# Patient Record
Sex: MALE | Race: WHITE | NOT HISPANIC OR LATINO | Employment: UNEMPLOYED | URBAN - NONMETROPOLITAN AREA
[De-identification: names, ages, dates, MRNs, and addresses within clinical notes are randomized per-mention and may not be internally consistent; named-entity substitution may affect disease eponyms.]

---

## 2017-02-28 ENCOUNTER — TRANSCRIBE ORDERS (OUTPATIENT)
Dept: ADMINISTRATIVE | Facility: HOSPITAL | Age: 32
End: 2017-02-28

## 2017-02-28 DIAGNOSIS — R10.9 ACUTE ABDOMINAL PAIN: Primary | ICD-10-CM

## 2017-03-01 ENCOUNTER — HOSPITAL ENCOUNTER (OUTPATIENT)
Dept: GENERAL RADIOLOGY | Facility: HOSPITAL | Age: 32
Discharge: HOME | End: 2017-03-01

## 2017-03-01 ENCOUNTER — TRANSCRIBE ORDERS (OUTPATIENT)
Dept: ADMINISTRATIVE | Facility: HOSPITAL | Age: 32
End: 2017-03-01

## 2017-03-01 ENCOUNTER — HOSPITAL ENCOUNTER (OUTPATIENT)
Dept: CT IMAGING | Facility: HOSPITAL | Age: 32
Discharge: HOME | End: 2017-03-01
Admitting: NURSE PRACTITIONER

## 2017-03-01 DIAGNOSIS — M25.511 BILATERAL SHOULDER PAIN, UNSPECIFIED CHRONICITY: Primary | ICD-10-CM

## 2017-03-01 DIAGNOSIS — M25.512 BILATERAL SHOULDER PAIN, UNSPECIFIED CHRONICITY: ICD-10-CM

## 2017-03-01 DIAGNOSIS — R10.9 ACUTE ABDOMINAL PAIN: ICD-10-CM

## 2017-03-01 DIAGNOSIS — M25.511 BILATERAL SHOULDER PAIN, UNSPECIFIED CHRONICITY: ICD-10-CM

## 2017-03-01 DIAGNOSIS — M25.512 BILATERAL SHOULDER PAIN, UNSPECIFIED CHRONICITY: Primary | ICD-10-CM

## 2017-03-01 PROCEDURE — 73030 X-RAY EXAM OF SHOULDER: CPT

## 2017-03-01 PROCEDURE — 74176 CT ABD & PELVIS W/O CONTRAST: CPT | Performed by: RADIOLOGY

## 2017-03-01 PROCEDURE — 74176 CT ABD & PELVIS W/O CONTRAST: CPT

## 2017-03-01 PROCEDURE — 73030 X-RAY EXAM OF SHOULDER: CPT | Performed by: RADIOLOGY

## 2017-03-02 ENCOUNTER — OFFICE VISIT (OUTPATIENT)
Dept: UROLOGY | Facility: CLINIC | Age: 32
End: 2017-03-02

## 2017-03-02 VITALS
WEIGHT: 225 LBS | SYSTOLIC BLOOD PRESSURE: 138 MMHG | DIASTOLIC BLOOD PRESSURE: 85 MMHG | BODY MASS INDEX: 31.5 KG/M2 | HEIGHT: 71 IN | HEART RATE: 67 BPM

## 2017-03-02 DIAGNOSIS — R10.9 LEFT FLANK PAIN: ICD-10-CM

## 2017-03-02 DIAGNOSIS — N20.0 KIDNEY STONES: Primary | ICD-10-CM

## 2017-03-02 DIAGNOSIS — R31.29 MICROHEMATURIA: ICD-10-CM

## 2017-03-02 LAB
BILIRUB BLD-MCNC: NEGATIVE MG/DL
CLARITY, POC: CLEAR
COLOR UR: YELLOW
GLUCOSE UR STRIP-MCNC: NEGATIVE MG/DL
KETONES UR QL: NEGATIVE
LEUKOCYTE EST, POC: NEGATIVE
NITRITE UR-MCNC: NEGATIVE MG/ML
PH UR: 6 [PH] (ref 5–8)
PROT UR STRIP-MCNC: NEGATIVE MG/DL
RBC # UR STRIP: ABNORMAL /UL
SP GR UR: 1.03 (ref 1–1.03)
UROBILINOGEN UR QL: NORMAL

## 2017-03-02 PROCEDURE — 81003 URINALYSIS AUTO W/O SCOPE: CPT | Performed by: NURSE PRACTITIONER

## 2017-03-02 PROCEDURE — 99203 OFFICE O/P NEW LOW 30 MIN: CPT | Performed by: NURSE PRACTITIONER

## 2017-03-02 RX ORDER — OXYCODONE HYDROCHLORIDE AND ACETAMINOPHEN 5; 325 MG/1; MG/1
1-2 TABLET ORAL EVERY 6 HOURS PRN
Qty: 40 TABLET | Refills: 0 | Status: ON HOLD
Start: 2017-03-02 | End: 2017-03-08

## 2017-03-02 RX ORDER — TAMSULOSIN HYDROCHLORIDE 0.4 MG/1
1 CAPSULE ORAL DAILY
Qty: 30 CAPSULE | Refills: 0 | Status: SHIPPED | OUTPATIENT
Start: 2017-03-02 | End: 2017-11-22

## 2017-03-02 RX ORDER — ONDANSETRON 4 MG/1
4 TABLET, FILM COATED ORAL EVERY 8 HOURS PRN
Qty: 30 TABLET | Refills: 0 | Status: SHIPPED | OUTPATIENT
Start: 2017-03-02 | End: 2018-03-02

## 2017-03-02 RX ORDER — IBUPROFEN 800 MG/1
800 TABLET ORAL EVERY 6 HOURS PRN
Status: ON HOLD | COMMUNITY
End: 2017-03-08

## 2017-03-02 NOTE — PROGRESS NOTES
Chief Complaint:          Chief Complaint   Patient presents with   • Nephrolithiasis       HPI:   32 y.o. male presenting for complaint of left flank pain secondary to a 5.5 mm proximal left ureteral stone with hydronephrosis diagnosed per CT scan at [facility] ED on 3/2/2017.  He denies any nausea, vomiting, fever or chills.  UA today shows trace amount of blood.    HPI        Past Medical History:        Past Medical History   Diagnosis Date   • Hypertension          Current Meds:     Current Outpatient Prescriptions   Medication Sig Dispense Refill   • ibuprofen (ADVIL,MOTRIN) 800 MG tablet Take 800 mg by mouth Every 6 (Six) Hours As Needed for mild pain (1-3).     • ondansetron (ZOFRAN) 4 MG tablet Take 1 tablet by mouth Every 8 (Eight) Hours As Needed for nausea or vomiting. 30 tablet 0   • tamsulosin (FLOMAX) 0.4 MG capsule 24 hr capsule Take 1 capsule by mouth Daily. 30 capsule 0     No current facility-administered medications for this visit.         Allergies:      No Known Allergies     Past Surgical History:     Past Surgical History   Procedure Laterality Date   • Tonsillectomy           Social History:     Social History     Social History   • Marital status: Single     Spouse name: N/A   • Number of children: N/A   • Years of education: N/A     Occupational History   • Not on file.     Social History Main Topics   • Smoking status: Former Smoker   • Smokeless tobacco: Former User   • Alcohol use No   • Drug use: No   • Sexual activity: Not on file     Other Topics Concern   • Not on file     Social History Narrative   • No narrative on file       Family History:     Family History   Problem Relation Age of Onset   • Hypertension Father        Review of Systems:     Review of Systems   Constitutional: Positive for fatigue. Negative for chills and fever.   Respiratory: Negative for cough, shortness of breath and wheezing.    Cardiovascular: Negative for leg swelling.   Gastrointestinal: Positive for  abdominal pain, nausea and vomiting.   Musculoskeletal: Positive for back pain. Negative for joint swelling.   Neurological: Negative for dizziness and headaches.   Psychiatric/Behavioral: Negative for confusion.       Physical Exam:     Physical Exam   Constitutional: He is oriented to person, place, and time. He appears well-developed and well-nourished. No distress.   HENT:   Head: Normocephalic and atraumatic.   Eyes: Pupils are equal, round, and reactive to light.   Neck: Normal range of motion. Neck supple. No thyromegaly present.   Cardiovascular: Normal rate, regular rhythm, normal heart sounds and intact distal pulses.    No murmur heard.  Pulmonary/Chest: Effort normal and breath sounds normal. No respiratory distress. He has no wheezes. He has no rales. He exhibits no tenderness.   Abdominal: Soft. Bowel sounds are normal. He exhibits no distension and no mass. There is no tenderness. No hernia.   Genitourinary: Prostate normal.   Musculoskeletal: Normal range of motion. He exhibits no edema or tenderness.   Lymphadenopathy:     He has no cervical adenopathy.   Neurological: He is alert and oriented to person, place, and time. No cranial nerve deficit. He exhibits normal muscle tone. Coordination normal.   Skin: Skin is warm and dry. He is not diaphoretic. No erythema. No pallor.   Psychiatric: He has a normal mood and affect. His behavior is normal. Judgment and thought content normal.   Nursing note and vitals reviewed.      Procedure:     No notes on file      Assessment:     Encounter Diagnoses   Name Primary?   • Kidney stones Yes   • Microhematuria    • Left flank pain      Orders Placed This Encounter   Procedures   • XR Abdomen KUB     Standing Status:   Future     Standing Expiration Date:   3/2/2018     Order Specific Question:   Reason for Exam:     Answer:   LEFT URETERAL STONE   • CBC (No Diff)     Standing Status:   Future     Standing Expiration Date:   3/2/2018   • Basic Metabolic Panel      Standing Status:   Future     Standing Expiration Date:   3/2/2018   • Obtain Informed Consent     Order Specific Question:   Informed consent given for:     Answer:   CYSTOSCOPY LEFT URETEROSCOPY HOLMIUM LASER LITHOTRIPSY POSSIBLE STENT PLACEMENT   • POC Urinalysis Dipstick, Automated       Plan:   Reviewed the CT scan completed on 3/1/2017 with the patient revealing bilateral renal stones in a non-obstructing position along with the 5.5 mm left proximal renal stone with hydronephrosis.  Discussed treatment options.  Informed the patient he has a 40% chance of passing the 5.5 mm stone using conservative methods or he may be scheduled for surgery.  He chooses surgery.  Recommend he have a cystoscopy left ureteroscopy holmium laser lithotripsy with possible stent placement for which he is agreeable.  Dr. Serrano is agreeable to the recommendation.    Counseling was given to patient for the following topics diagnostic results, patient and family education, impressions and risks and benefits of treatment options. and the interim medical history and current results were reviewed.  A treatment plan with follow-up was made. Total time of the encounter was 33 minutes and 33 minutes were spent discussing Kidney stones [N20.0] face-to-face.       This document has been electronically signed by DOLORES Allen March 2, 2017 2:15 PM

## 2017-03-03 ENCOUNTER — RESULTS ENCOUNTER (OUTPATIENT)
Dept: UROLOGY | Facility: CLINIC | Age: 32
End: 2017-03-03

## 2017-03-03 DIAGNOSIS — N20.0 KIDNEY STONES: ICD-10-CM

## 2017-03-06 ENCOUNTER — ANESTHESIA EVENT (OUTPATIENT)
Dept: PERIOP | Facility: HOSPITAL | Age: 32
End: 2017-03-06

## 2017-03-06 ENCOUNTER — APPOINTMENT (OUTPATIENT)
Dept: PREADMISSION TESTING | Facility: HOSPITAL | Age: 32
End: 2017-03-06

## 2017-03-06 ENCOUNTER — ANESTHESIA (OUTPATIENT)
Dept: PERIOP | Facility: HOSPITAL | Age: 32
End: 2017-03-06

## 2017-03-06 ENCOUNTER — HOSPITAL ENCOUNTER (OUTPATIENT)
Facility: HOSPITAL | Age: 32
Setting detail: HOSPITAL OUTPATIENT SURGERY
Discharge: HOME | End: 2017-03-06
Attending: UROLOGY | Admitting: UROLOGY

## 2017-03-06 ENCOUNTER — HOSPITAL ENCOUNTER (OUTPATIENT)
Dept: GENERAL RADIOLOGY | Facility: HOSPITAL | Age: 32
Setting detail: HOSPITAL OUTPATIENT SURGERY
Discharge: HOME | End: 2017-03-06

## 2017-03-06 ENCOUNTER — APPOINTMENT (OUTPATIENT)
Dept: GENERAL RADIOLOGY | Facility: HOSPITAL | Age: 32
End: 2017-03-06

## 2017-03-06 VITALS
OXYGEN SATURATION: 98 % | SYSTOLIC BLOOD PRESSURE: 138 MMHG | BODY MASS INDEX: 30.48 KG/M2 | TEMPERATURE: 97.9 F | DIASTOLIC BLOOD PRESSURE: 89 MMHG | HEIGHT: 72 IN | WEIGHT: 225 LBS | RESPIRATION RATE: 18 BRPM | HEART RATE: 76 BPM

## 2017-03-06 DIAGNOSIS — N20.0 KIDNEY STONES: ICD-10-CM

## 2017-03-06 PROCEDURE — 25010000002 ONDANSETRON PER 1 MG: Performed by: NURSE ANESTHETIST, CERTIFIED REGISTERED

## 2017-03-06 PROCEDURE — 74000 FL SURGERY FLUORO: CPT | Performed by: RADIOLOGY

## 2017-03-06 PROCEDURE — 74000 XR ABDOMEN KUB: CPT | Performed by: RADIOLOGY

## 2017-03-06 PROCEDURE — 25010000002 MIDAZOLAM PER 1 MG: Performed by: NURSE ANESTHETIST, CERTIFIED REGISTERED

## 2017-03-06 PROCEDURE — 52353 CYSTOURETERO W/LITHOTRIPSY: CPT | Performed by: UROLOGY

## 2017-03-06 PROCEDURE — 25010000002 PROPOFOL 10 MG/ML EMULSION: Performed by: NURSE ANESTHETIST, CERTIFIED REGISTERED

## 2017-03-06 PROCEDURE — 25010000002 MEPERIDINE PER 100 MG: Performed by: NURSE ANESTHETIST, CERTIFIED REGISTERED

## 2017-03-06 PROCEDURE — 25010000003 CEFAZOLIN PER 500 MG: Performed by: NURSE PRACTITIONER

## 2017-03-06 PROCEDURE — 76000 FLUOROSCOPY <1 HR PHYS/QHP: CPT

## 2017-03-06 PROCEDURE — 25010000002 FENTANYL CITRATE (PF) 100 MCG/2ML SOLUTION: Performed by: NURSE ANESTHETIST, CERTIFIED REGISTERED

## 2017-03-06 PROCEDURE — 74000 HC ABDOMEN KUB: CPT

## 2017-03-06 PROCEDURE — C1769 GUIDE WIRE: HCPCS | Performed by: UROLOGY

## 2017-03-06 PROCEDURE — 82360 CALCULUS ASSAY QUANT: CPT | Performed by: UROLOGY

## 2017-03-06 RX ORDER — MEPERIDINE HYDROCHLORIDE 25 MG/ML
12.5 INJECTION INTRAMUSCULAR; INTRAVENOUS; SUBCUTANEOUS
Status: DISCONTINUED | OUTPATIENT
Start: 2017-03-06 | End: 2017-03-06 | Stop reason: HOSPADM

## 2017-03-06 RX ORDER — MAGNESIUM HYDROXIDE 1200 MG/15ML
LIQUID ORAL AS NEEDED
Status: DISCONTINUED | OUTPATIENT
Start: 2017-03-06 | End: 2017-03-06 | Stop reason: HOSPADM

## 2017-03-06 RX ORDER — MIDAZOLAM HYDROCHLORIDE 1 MG/ML
INJECTION INTRAMUSCULAR; INTRAVENOUS AS NEEDED
Status: DISCONTINUED | OUTPATIENT
Start: 2017-03-06 | End: 2017-03-06 | Stop reason: SURG

## 2017-03-06 RX ORDER — OXYCODONE HYDROCHLORIDE AND ACETAMINOPHEN 5; 325 MG/1; MG/1
TABLET ORAL
Qty: 40 TABLET | Refills: 0 | Status: SHIPPED | OUTPATIENT
Start: 2017-03-06 | End: 2017-11-22 | Stop reason: DRUGHIGH

## 2017-03-06 RX ORDER — FENTANYL CITRATE 50 UG/ML
50 INJECTION, SOLUTION INTRAMUSCULAR; INTRAVENOUS
Status: DISCONTINUED | OUTPATIENT
Start: 2017-03-06 | End: 2017-03-06 | Stop reason: HOSPADM

## 2017-03-06 RX ORDER — ONDANSETRON 2 MG/ML
4 INJECTION INTRAMUSCULAR; INTRAVENOUS ONCE AS NEEDED
Status: DISCONTINUED | OUTPATIENT
Start: 2017-03-06 | End: 2017-03-06 | Stop reason: HOSPADM

## 2017-03-06 RX ORDER — CIPROFLOXACIN 250 MG/1
250 TABLET, FILM COATED ORAL 2 TIMES DAILY
Qty: 20 TABLET | Refills: 0 | Status: SHIPPED | OUTPATIENT
Start: 2017-03-06 | End: 2017-03-10 | Stop reason: HOSPADM

## 2017-03-06 RX ORDER — FENTANYL CITRATE 50 UG/ML
INJECTION, SOLUTION INTRAMUSCULAR; INTRAVENOUS AS NEEDED
Status: DISCONTINUED | OUTPATIENT
Start: 2017-03-06 | End: 2017-03-06 | Stop reason: SURG

## 2017-03-06 RX ORDER — FAMOTIDINE 10 MG/ML
INJECTION, SOLUTION INTRAVENOUS AS NEEDED
Status: DISCONTINUED | OUTPATIENT
Start: 2017-03-06 | End: 2017-03-06 | Stop reason: SURG

## 2017-03-06 RX ORDER — SODIUM CHLORIDE, SODIUM LACTATE, POTASSIUM CHLORIDE, CALCIUM CHLORIDE 600; 310; 30; 20 MG/100ML; MG/100ML; MG/100ML; MG/100ML
125 INJECTION, SOLUTION INTRAVENOUS CONTINUOUS
Status: DISCONTINUED | OUTPATIENT
Start: 2017-03-06 | End: 2017-03-06 | Stop reason: HOSPADM

## 2017-03-06 RX ORDER — IPRATROPIUM BROMIDE AND ALBUTEROL SULFATE 2.5; .5 MG/3ML; MG/3ML
3 SOLUTION RESPIRATORY (INHALATION) ONCE AS NEEDED
Status: DISCONTINUED | OUTPATIENT
Start: 2017-03-06 | End: 2017-03-06 | Stop reason: HOSPADM

## 2017-03-06 RX ORDER — OXYCODONE HYDROCHLORIDE AND ACETAMINOPHEN 5; 325 MG/1; MG/1
1 TABLET ORAL ONCE AS NEEDED
Status: DISCONTINUED | OUTPATIENT
Start: 2017-03-06 | End: 2017-03-06 | Stop reason: HOSPADM

## 2017-03-06 RX ORDER — ONDANSETRON 2 MG/ML
INJECTION INTRAMUSCULAR; INTRAVENOUS AS NEEDED
Status: DISCONTINUED | OUTPATIENT
Start: 2017-03-06 | End: 2017-03-06 | Stop reason: SURG

## 2017-03-06 RX ORDER — PROPOFOL 10 MG/ML
VIAL (ML) INTRAVENOUS AS NEEDED
Status: DISCONTINUED | OUTPATIENT
Start: 2017-03-06 | End: 2017-03-06 | Stop reason: SURG

## 2017-03-06 RX ORDER — SODIUM CHLORIDE 0.9 % (FLUSH) 0.9 %
1-10 SYRINGE (ML) INJECTION AS NEEDED
Status: DISCONTINUED | OUTPATIENT
Start: 2017-03-06 | End: 2017-03-06 | Stop reason: HOSPADM

## 2017-03-06 RX ADMIN — FENTANYL CITRATE 100 MCG: 50 INJECTION INTRAMUSCULAR; INTRAVENOUS at 09:32

## 2017-03-06 RX ADMIN — FENTANYL CITRATE 50 MCG: 50 INJECTION, SOLUTION INTRAMUSCULAR; INTRAVENOUS at 10:30

## 2017-03-06 RX ADMIN — MEPERIDINE HYDROCHLORIDE 12.5 MG: 25 INJECTION, SOLUTION INTRAMUSCULAR; INTRAVENOUS; SUBCUTANEOUS at 10:36

## 2017-03-06 RX ADMIN — CEFAZOLIN SODIUM 2 G: 2 SOLUTION INTRAVENOUS at 09:32

## 2017-03-06 RX ADMIN — FENTANYL CITRATE 50 MCG: 50 INJECTION, SOLUTION INTRAMUSCULAR; INTRAVENOUS at 10:40

## 2017-03-06 RX ADMIN — OXYCODONE HYDROCHLORIDE AND ACETAMINOPHEN 1 TABLET: 5; 325 TABLET ORAL at 11:21

## 2017-03-06 RX ADMIN — PROPOFOL 150 MG: 10 INJECTION, EMULSION INTRAVENOUS at 09:36

## 2017-03-06 RX ADMIN — ONDANSETRON 4 MG: 2 INJECTION, SOLUTION INTRAMUSCULAR; INTRAVENOUS at 09:32

## 2017-03-06 RX ADMIN — FAMOTIDINE 20 MG: 10 INJECTION, SOLUTION INTRAVENOUS at 09:32

## 2017-03-06 RX ADMIN — FENTANYL CITRATE 50 MCG: 50 INJECTION, SOLUTION INTRAMUSCULAR; INTRAVENOUS at 10:45

## 2017-03-06 RX ADMIN — MIDAZOLAM HYDROCHLORIDE 2 MG: 1 INJECTION, SOLUTION INTRAMUSCULAR; INTRAVENOUS at 09:32

## 2017-03-06 RX ADMIN — FENTANYL CITRATE 50 MCG: 50 INJECTION, SOLUTION INTRAMUSCULAR; INTRAVENOUS at 10:56

## 2017-03-06 RX ADMIN — SODIUM CHLORIDE, POTASSIUM CHLORIDE, SODIUM LACTATE AND CALCIUM CHLORIDE: 600; 310; 30; 20 INJECTION, SOLUTION INTRAVENOUS at 09:32

## 2017-03-06 NOTE — ANESTHESIA PROCEDURE NOTES
Airway  Urgency: elective      General Information and Staff    Patient location during procedure: OR  Anesthesiologist: KAVON VERGARA  CRNA: RICARDA PINO    Indications and Patient Condition    Preoxygenated: yes  MILS not maintained throughout  Mask difficulty assessment: 0 - not attempted    Final Airway Details  Final airway type: supraglottic airway      Successful airway: classic  Size 4    Number of attempts at approach: 1    Additional Comments  Atraumatic LMA placement, dentition unchanged.

## 2017-03-06 NOTE — BRIEF OP NOTE
CYSTOSCOPY URETEROSCOPY LASER LITHOTRIPSY  Procedure Note    Riteshdavian Azevedo  3/6/2017    Pre-op Diagnosis:   Kidney stones [N20.0]    Post-op Diagnosis:     Stone broken    Procedure/CPT® Codes:      Procedure(s):  CYSTOSCOPY LEFT URETEROSCOPY HOLMIUM LASER LITHOTRIPSY.  Op note job id [id number] return one month to discuss stone analysis.    Surgeon(s):  Arnulfo Serrano MD    Anesthesia: General    Staff:   Circulator: Meghna Link RN  Scrub Person: Leah Montgomery  Other: Leah Montgomery    Estimated Blood Loss: * No values recorded between 3/6/2017  9:34 AM and 3/6/2017 10:13 AM *  Urine Voided: * No values recorded between 3/6/2017  9:34 AM and 3/6/2017 10:13 AM *    Specimens:                  ID Type Source Tests Collected by Time Destination   1 : left kidney stone [facility] Kidney, Left STONE ANALYSIS Arnulfo Serrano MD 3/6/2017 1006          Drains:           Findings: proximal ureteral stone broken    Complications: none      Arnulfo Serrano MD     Date: 3/6/2017  Time: 10:19 AM

## 2017-03-06 NOTE — PLAN OF CARE
Problem: Perioperative Period (Adult)  Goal: Signs and Symptoms of Listed Potential Problems Will be Absent or Manageable (Perioperative Period)  Outcome: Ongoing (interventions implemented as appropriate)    03/06/17 0944   Perioperative Period   Problems Assessed (Perioperative Period) pain   Problems Present (Perioperative Period) pain

## 2017-03-06 NOTE — H&P
Chief Complaint:              Chief Complaint   Patient presents with   • Nephrolithiasis         HPI:   32 y.o. male presenting for complaint of left flank pain secondary to a 5.5 mm proximal left ureteral stone with hydronephrosis diagnosed per CT scan at [facility] ED on 3/2/2017. He denies any nausea, vomiting, fever or chills. UA today shows trace amount of blood.     HPI           Past Medical History:           Medical History         Past Medical History   Diagnosis Date   • Hypertension                 Current Meds:       Current Medications           Current Outpatient Prescriptions   Medication Sig Dispense Refill   • ibuprofen (ADVIL,MOTRIN) 800 MG tablet Take 800 mg by mouth Every 6 (Six) Hours As Needed for mild pain (1-3).       • ondansetron (ZOFRAN) 4 MG tablet Take 1 tablet by mouth Every 8 (Eight) Hours As Needed for nausea or vomiting. 30 tablet 0   • tamsulosin (FLOMAX) 0.4 MG capsule 24 hr capsule Take 1 capsule by mouth Daily. 30 capsule 0      No current facility-administered medications for this visit.              Allergies:       No Known Allergies      Past Surgical History:       Surgical History          Past Surgical History   Procedure Laterality Date   • Tonsillectomy                   Social History:       Social History    Social History            Social History   • Marital status: Single       Spouse name: N/A   • Number of children: N/A   • Years of education: N/A          Occupational History   • Not on file.           Social History Main Topics   • Smoking status: Former Smoker   • Smokeless tobacco: Former User   • Alcohol use No   • Drug use: No   • Sexual activity: Not on file           Other Topics Concern   • Not on file          Social History Narrative   • No narrative on file            Family History:            Family History   Problem Relation Age of Onset   • Hypertension Father           Review of Systems:      Review of Systems   Constitutional: Positive for  fatigue. Negative for chills and fever.   Respiratory: Negative for cough, shortness of breath and wheezing.   Cardiovascular: Negative for leg swelling.   Gastrointestinal: Positive for abdominal pain, nausea and vomiting.   Musculoskeletal: Positive for back pain. Negative for joint swelling.   Neurological: Negative for dizziness and headaches.   Psychiatric/Behavioral: Negative for confusion.         Physical Exam:      Physical Exam   Constitutional: He is oriented to person, place, and time. He appears well-developed and well-nourished. No distress.   HENT:   Head: Normocephalic and atraumatic.   Eyes: Pupils are equal, round, and reactive to light.   Neck: Normal range of motion. Neck supple. No thyromegaly present.   Cardiovascular: Normal rate, regular rhythm, normal heart sounds and intact distal pulses.   No murmur heard.  Pulmonary/Chest: Effort normal and breath sounds normal. No respiratory distress. He has no wheezes. He has no rales. He exhibits no tenderness.   Abdominal: Soft. Bowel sounds are normal. He exhibits no distension and no mass. There is no tenderness. No hernia.   Genitourinary: Prostate normal.   Musculoskeletal: Normal range of motion. He exhibits no edema or tenderness.   Lymphadenopathy:   He has no cervical adenopathy.   Neurological: He is alert and oriented to person, place, and time. No cranial nerve deficit. He exhibits normal muscle tone. Coordination normal.   Skin: Skin is warm and dry. He is not diaphoretic. No erythema. No pallor.   Psychiatric: He has a normal mood and affect. His behavior is normal. Judgment and thought content normal.   Nursing note and vitals reviewed.        Procedure:      No notes on file        Assessment:           Encounter Diagnoses   Name Primary?   • Kidney stones Yes   • Microhematuria     • Left flank pain              Orders Placed This Encounter   Procedures   • XR Abdomen KUB       Standing Status:  Future       Standing Expiration Date:   3/2/2018       Order Specific Question:  Reason for Exam:       Answer:  LEFT URETERAL STONE   • CBC (No Diff)       Standing Status:  Future       Standing Expiration Date:  3/2/2018   • Basic Metabolic Panel       Standing Status:  Future       Standing Expiration Date:  3/2/2018   • Obtain Informed Consent       Order Specific Question:  Informed consent given for:       Answer:  CYSTOSCOPY LEFT URETEROSCOPY HOLMIUM LASER LITHOTRIPSY POSSIBLE STENT PLACEMENT   • POC Urinalysis Dipstick, Automated         Plan:   Reviewed the CT scan completed on 3/1/2017 with the patient revealing bilateral renal stones in a non-obstructing position along with the 5.5 mm left proximal renal stone with hydronephrosis. Discussed treatment options. Informed the patient he has a 40% chance of passing the 5.5 mm stone using conservative methods or he may be scheduled for surgery. He chooses surgery. Recommend he have a cystoscopy left ureteroscopy holmium laser lithotripsy with possible stent placement for which he is agreeable. Counseling was given to patient for the following topics diagnostic results, patient and family education, impressions and risks and benefits of treatment options. and the interim medical history and current results were reviewed. A treatment plan with follow-up was made. Total time of the encounter was 33 minutes and 33 minutes were spent discussing Kidney stones [N20.0] face-to-face.

## 2017-03-06 NOTE — SIGNIFICANT NOTE
Attempted to void without success.  baldder scan completed,175cc noted.  Encouraged to drink fluids, iv fluids continue.

## 2017-03-06 NOTE — OP NOTE
DATE OF SURGERY: 03/06/2017     PREOPERATIVE DIAGNOSIS: Proximal ureteral calculus.     POSTOPERATIVE DIAGNOSES: Proximal ureteral calculus, broken in multiple pieces and the larger pieces extracted for chemical analysis.     PROCEDURES PERFORMED:  1.  Left ureteroscopy.   2.  Holmium laser lithotripsy.     SURGERY:  Arnulfo Serrano MD    INDICATIONS: Please see patient's history and physical.     DESCRIPTION OF PROCEDURE:  The patient  was taken to operating room, underwent a general anesthetic, sterilely prepped and draped in the usual fashion.  The patients urethral meatus was a little bit tight and would not allow a 21-Palauan Rodney cystoscope to be passed and so Nila Sounds were used to dilate the meatus and then cystoscopy was performed. There were no other strictures or narrowings. The patient's prostatic urethra was normal for age. There were no bladder stones or tumors seen. The findings ureteroscopically, the patient had a yellow stone that was about 5 mm in size in the proximal ureter. This was treated with 0.8 joules and 8 Hz with a 365 holmium laser fiber.  The largest fragment was extracted with a Nitinol stone basket and the patient was left without a double-J stent. After the above findings were noted, the technical part of the procedure, a Glidewire was passed up the left ureter fluoroscopically, into the kidney and used as a safety wire and then a long semi rigid [place] ureteroscope was passed over an Amplatz interventional guidewire, until the stone was identified and then the stiff wire was removed and 365 holmium laser set at 0.8 joules and 8 Hz was used to break the stone in multiple fragments. Then the basket was used to extract the largest fragment, and the patient had his bladder emptied at the end of the case and was taken to the recovery room in satisfactory condition.             D: BONILLA 03/06/2017 10:18:05 ET  T: fm / 03/06/2017 11:01:24 ET  Revision Count: 1  Job ID:  [id number]  [id number]  cc:        Caroline Signature:___________________________     Arnulfo Serrano M.D.

## 2017-03-06 NOTE — ANESTHESIA PREPROCEDURE EVALUATION
Anesthesia Evaluation     Patient summary reviewed and Nursing notes reviewed   no history of anesthetic complications:     Airway   Mallampati: I  TM distance: <3 FB  Neck ROM: full  no difficulty expected  Dental - normal exam     Pulmonary - negative pulmonary ROS and normal exam   Cardiovascular - normal exam  Exercise tolerance: good (4-7 METS)    NYHA Classification: II    (+) hypertension,       Neuro/Psych- negative ROS  GI/Hepatic/Renal/Endo    (+)  chronic renal disease,     Musculoskeletal (-) negative ROS    Abdominal  - normal exam    Bowel sounds: normal.   Substance History - negative use     OB/GYN negative ob/gyn ROS         Other - negative ROS                                   Anesthesia Plan    ASA 2     general     intravenous induction   Anesthetic plan and risks discussed with patient.  Use of blood products discussed with patient .   Plan discussed with CRNA.

## 2017-03-06 NOTE — PLAN OF CARE
Problem: Patient Care Overview (Adult)  Goal: Plan of Care Review  Outcome: Ongoing (interventions implemented as appropriate)    03/06/17 0923   Coping/Psychosocial Response Interventions   Plan Of Care Reviewed With guardian   Patient Care Overview   Progress progress toward functional goals as expected

## 2017-03-06 NOTE — ANESTHESIA POSTPROCEDURE EVALUATION
Patient: Ritesh Azevedo    Procedure Summary     Date Anesthesia Start Anesthesia Stop Room / Location    03/06/17 0932 [facility]3 [facility] OR [facility] / [facility]       Procedure Diagnosis Surgeon Provider    CYSTOSCOPY LEFT URETEROSCOPY HOLMIUM LASER LITHOTRIPSY POSSIBLE STENT PLACEMENT (Left ) Kidney stones  (Kidney stones [N20.0]) MD Juan Banda MD          Anesthesia Type: general  Last vitals  /84 (03/06/17 1108)    Temp 97.8 °F (36.6 °C) (03/06/17 1108)    Pulse 72 (03/06/17 1108)   Resp 22 (03/06/17 1108)    SpO2 98 % (03/06/17 1108)      Post Anesthesia Care and Evaluation    Patient location during evaluation: PHASE II  Patient participation: complete - patient participated  Level of consciousness: awake and alert  Pain score: 1  Pain management: adequate  Airway patency: patent  Anesthetic complications: No anesthetic complications  PONV Status: controlled  Cardiovascular status: acceptable  Respiratory status: acceptable  Hydration status: acceptable

## 2017-03-06 NOTE — H&P (VIEW-ONLY)
Chief Complaint:              Chief Complaint   Patient presents with   • Nephrolithiasis         HPI:   32 y.o. male presenting for complaint of left flank pain secondary to a 5.5 mm proximal left ureteral stone with hydronephrosis diagnosed per CT scan at [facility] ED on 3/2/2017. He denies any nausea, vomiting, fever or chills. UA today shows trace amount of blood.     HPI           Past Medical History:           Medical History         Past Medical History   Diagnosis Date   • Hypertension                 Current Meds:       Current Medications           Current Outpatient Prescriptions   Medication Sig Dispense Refill   • ibuprofen (ADVIL,MOTRIN) 800 MG tablet Take 800 mg by mouth Every 6 (Six) Hours As Needed for mild pain (1-3).       • ondansetron (ZOFRAN) 4 MG tablet Take 1 tablet by mouth Every 8 (Eight) Hours As Needed for nausea or vomiting. 30 tablet 0   • tamsulosin (FLOMAX) 0.4 MG capsule 24 hr capsule Take 1 capsule by mouth Daily. 30 capsule 0      No current facility-administered medications for this visit.              Allergies:       No Known Allergies      Past Surgical History:       Surgical History          Past Surgical History   Procedure Laterality Date   • Tonsillectomy                   Social History:       Social History    Social History            Social History   • Marital status: Single       Spouse name: N/A   • Number of children: N/A   • Years of education: N/A          Occupational History   • Not on file.           Social History Main Topics   • Smoking status: Former Smoker   • Smokeless tobacco: Former User   • Alcohol use No   • Drug use: No   • Sexual activity: Not on file           Other Topics Concern   • Not on file          Social History Narrative   • No narrative on file            Family History:            Family History   Problem Relation Age of Onset   • Hypertension Father           Review of Systems:      Review of Systems   Constitutional: Positive for  fatigue. Negative for chills and fever.   Respiratory: Negative for cough, shortness of breath and wheezing.   Cardiovascular: Negative for leg swelling.   Gastrointestinal: Positive for abdominal pain, nausea and vomiting.   Musculoskeletal: Positive for back pain. Negative for joint swelling.   Neurological: Negative for dizziness and headaches.   Psychiatric/Behavioral: Negative for confusion.         Physical Exam:      Physical Exam   Constitutional: He is oriented to person, place, and time. He appears well-developed and well-nourished. No distress.   HENT:   Head: Normocephalic and atraumatic.   Eyes: Pupils are equal, round, and reactive to light.   Neck: Normal range of motion. Neck supple. No thyromegaly present.   Cardiovascular: Normal rate, regular rhythm, normal heart sounds and intact distal pulses.   No murmur heard.  Pulmonary/Chest: Effort normal and breath sounds normal. No respiratory distress. He has no wheezes. He has no rales. He exhibits no tenderness.   Abdominal: Soft. Bowel sounds are normal. He exhibits no distension and no mass. There is no tenderness. No hernia.   Genitourinary: Prostate normal.   Musculoskeletal: Normal range of motion. He exhibits no edema or tenderness.   Lymphadenopathy:   He has no cervical adenopathy.   Neurological: He is alert and oriented to person, place, and time. No cranial nerve deficit. He exhibits normal muscle tone. Coordination normal.   Skin: Skin is warm and dry. He is not diaphoretic. No erythema. No pallor.   Psychiatric: He has a normal mood and affect. His behavior is normal. Judgment and thought content normal.   Nursing note and vitals reviewed.        Procedure:      No notes on file        Assessment:           Encounter Diagnoses   Name Primary?   • Kidney stones Yes   • Microhematuria     • Left flank pain              Orders Placed This Encounter   Procedures   • XR Abdomen KUB       Standing Status:  Future       Standing Expiration Date:   3/2/2018       Order Specific Question:  Reason for Exam:       Answer:  LEFT URETERAL STONE   • CBC (No Diff)       Standing Status:  Future       Standing Expiration Date:  3/2/2018   • Basic Metabolic Panel       Standing Status:  Future       Standing Expiration Date:  3/2/2018   • Obtain Informed Consent       Order Specific Question:  Informed consent given for:       Answer:  CYSTOSCOPY LEFT URETEROSCOPY HOLMIUM LASER LITHOTRIPSY POSSIBLE STENT PLACEMENT   • POC Urinalysis Dipstick, Automated         Plan:   Reviewed the CT scan completed on 3/1/2017 with the patient revealing bilateral renal stones in a non-obstructing position along with the 5.5 mm left proximal renal stone with hydronephrosis. Discussed treatment options. Informed the patient he has a 40% chance of passing the 5.5 mm stone using conservative methods or he may be scheduled for surgery. He chooses surgery. Recommend he have a cystoscopy left ureteroscopy holmium laser lithotripsy with possible stent placement for which he is agreeable. Counseling was given to patient for the following topics diagnostic results, patient and family education, impressions and risks and benefits of treatment options. and the interim medical history and current results were reviewed. A treatment plan with follow-up was made. Total time of the encounter was 33 minutes and 33 minutes were spent discussing Kidney stones [N20.0] face-to-face.

## 2017-03-06 NOTE — PLAN OF CARE
Problem: Patient Care Overview (Adult)  Goal: Discharge Needs Assessment  Outcome: Ongoing (interventions implemented as appropriate)    03/06/17 0954   Discharge Needs Assessment   Concerns To Be Addressed no discharge needs identified   Readmission Within The Last 30 Days no previous admission in last 30 days   Equipment Needed After Discharge none   Discharge Disposition home or self-care   Current Health   Anticipated Changes Related to Illness none   Self-Care   Equipment Currently Used at Home none   Living Environment   Transportation Available car

## 2017-03-08 ENCOUNTER — OFFICE VISIT (OUTPATIENT)
Dept: UROLOGY | Facility: CLINIC | Age: 32
End: 2017-03-08

## 2017-03-08 ENCOUNTER — APPOINTMENT (OUTPATIENT)
Dept: CT IMAGING | Facility: HOSPITAL | Age: 32
End: 2017-03-08

## 2017-03-08 ENCOUNTER — HOSPITAL ENCOUNTER (INPATIENT)
Facility: HOSPITAL | Age: 32
LOS: 2 days | Discharge: HOME | End: 2017-03-10
Attending: UROLOGY | Admitting: UROLOGY

## 2017-03-08 DIAGNOSIS — N39.0 SEPSIS DUE TO URINARY TRACT INFECTION (HCC): Primary | ICD-10-CM

## 2017-03-08 DIAGNOSIS — A41.9 SEPSIS, DUE TO UNSPECIFIED ORGANISM: Primary | ICD-10-CM

## 2017-03-08 DIAGNOSIS — A41.9 SEPSIS DUE TO URINARY TRACT INFECTION (HCC): Primary | ICD-10-CM

## 2017-03-08 DIAGNOSIS — N39.0 URINARY TRACT INFECTION, SITE UNSPECIFIED: ICD-10-CM

## 2017-03-08 DIAGNOSIS — R33.9 INCOMPLETE BLADDER EMPTYING: ICD-10-CM

## 2017-03-08 DIAGNOSIS — N20.1 URETERAL STONE: ICD-10-CM

## 2017-03-08 LAB
ALBUMIN SERPL-MCNC: 4.4 G/DL (ref 3.5–5)
ALBUMIN/GLOB SERPL: 1.6 G/DL (ref 1.5–2.5)
ALP SERPL-CCNC: 101 U/L (ref 40–129)
ALT SERPL W P-5'-P-CCNC: 61 U/L (ref 10–44)
ANION GAP SERPL CALCULATED.3IONS-SCNC: 7.8 MMOL/L (ref 3.6–11.2)
AST SERPL-CCNC: 29 U/L (ref 10–34)
BASOPHILS # BLD AUTO: 0.03 10*3/MM3 (ref 0–0.3)
BASOPHILS NFR BLD AUTO: 0.2 % (ref 0–2)
BILIRUB SERPL-MCNC: 0.7 MG/DL (ref 0.2–1.8)
BUN BLD-MCNC: 19 MG/DL (ref 7–21)
BUN/CREAT SERPL: 12.3 (ref 7–25)
CALCIUM SPEC-SCNC: 9.9 MG/DL (ref 7.7–10)
CHLORIDE SERPL-SCNC: 100 MMOL/L (ref 99–112)
CO2 SERPL-SCNC: 28.2 MMOL/L (ref 24.3–31.9)
CREAT BLD-MCNC: 1.54 MG/DL (ref 0.43–1.29)
DEPRECATED RDW RBC AUTO: 43.5 FL (ref 37–54)
EOSINOPHIL # BLD AUTO: 0.08 10*3/MM3 (ref 0–0.7)
EOSINOPHIL NFR BLD AUTO: 0.6 % (ref 0–5)
ERYTHROCYTE [DISTWIDTH] IN BLOOD BY AUTOMATED COUNT: 13.2 % (ref 11.5–14.5)
GFR SERPL CREATININE-BSD FRML MDRD: 53 ML/MIN/1.73
GLOBULIN UR ELPH-MCNC: 2.8 GM/DL
GLUCOSE BLD-MCNC: 94 MG/DL (ref 70–110)
HCT VFR BLD AUTO: 40.4 % (ref 42–52)
HGB BLD-MCNC: 13.2 G/DL (ref 14–18)
IMM GRANULOCYTES # BLD: 0.07 10*3/MM3 (ref 0–0.03)
IMM GRANULOCYTES NFR BLD: 0.5 % (ref 0–0.5)
LYMPHOCYTES # BLD AUTO: 1.94 10*3/MM3 (ref 1–3)
LYMPHOCYTES NFR BLD AUTO: 13.7 % (ref 21–51)
MCH RBC QN AUTO: 30.3 PG (ref 27–33)
MCHC RBC AUTO-ENTMCNC: 32.7 G/DL (ref 33–37)
MCV RBC AUTO: 92.9 FL (ref 80–94)
MONOCYTES # BLD AUTO: 1.03 10*3/MM3 (ref 0.1–0.9)
MONOCYTES NFR BLD AUTO: 7.3 % (ref 0–10)
NEUTROPHILS # BLD AUTO: 10.98 10*3/MM3 (ref 1.4–6.5)
NEUTROPHILS NFR BLD AUTO: 77.7 % (ref 30–70)
OSMOLALITY SERPL CALC.SUM OF ELEC: 274 MOSM/KG (ref 273–305)
PLATELET # BLD AUTO: 233 10*3/MM3 (ref 130–400)
PMV BLD AUTO: 10.9 FL (ref 6–10)
POTASSIUM BLD-SCNC: 3.9 MMOL/L (ref 3.5–5.3)
PROT SERPL-MCNC: 7.2 G/DL (ref 6–8)
RBC # BLD AUTO: 4.35 10*6/MM3 (ref 4.7–6.1)
SODIUM BLD-SCNC: 136 MMOL/L (ref 135–153)
WBC NRBC COR # BLD: 14.13 10*3/MM3 (ref 4.5–12.5)

## 2017-03-08 PROCEDURE — 74176 CT ABD & PELVIS W/O CONTRAST: CPT | Performed by: RADIOLOGY

## 2017-03-08 PROCEDURE — 74176 CT ABD & PELVIS W/O CONTRAST: CPT

## 2017-03-08 PROCEDURE — 25010000002 MORPHINE PER 10 MG: Performed by: UROLOGY

## 2017-03-08 PROCEDURE — 51798 US URINE CAPACITY MEASURE: CPT | Performed by: UROLOGY

## 2017-03-08 PROCEDURE — 80053 COMPREHEN METABOLIC PANEL: CPT | Performed by: UROLOGY

## 2017-03-08 PROCEDURE — 85025 COMPLETE CBC W/AUTO DIFF WBC: CPT | Performed by: UROLOGY

## 2017-03-08 PROCEDURE — 25010000002 HEPARIN (PORCINE) PER 1000 UNITS: Performed by: UROLOGY

## 2017-03-08 PROCEDURE — 99221 1ST HOSP IP/OBS SF/LOW 40: CPT | Performed by: UROLOGY

## 2017-03-08 PROCEDURE — 87086 URINE CULTURE/COLONY COUNT: CPT | Performed by: UROLOGY

## 2017-03-08 PROCEDURE — 25010000002 PROMETHAZINE PER 50 MG: Performed by: UROLOGY

## 2017-03-08 RX ORDER — ONDANSETRON 4 MG/1
4 TABLET, FILM COATED ORAL EVERY 8 HOURS PRN
Status: CANCELLED | OUTPATIENT
Start: 2017-03-08 | End: 2018-03-02

## 2017-03-08 RX ORDER — DEXTROSE AND SODIUM CHLORIDE 5; .45 G/100ML; G/100ML
125 INJECTION, SOLUTION INTRAVENOUS CONTINUOUS
Status: DISCONTINUED | OUTPATIENT
Start: 2017-03-08 | End: 2017-03-10 | Stop reason: HOSPADM

## 2017-03-08 RX ORDER — OXYCODONE HYDROCHLORIDE AND ACETAMINOPHEN 5; 325 MG/1; MG/1
1 TABLET ORAL EVERY 6 HOURS PRN
Status: DISCONTINUED | OUTPATIENT
Start: 2017-03-08 | End: 2017-03-10

## 2017-03-08 RX ORDER — TAMSULOSIN HYDROCHLORIDE 0.4 MG/1
0.4 CAPSULE ORAL NIGHTLY
Status: CANCELLED | OUTPATIENT
Start: 2017-03-08

## 2017-03-08 RX ORDER — HEPARIN SODIUM 5000 [USP'U]/ML
5000 INJECTION, SOLUTION INTRAVENOUS; SUBCUTANEOUS EVERY 12 HOURS SCHEDULED
Status: DISCONTINUED | OUTPATIENT
Start: 2017-03-08 | End: 2017-03-10 | Stop reason: HOSPADM

## 2017-03-08 RX ORDER — CALCIUM CARBONATE 200(500)MG
2 TABLET,CHEWABLE ORAL 2 TIMES DAILY PRN
Status: DISCONTINUED | OUTPATIENT
Start: 2017-03-08 | End: 2017-03-10 | Stop reason: HOSPADM

## 2017-03-08 RX ORDER — OXYCODONE HYDROCHLORIDE AND ACETAMINOPHEN 5; 325 MG/1; MG/1
1-2 TABLET ORAL EVERY 6 HOURS PRN
Status: CANCELLED | OUTPATIENT
Start: 2017-03-08

## 2017-03-08 RX ORDER — LEVOFLOXACIN 250 MG/1
250 TABLET ORAL EVERY 24 HOURS
Status: DISCONTINUED | OUTPATIENT
Start: 2017-03-08 | End: 2017-03-09 | Stop reason: SDUPTHER

## 2017-03-08 RX ORDER — ACETAMINOPHEN 325 MG/1
650 TABLET ORAL EVERY 4 HOURS PRN
Status: DISCONTINUED | OUTPATIENT
Start: 2017-03-08 | End: 2017-03-10 | Stop reason: HOSPADM

## 2017-03-08 RX ORDER — LEVOFLOXACIN 250 MG/1
250 TABLET ORAL DAILY
Status: CANCELLED | OUTPATIENT
Start: 2017-03-08 | End: 2017-03-16

## 2017-03-08 RX ORDER — SENNA AND DOCUSATE SODIUM 50; 8.6 MG/1; MG/1
2 TABLET, FILM COATED ORAL NIGHTLY
Status: DISCONTINUED | OUTPATIENT
Start: 2017-03-08 | End: 2017-03-10 | Stop reason: HOSPADM

## 2017-03-08 RX ORDER — TAMSULOSIN HYDROCHLORIDE 0.4 MG/1
0.4 CAPSULE ORAL NIGHTLY
Status: DISCONTINUED | OUTPATIENT
Start: 2017-03-08 | End: 2017-03-10 | Stop reason: HOSPADM

## 2017-03-08 RX ORDER — SODIUM CHLORIDE 0.9 % (FLUSH) 0.9 %
1-10 SYRINGE (ML) INJECTION AS NEEDED
Status: DISCONTINUED | OUTPATIENT
Start: 2017-03-08 | End: 2017-03-10 | Stop reason: HOSPADM

## 2017-03-08 RX ORDER — ONDANSETRON 4 MG/1
4 TABLET, FILM COATED ORAL EVERY 8 HOURS PRN
Status: DISCONTINUED | OUTPATIENT
Start: 2017-03-08 | End: 2017-03-10 | Stop reason: HOSPADM

## 2017-03-08 RX ORDER — NALOXONE HCL 0.4 MG/ML
0.1 VIAL (ML) INJECTION
Status: DISCONTINUED | OUTPATIENT
Start: 2017-03-08 | End: 2017-03-10 | Stop reason: HOSPADM

## 2017-03-08 RX ORDER — MORPHINE SULFATE/0.9% NACL/PF 1 MG/ML
SYRINGE (ML) INJECTION CONTINUOUS
Status: DISCONTINUED | OUTPATIENT
Start: 2017-03-08 | End: 2017-03-10 | Stop reason: HOSPADM

## 2017-03-08 RX ADMIN — DEXTROSE AND SODIUM CHLORIDE 125 ML/HR: 5; 450 INJECTION, SOLUTION INTRAVENOUS at 16:57

## 2017-03-08 RX ADMIN — PROMETHAZINE HYDROCHLORIDE 12.5 MG: 25 INJECTION, SOLUTION INTRAMUSCULAR; INTRAVENOUS at 17:02

## 2017-03-08 RX ADMIN — HEPARIN SODIUM 5000 UNITS: 5000 INJECTION, SOLUTION INTRAVENOUS; SUBCUTANEOUS at 20:17

## 2017-03-08 RX ADMIN — OXYCODONE HYDROCHLORIDE AND ACETAMINOPHEN 1 TABLET: 5; 325 TABLET ORAL at 19:17

## 2017-03-08 RX ADMIN — DOCUSATE SODIUM AND SENNA 2 TABLET: 50; 8.6 TABLET, FILM COATED ORAL at 20:17

## 2017-03-08 RX ADMIN — ACETAMINOPHEN 650 MG: 325 TABLET ORAL at 20:52

## 2017-03-08 RX ADMIN — AMPICILLIN SODIUM AND SULBACTAM SODIUM 3 G: 2; 1 INJECTION, POWDER, FOR SOLUTION INTRAMUSCULAR; INTRAVENOUS at 18:35

## 2017-03-08 RX ADMIN — LEVOFLOXACIN 250 MG: 250 TABLET, FILM COATED ORAL at 18:35

## 2017-03-08 RX ADMIN — TAMSULOSIN HYDROCHLORIDE 0.4 MG: 0.4 CAPSULE ORAL at 20:17

## 2017-03-08 RX ADMIN — Medication: at 16:52

## 2017-03-08 NOTE — PROGRESS NOTES
Chief Complaint:          Chief Complaint   Patient presents with   • Follow-up     Having left flank pain sinch surgery Monday and retention       HPI:   32 y.o. male.  Pt had ureteroscopy and holmium laser lithotripsy 2 days ago without stent.  He has done well on and off until last night when he had fever chills and sweats.  He stated that his fever went up to 104 last night.  His temp this afternoon was 99.  He was unable to void last night and presented to the office with a bladder full which was scanned for 483 cc by ultrasound and catheter placement.  HPI        Past Medical History:        Past Medical History   Diagnosis Date   • Arthritis      spine   • Hypertension       no medicine   • Kidney stone          Current Meds:     Current Outpatient Prescriptions   Medication Sig Dispense Refill   • ciprofloxacin (CIPRO) 250 MG tablet Take 1 tablet by mouth 2 (Two) Times a Day. 20 tablet 0   • ibuprofen (ADVIL,MOTRIN) 800 MG tablet Take 800 mg by mouth Every 6 (Six) Hours As Needed for mild pain (1-3).     • ondansetron (ZOFRAN) 4 MG tablet Take 1 tablet by mouth Every 8 (Eight) Hours As Needed for nausea or vomiting. 30 tablet 0   • oxyCODONE-acetaminophen (PERCOCET) 5-325 MG per tablet Take 1-2 tablets by mouth Every 6 (Six) Hours As Needed for moderate pain (4-6) or severe pain (7-10). 40 tablet 0   • oxyCODONE-acetaminophen (PERCOCET) 5-325 MG per tablet 1 to 2 Tablets Every 6 Hours as needed for PAIN 40 tablet 0   • tamsulosin (FLOMAX) 0.4 MG capsule 24 hr capsule Take 1 capsule by mouth Daily. 30 capsule 0     No current facility-administered medications for this visit.         Allergies:      No Known Allergies     Past Surgical History:     Past Surgical History   Procedure Laterality Date   • Tonsillectomy     • Tonsillectomy     • Cystoscopy ureteroscopy laser lithotripsy Left 3/6/2017     Procedure: CYSTOSCOPY LEFT URETEROSCOPY HOLMIUM LASER LITHOTRIPSY POSSIBLE STENT PLACEMENT;  Surgeon: Arnulfo  Valeriy Serrano MD;  Location: [facility] OR;  Service:          Social History:     Social History     Social History   • Marital status: Single     Spouse name: N/A   • Number of children: N/A   • Years of education: N/A     Occupational History   • Not on file.     Social History Main Topics   • Smoking status: Former Smoker     Packs/day: 0.50     Years: 3.00     Types: Cigarettes     Quit date: 4/6/2016   • Smokeless tobacco: Former User      Comment: quit  04/2016   • Alcohol use No   • Drug use: No   • Sexual activity: Defer     Other Topics Concern   • Not on file     Social History Narrative       Family History:     Family History   Problem Relation Age of Onset   • Hypertension Father        Review of Systems:     Review of Systems    Physical Exam:     Physical Exam   Constitutional: He is oriented to person, place, and time.   HENT:   Head: Normocephalic and atraumatic.   Right Ear: External ear normal.   Left Ear: External ear normal.   Nose: Nose normal.   Mouth/Throat: Oropharynx is clear and moist.   Eyes: Conjunctivae and EOM are normal. Pupils are equal, round, and reactive to light.   Neck: Normal range of motion. Neck supple. No thyromegaly present.   Cardiovascular: Normal rate, regular rhythm, normal heart sounds and intact distal pulses.    No murmur heard.  Pulmonary/Chest: Effort normal and breath sounds normal. No respiratory distress. He has no wheezes. He has no rales. He exhibits no tenderness.   Abdominal: Soft. Bowel sounds are normal. He exhibits no distension and no mass. There is no tenderness. No hernia.   Genitourinary: Penis normal.   Musculoskeletal: Normal range of motion. He exhibits no edema or tenderness.   Lymphadenopathy:     He has no cervical adenopathy.   Neurological: He is alert and oriented to person, place, and time. No cranial nerve deficit. He exhibits normal muscle tone. Coordination normal.   Skin: Skin is warm. No rash noted.   Psychiatric: He has a normal mood and  affect. His behavior is normal. Judgment and thought content normal.   Nursing note and vitals reviewed.      Procedure:     No notes on file      Assessment:     Encounter Diagnoses   Name Primary?   • Incomplete bladder emptying Yes   • Urinary tract infection, site unspecified    • Sepsis, due to unspecified organism    • Ureteral stone      Orders Placed This Encounter   Procedures   • Bladder Scan       Plan:   Will admit patient for antibiotics and stent placement on left and imaging.    Counseling was given to patient for the following topics impressions. and the interim medical history and current results were reviewed.  A treatment plan with follow-up was made. Total time of the encounter was 45 minutes and 45 minutes were spent discussing Incomplete bladder emptying [R33.9] face-to-face.        This document has been electronically signed by Arnulfo Serrano MD March 8, 2017 2:31 PM

## 2017-03-08 NOTE — H&P (VIEW-ONLY)
Chief Complaint:          Chief Complaint   Patient presents with   • Follow-up     Having left flank pain sinch surgery Monday and retention       HPI:   32 y.o. male.  Pt had ureteroscopy and holmium laser lithotripsy 2 days ago without stent.  He has done well on and off until last night when he had fever chills and sweats.  He stated that his fever went up to 104 last night.  His temp this afternoon was 99.  He was unable to void last night and presented to the office with a bladder full which was scanned for 483 cc by ultrasound and catheter placement.  HPI        Past Medical History:        Past Medical History   Diagnosis Date   • Arthritis      spine   • Hypertension       no medicine   • Kidney stone          Current Meds:     Current Outpatient Prescriptions   Medication Sig Dispense Refill   • ciprofloxacin (CIPRO) 250 MG tablet Take 1 tablet by mouth 2 (Two) Times a Day. 20 tablet 0   • ibuprofen (ADVIL,MOTRIN) 800 MG tablet Take 800 mg by mouth Every 6 (Six) Hours As Needed for mild pain (1-3).     • ondansetron (ZOFRAN) 4 MG tablet Take 1 tablet by mouth Every 8 (Eight) Hours As Needed for nausea or vomiting. 30 tablet 0   • oxyCODONE-acetaminophen (PERCOCET) 5-325 MG per tablet Take 1-2 tablets by mouth Every 6 (Six) Hours As Needed for moderate pain (4-6) or severe pain (7-10). 40 tablet 0   • oxyCODONE-acetaminophen (PERCOCET) 5-325 MG per tablet 1 to 2 Tablets Every 6 Hours as needed for PAIN 40 tablet 0   • tamsulosin (FLOMAX) 0.4 MG capsule 24 hr capsule Take 1 capsule by mouth Daily. 30 capsule 0     No current facility-administered medications for this visit.         Allergies:      No Known Allergies     Past Surgical History:     Past Surgical History   Procedure Laterality Date   • Tonsillectomy     • Tonsillectomy     • Cystoscopy ureteroscopy laser lithotripsy Left 3/6/2017     Procedure: CYSTOSCOPY LEFT URETEROSCOPY HOLMIUM LASER LITHOTRIPSY POSSIBLE STENT PLACEMENT;  Surgeon: Arnulfo  Valeriy Serrano MD;  Location: [facility] OR;  Service:          Social History:     Social History     Social History   • Marital status: Single     Spouse name: N/A   • Number of children: N/A   • Years of education: N/A     Occupational History   • Not on file.     Social History Main Topics   • Smoking status: Former Smoker     Packs/day: 0.50     Years: 3.00     Types: Cigarettes     Quit date: 4/6/2016   • Smokeless tobacco: Former User      Comment: quit  04/2016   • Alcohol use No   • Drug use: No   • Sexual activity: Defer     Other Topics Concern   • Not on file     Social History Narrative       Family History:     Family History   Problem Relation Age of Onset   • Hypertension Father        Review of Systems:     Review of Systems    Physical Exam:     Physical Exam   Constitutional: He is oriented to person, place, and time.   HENT:   Head: Normocephalic and atraumatic.   Right Ear: External ear normal.   Left Ear: External ear normal.   Nose: Nose normal.   Mouth/Throat: Oropharynx is clear and moist.   Eyes: Conjunctivae and EOM are normal. Pupils are equal, round, and reactive to light.   Neck: Normal range of motion. Neck supple. No thyromegaly present.   Cardiovascular: Normal rate, regular rhythm, normal heart sounds and intact distal pulses.    No murmur heard.  Pulmonary/Chest: Effort normal and breath sounds normal. No respiratory distress. He has no wheezes. He has no rales. He exhibits no tenderness.   Abdominal: Soft. Bowel sounds are normal. He exhibits no distension and no mass. There is no tenderness. No hernia.   Genitourinary: Penis normal.   Musculoskeletal: Normal range of motion. He exhibits no edema or tenderness.   Lymphadenopathy:     He has no cervical adenopathy.   Neurological: He is alert and oriented to person, place, and time. No cranial nerve deficit. He exhibits normal muscle tone. Coordination normal.   Skin: Skin is warm. No rash noted.   Psychiatric: He has a normal mood and  affect. His behavior is normal. Judgment and thought content normal.   Nursing note and vitals reviewed.      Procedure:     No notes on file      Assessment:     Encounter Diagnoses   Name Primary?   • Incomplete bladder emptying Yes   • Urinary tract infection, site unspecified    • Sepsis, due to unspecified organism    • Ureteral stone      Orders Placed This Encounter   Procedures   • Bladder Scan       Plan:   Will admit patient for antibiotics and stent placement on left and imaging.    Counseling was given to patient for the following topics impressions. and the interim medical history and current results were reviewed.  A treatment plan with follow-up was made. Total time of the encounter was 45 minutes and 45 minutes were spent discussing Incomplete bladder emptying [R33.9] face-to-face.        This document has been electronically signed by Arnulfo Serrano MD March 8, 2017 2:31 PM

## 2017-03-09 ENCOUNTER — ANESTHESIA EVENT (OUTPATIENT)
Dept: PERIOP | Facility: HOSPITAL | Age: 32
End: 2017-03-09

## 2017-03-09 ENCOUNTER — APPOINTMENT (OUTPATIENT)
Dept: GENERAL RADIOLOGY | Facility: HOSPITAL | Age: 32
End: 2017-03-09

## 2017-03-09 ENCOUNTER — ANESTHESIA (OUTPATIENT)
Dept: PERIOP | Facility: HOSPITAL | Age: 32
End: 2017-03-09

## 2017-03-09 PROBLEM — R33.9 INCOMPLETE BLADDER EMPTYING: Status: ACTIVE | Noted: 2017-03-09

## 2017-03-09 LAB
ANION GAP SERPL CALCULATED.3IONS-SCNC: 5.7 MMOL/L (ref 3.6–11.2)
BASOPHILS # BLD AUTO: 0.03 10*3/MM3 (ref 0–0.3)
BASOPHILS NFR BLD AUTO: 0.2 % (ref 0–2)
BUN BLD-MCNC: 14 MG/DL (ref 7–21)
BUN/CREAT SERPL: 10.2 (ref 7–25)
CALCIUM SPEC-SCNC: 9.3 MG/DL (ref 7.7–10)
CHLORIDE SERPL-SCNC: 101 MMOL/L (ref 99–112)
CO2 SERPL-SCNC: 27.3 MMOL/L (ref 24.3–31.9)
CREAT BLD-MCNC: 1.37 MG/DL (ref 0.43–1.29)
DEPRECATED RDW RBC AUTO: 44.1 FL (ref 37–54)
EOSINOPHIL # BLD AUTO: 0.06 10*3/MM3 (ref 0–0.7)
EOSINOPHIL NFR BLD AUTO: 0.3 % (ref 0–5)
ERYTHROCYTE [DISTWIDTH] IN BLOOD BY AUTOMATED COUNT: 13.3 % (ref 11.5–14.5)
GFR SERPL CREATININE-BSD FRML MDRD: 60 ML/MIN/1.73
GLUCOSE BLD-MCNC: 120 MG/DL (ref 70–110)
HCT VFR BLD AUTO: 39 % (ref 42–52)
HGB BLD-MCNC: 12.6 G/DL (ref 14–18)
IMM GRANULOCYTES # BLD: 0.07 10*3/MM3 (ref 0–0.03)
IMM GRANULOCYTES NFR BLD: 0.4 % (ref 0–0.5)
LYMPHOCYTES # BLD AUTO: 1.84 10*3/MM3 (ref 1–3)
LYMPHOCYTES NFR BLD AUTO: 10.4 % (ref 21–51)
MCH RBC QN AUTO: 30.5 PG (ref 27–33)
MCHC RBC AUTO-ENTMCNC: 32.3 G/DL (ref 33–37)
MCV RBC AUTO: 94.4 FL (ref 80–94)
MONOCYTES # BLD AUTO: 1.35 10*3/MM3 (ref 0.1–0.9)
MONOCYTES NFR BLD AUTO: 7.6 % (ref 0–10)
NEUTROPHILS # BLD AUTO: 14.4 10*3/MM3 (ref 1.4–6.5)
NEUTROPHILS NFR BLD AUTO: 81.1 % (ref 30–70)
OSMOLALITY SERPL CALC.SUM OF ELEC: 269.9 MOSM/KG (ref 273–305)
PLATELET # BLD AUTO: 236 10*3/MM3 (ref 130–400)
PMV BLD AUTO: 11.2 FL (ref 6–10)
POTASSIUM BLD-SCNC: 4.1 MMOL/L (ref 3.5–5.3)
RBC # BLD AUTO: 4.13 10*6/MM3 (ref 4.7–6.1)
SODIUM BLD-SCNC: 134 MMOL/L (ref 135–153)
WBC NRBC COR # BLD: 17.75 10*3/MM3 (ref 4.5–12.5)

## 2017-03-09 PROCEDURE — 0T778DZ DILATION OF LEFT URETER WITH INTRALUMINAL DEVICE, VIA NATURAL OR ARTIFICIAL OPENING ENDOSCOPIC: ICD-10-PCS | Performed by: UROLOGY

## 2017-03-09 PROCEDURE — 25010000002 MORPHINE PER 10 MG: Performed by: UROLOGY

## 2017-03-09 PROCEDURE — 25010000002 HEPARIN (PORCINE) PER 1000 UNITS: Performed by: UROLOGY

## 2017-03-09 PROCEDURE — 25010000002 DEXAMETHASONE PER 1 MG: Performed by: NURSE ANESTHETIST, CERTIFIED REGISTERED

## 2017-03-09 PROCEDURE — 25010000002 FENTANYL CITRATE (PF) 100 MCG/2ML SOLUTION: Performed by: ANESTHESIOLOGY

## 2017-03-09 PROCEDURE — 52332 CYSTOSCOPY AND TREATMENT: CPT | Performed by: UROLOGY

## 2017-03-09 PROCEDURE — 25010000002 MIDAZOLAM PER 1 MG: Performed by: NURSE ANESTHETIST, CERTIFIED REGISTERED

## 2017-03-09 PROCEDURE — C2617 STENT, NON-COR, TEM W/O DEL: HCPCS | Performed by: UROLOGY

## 2017-03-09 PROCEDURE — S0260 H&P FOR SURGERY: HCPCS | Performed by: UROLOGY

## 2017-03-09 PROCEDURE — 25010000002 PROPOFOL 10 MG/ML EMULSION: Performed by: NURSE ANESTHETIST, CERTIFIED REGISTERED

## 2017-03-09 PROCEDURE — 94799 UNLISTED PULMONARY SVC/PX: CPT

## 2017-03-09 PROCEDURE — 85025 COMPLETE CBC W/AUTO DIFF WBC: CPT | Performed by: UROLOGY

## 2017-03-09 PROCEDURE — C1769 GUIDE WIRE: HCPCS | Performed by: UROLOGY

## 2017-03-09 PROCEDURE — 25010000002 ONDANSETRON PER 1 MG: Performed by: NURSE ANESTHETIST, CERTIFIED REGISTERED

## 2017-03-09 PROCEDURE — 76000 FLUOROSCOPY <1 HR PHYS/QHP: CPT

## 2017-03-09 PROCEDURE — 74000 FL SURGERY FLUORO: CPT | Performed by: RADIOLOGY

## 2017-03-09 PROCEDURE — 80048 BASIC METABOLIC PNL TOTAL CA: CPT | Performed by: UROLOGY

## 2017-03-09 DEVICE — URETERAL STENT
Type: IMPLANTABLE DEVICE | Status: FUNCTIONAL
Brand: POLARIS™ ULTRA

## 2017-03-09 RX ORDER — FENTANYL CITRATE 50 UG/ML
25 INJECTION, SOLUTION INTRAMUSCULAR; INTRAVENOUS
Status: DISCONTINUED | OUTPATIENT
Start: 2017-03-09 | End: 2017-03-09 | Stop reason: HOSPADM

## 2017-03-09 RX ORDER — PROPOFOL 10 MG/ML
VIAL (ML) INTRAVENOUS AS NEEDED
Status: DISCONTINUED | OUTPATIENT
Start: 2017-03-09 | End: 2017-03-09 | Stop reason: SURG

## 2017-03-09 RX ORDER — FENTANYL CITRATE 50 UG/ML
50 INJECTION, SOLUTION INTRAMUSCULAR; INTRAVENOUS
Status: DISCONTINUED | OUTPATIENT
Start: 2017-03-09 | End: 2017-03-09 | Stop reason: HOSPADM

## 2017-03-09 RX ORDER — SODIUM CHLORIDE, SODIUM LACTATE, POTASSIUM CHLORIDE, CALCIUM CHLORIDE 600; 310; 30; 20 MG/100ML; MG/100ML; MG/100ML; MG/100ML
150 INJECTION, SOLUTION INTRAVENOUS CONTINUOUS
Status: DISCONTINUED | OUTPATIENT
Start: 2017-03-09 | End: 2017-03-10 | Stop reason: HOSPADM

## 2017-03-09 RX ORDER — MEPERIDINE HYDROCHLORIDE 25 MG/ML
12.5 INJECTION INTRAMUSCULAR; INTRAVENOUS; SUBCUTANEOUS
Status: DISCONTINUED | OUTPATIENT
Start: 2017-03-09 | End: 2017-03-09 | Stop reason: HOSPADM

## 2017-03-09 RX ORDER — MAGNESIUM HYDROXIDE 1200 MG/15ML
LIQUID ORAL AS NEEDED
Status: DISCONTINUED | OUTPATIENT
Start: 2017-03-09 | End: 2017-03-09 | Stop reason: HOSPADM

## 2017-03-09 RX ORDER — ONDANSETRON 2 MG/ML
INJECTION INTRAMUSCULAR; INTRAVENOUS AS NEEDED
Status: DISCONTINUED | OUTPATIENT
Start: 2017-03-09 | End: 2017-03-09 | Stop reason: SURG

## 2017-03-09 RX ORDER — DEXAMETHASONE SODIUM PHOSPHATE 4 MG/ML
INJECTION, SOLUTION INTRA-ARTICULAR; INTRALESIONAL; INTRAMUSCULAR; INTRAVENOUS; SOFT TISSUE AS NEEDED
Status: DISCONTINUED | OUTPATIENT
Start: 2017-03-09 | End: 2017-03-09 | Stop reason: SURG

## 2017-03-09 RX ORDER — MIDAZOLAM HYDROCHLORIDE 1 MG/ML
INJECTION INTRAMUSCULAR; INTRAVENOUS AS NEEDED
Status: DISCONTINUED | OUTPATIENT
Start: 2017-03-09 | End: 2017-03-09 | Stop reason: SURG

## 2017-03-09 RX ORDER — OXYCODONE HYDROCHLORIDE AND ACETAMINOPHEN 5; 325 MG/1; MG/1
1 TABLET ORAL ONCE AS NEEDED
Status: DISCONTINUED | OUTPATIENT
Start: 2017-03-09 | End: 2017-03-09 | Stop reason: HOSPADM

## 2017-03-09 RX ORDER — ONDANSETRON 2 MG/ML
4 INJECTION INTRAMUSCULAR; INTRAVENOUS ONCE AS NEEDED
Status: DISCONTINUED | OUTPATIENT
Start: 2017-03-09 | End: 2017-03-09 | Stop reason: HOSPADM

## 2017-03-09 RX ORDER — IPRATROPIUM BROMIDE AND ALBUTEROL SULFATE 2.5; .5 MG/3ML; MG/3ML
3 SOLUTION RESPIRATORY (INHALATION) ONCE AS NEEDED
Status: DISCONTINUED | OUTPATIENT
Start: 2017-03-09 | End: 2017-03-09 | Stop reason: HOSPADM

## 2017-03-09 RX ORDER — SODIUM CHLORIDE 0.9 % (FLUSH) 0.9 %
1-10 SYRINGE (ML) INJECTION AS NEEDED
Status: DISCONTINUED | OUTPATIENT
Start: 2017-03-09 | End: 2017-03-09 | Stop reason: HOSPADM

## 2017-03-09 RX ORDER — LIDOCAINE HYDROCHLORIDE 20 MG/ML
INJECTION, SOLUTION INFILTRATION; PERINEURAL AS NEEDED
Status: DISCONTINUED | OUTPATIENT
Start: 2017-03-09 | End: 2017-03-09 | Stop reason: SURG

## 2017-03-09 RX ORDER — LEVOFLOXACIN 500 MG/1
500 TABLET, FILM COATED ORAL EVERY 24 HOURS
Status: DISCONTINUED | OUTPATIENT
Start: 2017-03-09 | End: 2017-03-10

## 2017-03-09 RX ADMIN — LIDOCAINE HYDROCHLORIDE 100 MG: 20 INJECTION, SOLUTION INFILTRATION; PERINEURAL at 07:53

## 2017-03-09 RX ADMIN — CEFAZOLIN 1 G: 1 INJECTION, POWDER, FOR SOLUTION INTRAMUSCULAR; INTRAVENOUS; PARENTERAL at 07:55

## 2017-03-09 RX ADMIN — PROPOFOL 200 MG: 10 INJECTION, EMULSION INTRAVENOUS at 07:53

## 2017-03-09 RX ADMIN — DEXAMETHASONE SODIUM PHOSPHATE 4 MG: 4 INJECTION, SOLUTION INTRAMUSCULAR; INTRAVENOUS at 07:53

## 2017-03-09 RX ADMIN — FENTANYL CITRATE: 50 INJECTION, SOLUTION INTRAMUSCULAR; INTRAVENOUS at 07:17

## 2017-03-09 RX ADMIN — Medication: at 01:27

## 2017-03-09 RX ADMIN — TAMSULOSIN HYDROCHLORIDE 0.4 MG: 0.4 CAPSULE ORAL at 20:27

## 2017-03-09 RX ADMIN — ACETAMINOPHEN 650 MG: 325 TABLET ORAL at 05:44

## 2017-03-09 RX ADMIN — DEXTROSE AND SODIUM CHLORIDE 125 ML/HR: 5; 450 INJECTION, SOLUTION INTRAVENOUS at 01:52

## 2017-03-09 RX ADMIN — AMPICILLIN SODIUM AND SULBACTAM SODIUM 3 G: 2; 1 INJECTION, POWDER, FOR SOLUTION INTRAMUSCULAR; INTRAVENOUS at 05:11

## 2017-03-09 RX ADMIN — DEXTROSE AND SODIUM CHLORIDE 125 ML/HR: 5; 450 INJECTION, SOLUTION INTRAVENOUS at 14:04

## 2017-03-09 RX ADMIN — ONDANSETRON 4 MG: 2 INJECTION, SOLUTION INTRAMUSCULAR; INTRAVENOUS at 07:53

## 2017-03-09 RX ADMIN — DOCUSATE SODIUM AND SENNA 2 TABLET: 50; 8.6 TABLET, FILM COATED ORAL at 20:27

## 2017-03-09 RX ADMIN — OXYCODONE HYDROCHLORIDE AND ACETAMINOPHEN 1 TABLET: 5; 325 TABLET ORAL at 18:33

## 2017-03-09 RX ADMIN — SODIUM CHLORIDE, POTASSIUM CHLORIDE, SODIUM LACTATE AND CALCIUM CHLORIDE 125 ML/HR: 600; 310; 30; 20 INJECTION, SOLUTION INTRAVENOUS at 06:57

## 2017-03-09 RX ADMIN — AMPICILLIN SODIUM AND SULBACTAM SODIUM 3 G: 2; 1 INJECTION, POWDER, FOR SOLUTION INTRAMUSCULAR; INTRAVENOUS at 17:34

## 2017-03-09 RX ADMIN — HEPARIN SODIUM 5000 UNITS: 5000 INJECTION, SOLUTION INTRAVENOUS; SUBCUTANEOUS at 20:27

## 2017-03-09 RX ADMIN — LEVOFLOXACIN 500 MG: 500 TABLET, FILM COATED ORAL at 12:01

## 2017-03-09 RX ADMIN — OXYCODONE HYDROCHLORIDE AND ACETAMINOPHEN 1 TABLET: 5; 325 TABLET ORAL at 01:22

## 2017-03-09 RX ADMIN — MIDAZOLAM HYDROCHLORIDE 2 MG: 1 INJECTION, SOLUTION INTRAMUSCULAR; INTRAVENOUS at 07:49

## 2017-03-09 NOTE — ANESTHESIA POSTPROCEDURE EVALUATION
Patient: Ritesh Azevedo    Procedure Summary     Date Anesthesia Start Anesthesia Stop Room / Location    03/09/17 0749 0825  [facility] / [facility]       Procedure Diagnosis Surgeon Provider    CYSTOSCOPY URETERAL CATHETER/STENT INSERTION (Left ) Incomplete bladder emptying; Ureteral stone; Urinary tract infection, site unspecified; Sepsis, due to unspecified organism  (Incomplete bladder emptying [R33.9]; Ureteral stone [N20.1]; Urinary tract infection, site unspecified [N39.0]; Sepsis, due to unspecified organism [A41.9]) MD Justyn Banda DO          Anesthesia Type: general  Last vitals  /72 (03/09/17 1055)    Temp 98.6 °F (37 °C) (03/09/17 1055)    Pulse 87 (03/09/17 1055)   Resp 12 (03/09/17 1055)    SpO2 91 % (03/09/17 1055)      Post Anesthesia Care and Evaluation    Patient location during evaluation: bedside  Patient participation: complete - patient participated  Level of consciousness: awake and alert  Pain score: 1  Pain management: adequate  Airway patency: patent  Anesthetic complications: No anesthetic complications  PONV Status: none  Cardiovascular status: acceptable  Respiratory status: acceptable  Hydration status: acceptable

## 2017-03-09 NOTE — OP NOTE
DATE OF SURGERY:  03/08/2017    PREOPERATIVE DIAGNOSES:   1.  Urosepsis.  2.  Hydronephrosis.     POSTOPERTIVE DIAGNOSES:    1.  Urosepsis.  2.  Hydronephrosis.   3.  Status post stent placement.     PROCEDURES PERFORMED:    1.  Cystoscopy.   2.  Left stent placement.     SURGEON:  Arnulfo Serrano MD    INDICATIONS FOR PROCEDURE: A 32-year-old gentleman who is a couple of days out from ureteroscopy, holmium laser lithotripsy. The stone was broken, and 1 of the fragments was sent for stone analysis and the patient was left to pass the remaining fragments. The patient was left without a stent at the original procedure and he had a history of fever, chills and rigor at home.  He was admitted with diagnosis of urosepsis and started on IV antibiotics. The patient was advised to have cystoscopy and stent placement for drainage.  CAT scan showed that there was stone fragment at the site of the original stone. No stone fragment in the lower pole and retroperitoneal extravasation from the original procedure. The patient also had urinary retention and was catheterized in the office yesterday  when we admitted him. The patient was advised of risks and benefits of the procedure and was willing to proceed.     DESCRIPTION OF PROCEDURE: The patient underwent a general anesthetic, sterilely prepped and draped in dorsal supine position. A 21-[place] Plum Baby cystoscope was passed transurethrally into the bladder. The left ureteral orifice had a fair amount of edema around it from previous procedure, but we were able to get a Glidewire up the left ureter and this went all the way up to the renal pelvis and collecting system. A 5-[place] Polaris double J stent was passed over this Glidewire and placed in proper fluoroscopic and cystoscopic position. The patient had a Cavanaugh catheter placed because he had been in retention and he was taken to the recovery room in satisfactory condition.          D: BONILLA 03/09/2017 08:21:43 ET  T: yvrose /  03/09/2017 09:08:46 ET  Revision Count: 0  Job ID: [id number]  [id number]  cc:        Caroline Signature:___________________________     Arnulfo Serrano M.D.

## 2017-03-09 NOTE — ANESTHESIA PREPROCEDURE EVALUATION
Anesthesia Evaluation     Patient summary reviewed and Nursing notes reviewed   no history of anesthetic complications:  NPO Status: > 8 hours   Airway   Mallampati: III  TM distance: <3 FB  Neck ROM: full  no difficulty expected  Dental - normal exam     Pulmonary - negative pulmonary ROS and normal exam   Cardiovascular - negative cardio ROS and normal exam  Exercise tolerance: excellent (>7 METS)    NYHA Classification: I        Neuro/Psych- negative ROS  GI/Hepatic/Renal/Endo - negative ROS     Musculoskeletal (-) negative ROS    Abdominal  - normal exam    Bowel sounds: normal.   Substance History - negative use     OB/GYN negative ob/gyn ROS         Other - negative ROS                                 Anesthesia Plan    ASA 2     general     intravenous and inhalational induction   Anesthetic plan and risks discussed with patient.  Use of blood products discussed with patient  Consented to blood products.   Plan discussed with CRNA.

## 2017-03-09 NOTE — H&P
Chief Complaint:               Chief Complaint   Patient presents with   • Follow-up       Having left flank pain sinch surgery Monday and retention         HPI:   32 y.o. male. Pt had ureteroscopy and holmium laser lithotripsy 2 days ago without stent. He has done well on and off until last night when he had fever chills and sweats. He stated that his fever went up to 104 last night. His temp this afternoon was 99. He was unable to void last night and presented to the office with a bladder full which was scanned for 483 cc by ultrasound and catheter placement.  HPI           Past Medical History:           Medical History          Past Medical History   Diagnosis Date   • Arthritis         spine   • Hypertension         no medicine   • Kidney stone                 Current Meds:       Current Medications           Current Outpatient Prescriptions   Medication Sig Dispense Refill   • ciprofloxacin (CIPRO) 250 MG tablet Take 1 tablet by mouth 2 (Two) Times a Day. 20 tablet 0   • ibuprofen (ADVIL,MOTRIN) 800 MG tablet Take 800 mg by mouth Every 6 (Six) Hours As Needed for mild pain (1-3).       • ondansetron (ZOFRAN) 4 MG tablet Take 1 tablet by mouth Every 8 (Eight) Hours As Needed for nausea or vomiting. 30 tablet 0   • oxyCODONE-acetaminophen (PERCOCET) 5-325 MG per tablet Take 1-2 tablets by mouth Every 6 (Six) Hours As Needed for moderate pain (4-6) or severe pain (7-10). 40 tablet 0   • oxyCODONE-acetaminophen (PERCOCET) 5-325 MG per tablet 1 to 2 Tablets Every 6 Hours as needed for PAIN 40 tablet 0   • tamsulosin (FLOMAX) 0.4 MG capsule 24 hr capsule Take 1 capsule by mouth Daily. 30 capsule 0      No current facility-administered medications for this visit.              Allergies:       No Known Allergies      Past Surgical History:       Surgical History           Past Surgical History   Procedure Laterality Date   • Tonsillectomy       • Tonsillectomy       • Cystoscopy ureteroscopy laser lithotripsy Left  3/6/2017       Procedure: CYSTOSCOPY LEFT URETEROSCOPY HOLMIUM LASER LITHOTRIPSY POSSIBLE STENT PLACEMENT; Surgeon: Arnulfo Serrano MD; Location: [facility] OR; Service:                Social History:       Social History    Social History            Social History   • Marital status: Single       Spouse name: N/A   • Number of children: N/A   • Years of education: N/A          Occupational History   • Not on file.             Social History Main Topics   • Smoking status: Former Smoker       Packs/day: 0.50       Years: 3.00       Types: Cigarettes       Quit date: 4/6/2016   • Smokeless tobacco: Former User         Comment: quit 04/2016   • Alcohol use No   • Drug use: No   • Sexual activity: Defer      Other Topics Concern   • Not on file      Social History Narrative            Family History:            Family History   Problem Relation Age of Onset   • Hypertension Father           Review of Systems:      Review of Systems     Physical Exam:      Physical Exam   Constitutional: He is oriented to person, place, and time.   HENT:   Head: Normocephalic and atraumatic.   Right Ear: External ear normal.   Left Ear: External ear normal.   Nose: Nose normal.   Mouth/Throat: Oropharynx is clear and moist.   Eyes: Conjunctivae and EOM are normal. Pupils are equal, round, and reactive to light.   Neck: Normal range of motion. Neck supple. No thyromegaly present.   Cardiovascular: Normal rate, regular rhythm, normal heart sounds and intact distal pulses.   No murmur heard.  Pulmonary/Chest: Effort normal and breath sounds normal. No respiratory distress. He has no wheezes. He has no rales. He exhibits no tenderness.   Abdominal: Soft. Bowel sounds are normal. He exhibits no distension and no mass. There is no tenderness. No hernia.   Genitourinary: Penis normal.   Musculoskeletal: Normal range of motion. He exhibits no edema or tenderness.   Lymphadenopathy:   He has no cervical adenopathy.   Neurological: He is alert  and oriented to person, place, and time. No cranial nerve deficit. He exhibits normal muscle tone. Coordination normal.   Skin: Skin is warm. No rash noted.   Psychiatric: He has a normal mood and affect. His behavior is normal. Judgment and thought content normal.   Nursing note and vitals reviewed.        Procedure:      No notes on file        Assessment:           Encounter Diagnoses   Name Primary?   • Incomplete bladder emptying Yes   • Urinary tract infection, site unspecified     • Sepsis, due to unspecified organism     • Ureteral stone            Orders Placed This Encounter   Procedures   • Bladder Scan         Plan:   Will admit patient for antibiotics and stent placement on left and imaging.     Counseling was given to patient for the following topics impressions. and the interim medical history and current results were reviewed. A treatment plan with follow-up was made. Total time of the encounter was 45 minutes and 45 minutes were spent discussing Incomplete bladder emptying [R33.9] face-to-face.

## 2017-03-09 NOTE — PLAN OF CARE
Problem: Patient Care Overview (Adult)  Goal: Plan of Care Review  Outcome: Ongoing (interventions implemented as appropriate)    Problem: Pain, Acute (Adult)  Goal: Identify Related Risk Factors and Signs and Symptoms  Outcome: Ongoing (interventions implemented as appropriate)  Goal: Acceptable Pain Control/Comfort Level  Outcome: Ongoing (interventions implemented as appropriate)    Problem: Anesthesia, General (Adult)  Goal: Signs and Symptoms of Listed Potential Problems Will be Absent or Manageable (Anesthesia, General)  Outcome: Ongoing (interventions implemented as appropriate)

## 2017-03-09 NOTE — ANESTHESIA PROCEDURE NOTES
Airway  Urgency: elective    Airway not difficult    General Information and Staff    Patient location during procedure: OR  CRNA: FLAVIO WELLINGTON    Indications and Patient Condition    Preoxygenated: yes  MILS maintained throughout  Mask difficulty assessment: 0 - not attempted    Final Airway Details  Final airway type: supraglottic airway      Successful airway: unique  Size 5    Number of attempts at approach: 1    Additional Comments  LMA placed without difficulty.  Head and neck maintained midline.  Lips and teeth as per preop.

## 2017-03-09 NOTE — PLAN OF CARE
Problem: Patient Care Overview (Adult)  Goal: Plan of Care Review  Outcome: Ongoing (interventions implemented as appropriate)    03/09/17 0202   Coping/Psychosocial Response Interventions   Plan Of Care Reviewed With patient   Patient Care Overview   Progress no change         Problem: Pain, Acute (Adult)  Goal: Identify Related Risk Factors and Signs and Symptoms  Outcome: Ongoing (interventions implemented as appropriate)    03/09/17 0202   Pain, Acute   Related Risk Factors (Acute Pain) patient perception   Signs and Symptoms (Acute Pain) verbalization of pain descriptors       Goal: Acceptable Pain Control/Comfort Level  Outcome: Ongoing (interventions implemented as appropriate)    03/09/17 0202   Pain, Acute (Adult)   Acceptable Pain Control/Comfort Level making progress toward outcome

## 2017-03-09 NOTE — PLAN OF CARE
Problem: Anesthesia, General (Adult)  Goal: Signs and Symptoms of Listed Potential Problems Will be Absent or Manageable (Anesthesia, General)  Outcome: Ongoing (interventions implemented as appropriate)

## 2017-03-09 NOTE — PROGRESS NOTES
Discharge Planning Assessment  [facility]     Patient Name: Ritesh Azevedo  MRN: [id number]  Today's Date: 3/9/2017    Admit Date: 3/8/2017          Discharge Needs Assessment       03/09/17 1317    Living Environment    Lives With significant other   Lives with s/o Sammi Polk.    Living Arrangements house    Quality Of Family Relationships supportive    Able to Return to Prior Living Arrangements yes    Discharge Needs Assessment    Concerns To Be Addressed no discharge needs identified    Readmission Within The Last 30 Days no previous admission in last 30 days    Equipment Currently Used at Home none    Equipment Needed After Discharge none    Transportation Available car   Independent with transportation. Family will provide discharge transportation.    Discharge Disposition home or self-care   He will return home at discharge.            Discharge Plan       03/09/17 1319    Case Management/Social Work Plan    Plan Plans to return home at discharge with good family support.    Patient/Family In Agreement With Plan yes    Additional Comments Admitted from MD office with Incomplete bladder emptying. Today he had a Cysto and Left stent insertion. Receiving IV fluids, IV ATB's and PO ATB's. He has a Morphine PCA for pain control. No CM needs identified at this time. Will follow and assist as needed.        Discharge Placement     No information found                Demographic Summary       03/09/17 1314    Referral Information    Admission Type inpatient    Arrived From admitted as an inpatient;home or self-care;still a patient    Referral Source admission list    Reason For Consult discharge planning    Record Reviewed medical record    Primary Care Physician Information    Name DOLORES Hernandez-PCP.            Functional Status       03/09/17 1315    Functional Status Current    Ambulation 0-->independent    Transferring 0-->independent    Toileting 0-->independent    Bathing 0-->independent     Dressing 0-->independent    Eating 0-->independent    Communication 0-->understands/communicates without difficulty    Swallowing (if score 2 or more for any item, consult Rehab Services) 0-->swallows foods/liquids without difficulty    Functional Status Prior    Ambulation 0-->independent    Transferring 0-->independent    Toileting 0-->independent    Bathing 0-->independent    Dressing 0-->independent    Eating 0-->independent    Communication 0-->understands/communicates without difficulty    Swallowing 0-->swallows foods/liquids without difficulty    IADL    Medications independent    Meal Preparation independent    Housekeeping independent    Laundry independent    Shopping independent    Oral Care independent    Activity Tolerance    Current Activity Limitations --   Per MD orders.    Usual Activity Tolerance excellent    Current Activity Tolerance good    Cognitive/Perceptual/Developmental    Current Mental Status/Cognitive Functioning no deficits noted   Alert and oriented.    Recent Changes in Mental Status/Cognitive Functioning no changes    Employment/Financial    Financial Concerns none   He has [employer] coverage. Utilizes [employer] to obtain medications. Denies medication or financial issues.            Psychosocial     None            Abuse/Neglect     None            Legal       03/09/17 1322    Legal    Assistance with Managing/Advocating Healthcare Needs --   He does not have a POA or Advance directive.            Substance Abuse     None            Patient Forms     None          Gina Chapman, EDWIN

## 2017-03-09 NOTE — PAYOR COMM NOTE
"CONTACT:  MARGO SELF RN, BSN  UTILIZATION MANAGEMENT DEPT.  [address], [address]  PHONE:  [phone]  FAX: [phone]    REQUEST FOR INPATIENT AUTHORIZATION.     PENDING REF # [id number]    Ariel Shaffer (32 y.o. Male)     Date of Birth Social Security Number Address Home Phone MRN    1985  [address] [facility] [address]    [facility] [address] [phone] [id number]    Congregation Marital Status          None Single       Admission Date Admission Type Admitting Provider Attending Provider Department, Room/Bed    3/8/17 Elective Arnulfo Serrano MD [place], Raymond Allen, MD [facility] 2 [facility], 212/2S    Discharge Date Discharge Disposition Discharge Destination                      Attending Provider: Arnulfo Serrano MD     Allergies:  No Known Allergies    Isolation:  None   Infection:  None   Code Status:  FULL    Ht:  72\" (182.9 cm)   Wt:  221 lb (100 kg)    Admission Cmt:  None   Principal Problem:  None                Active Insurance as of 3/8/2017     Primary Coverage     Payor Plan Insurance Group Employer/Plan Group    [employer] BLUE CROSS [employer] BLUE CROSS BLUE [employer]IELD PPO [id number]     Payor Plan Address Payor Plan Phone Number Effective From Effective To    [address] [phone] 1/1/2014     [place] [id number]       Subscriber Name Subscriber Birth Date Member ID       ARIEL SHAFFER 1985 [id number]                 Emergency Contacts      (Rel.) Home Phone Work Phone Mobile Phone    Sammi Polk (Significant Other) [phone] -- --               History & Physical      Arnulfo Serrano MD at 3/8/2017  1:30 PM          Chief Complaint:          Chief Complaint   Patient presents with   • Follow-up     Having left flank pain sinch surgery Monday and retention       HPI:   32 y.o. male.  Pt had ureteroscopy and holmium laser lithotripsy 2 days ago without stent.  He has done well on and off until " last night when he had fever chills and sweats.  He stated that his fever went up to 104 last night.  His temp this afternoon was 99.  He was unable to void last night and presented to the office with a bladder full which was scanned for 483 cc by ultrasound and catheter placement.  HPI        Past Medical History:        Past Medical History   Diagnosis Date   • Arthritis      spine   • Hypertension       no medicine   • Kidney stone          Current Meds:     Current Outpatient Prescriptions   Medication Sig Dispense Refill   • ciprofloxacin (CIPRO) 250 MG tablet Take 1 tablet by mouth 2 (Two) Times a Day. 20 tablet 0   • ibuprofen (ADVIL,MOTRIN) 800 MG tablet Take 800 mg by mouth Every 6 (Six) Hours As Needed for mild pain (1-3).     • ondansetron (ZOFRAN) 4 MG tablet Take 1 tablet by mouth Every 8 (Eight) Hours As Needed for nausea or vomiting. 30 tablet 0   • oxyCODONE-acetaminophen (PERCOCET) 5-325 MG per tablet Take 1-2 tablets by mouth Every 6 (Six) Hours As Needed for moderate pain (4-6) or severe pain (7-10). 40 tablet 0   • oxyCODONE-acetaminophen (PERCOCET) 5-325 MG per tablet 1 to 2 Tablets Every 6 Hours as needed for PAIN 40 tablet 0   • tamsulosin (FLOMAX) 0.4 MG capsule 24 hr capsule Take 1 capsule by mouth Daily. 30 capsule 0     No current facility-administered medications for this visit.         Allergies:      No Known Allergies     Past Surgical History:     Past Surgical History   Procedure Laterality Date   • Tonsillectomy     • Tonsillectomy     • Cystoscopy ureteroscopy laser lithotripsy Left 3/6/2017     Procedure: CYSTOSCOPY LEFT URETEROSCOPY HOLMIUM LASER LITHOTRIPSY POSSIBLE STENT PLACEMENT;  Surgeon: Arnulfo Serrano MD;  Location: [facility];  Service:          Social History:     Social History     Social History   • Marital status: Single     Spouse name: N/A   • Number of children: N/A   • Years of education: N/A     Occupational History   • Not on file.     Social History Main  Topics   • Smoking status: Former Smoker     Packs/day: 0.50     Years: 3.00     Types: Cigarettes     Quit date: 4/6/2016   • Smokeless tobacco: Former User      Comment: quit  04/2016   • Alcohol use No   • Drug use: No   • Sexual activity: Defer     Other Topics Concern   • Not on file     Social History Narrative       Family History:     Family History   Problem Relation Age of Onset   • Hypertension Father        Review of Systems:     Review of Systems    Physical Exam:     Physical Exam   Constitutional: He is oriented to person, place, and time.   HENT:   Head: Normocephalic and atraumatic.   Right Ear: External ear normal.   Left Ear: External ear normal.   Nose: Nose normal.   Mouth/Throat: Oropharynx is clear and moist.   Eyes: Conjunctivae and EOM are normal. Pupils are equal, round, and reactive to light.   Neck: Normal range of motion. Neck supple. No thyromegaly present.   Cardiovascular: Normal rate, regular rhythm, normal heart sounds and intact distal pulses.    No murmur heard.  Pulmonary/Chest: Effort normal and breath sounds normal. No respiratory distress. He has no wheezes. He has no rales. He exhibits no tenderness.   Abdominal: Soft. Bowel sounds are normal. He exhibits no distension and no mass. There is no tenderness. No hernia.   Genitourinary: Penis normal.   Musculoskeletal: Normal range of motion. He exhibits no edema or tenderness.   Lymphadenopathy:     He has no cervical adenopathy.   Neurological: He is alert and oriented to person, place, and time. No cranial nerve deficit. He exhibits normal muscle tone. Coordination normal.   Skin: Skin is warm. No rash noted.   Psychiatric: He has a normal mood and affect. His behavior is normal. Judgment and thought content normal.   Nursing note and vitals reviewed.      Procedure:     No notes on file      Assessment:     Encounter Diagnoses   Name Primary?   • Incomplete bladder emptying Yes   • Urinary tract infection, site unspecified     • Sepsis, due to unspecified organism    • Ureteral stone      Orders Placed This Encounter   Procedures   • Bladder Scan       Plan:   Will admit patient for antibiotics and stent placement on left and imaging.    Counseling was given to patient for the following topics impressions. and the interim medical history and current results were reviewed.  A treatment plan with follow-up was made. Total time of the encounter was 45 minutes and 45 minutes were spent discussing Incomplete bladder emptying [R33.9] face-to-face.        This document has been electronically signed by Arnulfo Serrano MD March 8, 2017 2:31 PM     Electronically signed by Arnulfo Serrano MD at 3/8/2017  2:39 PM      Arnulfo Serrano MD at 3/8/2017  4:37 PM            H&P reviewed. The patient was examined and there are no changes to the H&P.         Electronically signed by Arnulfo Serrano MD at 3/8/2017  4:37 PM      Arnulfo Serrano MD at 3/9/2017  6:39 AM          Chief Complaint:               Chief Complaint   Patient presents with   • Follow-up       Having left flank pain sinch surgery Monday and retention         HPI:   32 y.o. male. Pt had ureteroscopy and holmium laser lithotripsy 2 days ago without stent. He has done well on and off until last night when he had fever chills and sweats. He stated that his fever went up to 104 last night. His temp this afternoon was 99. He was unable to void last night and presented to the office with a bladder full which was scanned for 483 cc by ultrasound and catheter placement.  HPI           Past Medical History:           Medical History          Past Medical History   Diagnosis Date   • Arthritis         spine   • Hypertension         no medicine   • Kidney stone                 Current Meds:       Current Medications           Current Outpatient Prescriptions   Medication Sig Dispense Refill   • ciprofloxacin (CIPRO) 250 MG tablet Take 1 tablet by mouth 2 (Two) Times  a Day. 20 tablet 0   • ibuprofen (ADVIL,MOTRIN) 800 MG tablet Take 800 mg by mouth Every 6 (Six) Hours As Needed for mild pain (1-3).       • ondansetron (ZOFRAN) 4 MG tablet Take 1 tablet by mouth Every 8 (Eight) Hours As Needed for nausea or vomiting. 30 tablet 0   • oxyCODONE-acetaminophen (PERCOCET) 5-325 MG per tablet Take 1-2 tablets by mouth Every 6 (Six) Hours As Needed for moderate pain (4-6) or severe pain (7-10). 40 tablet 0   • oxyCODONE-acetaminophen (PERCOCET) 5-325 MG per tablet 1 to 2 Tablets Every 6 Hours as needed for PAIN 40 tablet 0   • tamsulosin (FLOMAX) 0.4 MG capsule 24 hr capsule Take 1 capsule by mouth Daily. 30 capsule 0      No current facility-administered medications for this visit.              Allergies:       No Known Allergies      Past Surgical History:       Surgical History           Past Surgical History   Procedure Laterality Date   • Tonsillectomy       • Tonsillectomy       • Cystoscopy ureteroscopy laser lithotripsy Left 3/6/2017       Procedure: CYSTOSCOPY LEFT URETEROSCOPY HOLMIUM LASER LITHOTRIPSY POSSIBLE STENT PLACEMENT; Surgeon: Arnulfo Serrano MD; Location: [facility]; Service:                Social History:       Social History    Social History            Social History   • Marital status: Single       Spouse name: N/A   • Number of children: N/A   • Years of education: N/A          Occupational History   • Not on file.             Social History Main Topics   • Smoking status: Former Smoker       Packs/day: 0.50       Years: 3.00       Types: Cigarettes       Quit date: 4/6/2016   • Smokeless tobacco: Former User         Comment: quit 04/2016   • Alcohol use No   • Drug use: No   • Sexual activity: Defer           Other Topics Concern   • Not on file      Social History Narrative            Family History:            Family History   Problem Relation Age of Onset   • Hypertension Father           Review of Systems:      Review of Systems     Physical Exam:       Physical Exam   Constitutional: He is oriented to person, place, and time.   HENT:   Head: Normocephalic and atraumatic.   Right Ear: External ear normal.   Left Ear: External ear normal.   Nose: Nose normal.   Mouth/Throat: Oropharynx is clear and moist.   Eyes: Conjunctivae and EOM are normal. Pupils are equal, round, and reactive to light.   Neck: Normal range of motion. Neck supple. No thyromegaly present.   Cardiovascular: Normal rate, regular rhythm, normal heart sounds and intact distal pulses.   No murmur heard.  Pulmonary/Chest: Effort normal and breath sounds normal. No respiratory distress. He has no wheezes. He has no rales. He exhibits no tenderness.   Abdominal: Soft. Bowel sounds are normal. He exhibits no distension and no mass. There is no tenderness. No hernia.   Genitourinary: Penis normal.   Musculoskeletal: Normal range of motion. He exhibits no edema or tenderness.   Lymphadenopathy:   He has no cervical adenopathy.   Neurological: He is alert and oriented to person, place, and time. No cranial nerve deficit. He exhibits normal muscle tone. Coordination normal.   Skin: Skin is warm. No rash noted.   Psychiatric: He has a normal mood and affect. His behavior is normal. Judgment and thought content normal.   Nursing note and vitals reviewed.        Procedure:      No notes on file        Assessment:           Encounter Diagnoses   Name Primary?   • Incomplete bladder emptying Yes   • Urinary tract infection, site unspecified     • Sepsis, due to unspecified organism     • Ureteral stone            Orders Placed This Encounter   Procedures   • Bladder Scan         Plan:   Will admit patient for antibiotics and stent placement on left and imaging.     Counseling was given to patient for the following topics impressions. and the interim medical history and current results were reviewed. A treatment plan with follow-up was made. Total time of the encounter was 45 minutes and 45 minutes were  spent discussing Incomplete bladder emptying [R33.9] face-to-face.       Electronically signed by Arnulfo Serrano MD at 3/9/2017  6:39 AM      Arnulfo Serrano MD at 3/9/2017  6:40 AM            H&P reviewed. The patient was examined and there are no changes to the H&P.         Electronically signed by Arnulfo Serrano MD at 3/9/2017  6:40 AM        Vital Signs (last 24 hours)       03/08 0700  -  03/09 0659 03/09 0700  -  03/09 1005   Most Recent    Temp (°F) 98.1 -  (!)101.1    98.5 -  99.3     98.5 (36.9)    Heart Rate 92 -  109    88 -  97     92    Resp 12 -  18    14 -  18     16    /78 -  130/86    100/67 -  123/86     117/71    SpO2 (%) 90 -  97    92 -  98     92          Intake & Output (last day)       03/08 0701 - 03/09 0700 03/09 0701 - 03/10 0700    I.V. (mL/kg) 1011.9 (10.1) 900 (9)    IV Piggyback 100     Irrigation 240     Total Intake(mL/kg) 1351.9 (13.5) 900 (9)    Urine (mL/kg/hr) 925     Total Output 925      Net +426.9 +900              Hospital Medications (all)       Dose Frequency Start End    acetaminophen (TYLENOL) tablet 650 mg 650 mg Every 4 Hours PRN 3/8/2017     Sig - Route: Take 2 tablets by mouth Every 4 (Four) Hours As Needed for fever. - Oral    ampicillin-sulbactam 3 g/100 mL 0.9% NS (MBP) 3 g Every 12 Hours 3/8/2017     Sig - Route: Infuse 100 mL into a venous catheter Every 12 (Twelve) Hours. - Intravenous    calcium carbonate (TUMS) chewable tablet 500 mg (200 mg elemental) 2 tablet 2 Times Daily PRN 3/8/2017     Sig - Route: Chew 1,000 mg 2 (Two) Times a Day As Needed for heartburn. - Oral    ceFAZolin (ANCEF) 1 g/100 mL 0.9% NS IVPB (mbp) 1 g On Call to O.R. 3/9/2017 3/9/2017    Sig - Route: Infuse 100 mL into a venous catheter On Call to the Operating Room. - Intravenous    dextrose 5 % and sodium chloride 0.45 % infusion 125 mL/hr Continuous 3/8/2017     Sig - Route: Infuse 125 mL/hr into a venous catheter Continuous. - Intravenous    heparin  (porcine) 5000 UNIT/ML injection 5,000 Units 5,000 Units Every 12 Hours Scheduled 3/8/2017     Sig - Route: Inject 1 mL under the skin Every 12 (Twelve) Hours. - Subcutaneous    lactated ringers infusion 150 mL/hr Continuous 3/9/2017     Sig - Route: Infuse 150 mL/hr into a venous catheter Continuous. - Intravenous    levoFLOXacin (LEVAQUIN) tablet 500 mg 500 mg Every 24 Hours 3/9/2017     Sig - Route: Take 1 tablet by mouth Daily. - Oral    Morphine sulfate PCA 1 mg/mL  Continuous 3/8/2017 3/18/2017    Sig - Route: Infuse  into a venous catheter Continuous. - Intravenous    naloxone (NARCAN) injection 0.1 mg 0.1 mg Every 5 Minutes PRN 3/8/2017     Sig - Route: Infuse 0.25 mL into a venous catheter Every 5 (Five) Minutes As Needed for opioid reversal or respiratory depression (see administration instructions). - Intravenous    ondansetron (ZOFRAN) tablet 4 mg 4 mg Every 8 Hours PRN 3/8/2017 3/2/2018    Sig - Route: Take 1 tablet by mouth Every 8 (Eight) Hours As Needed for Nausea or Vomiting. - Oral    oxyCODONE-acetaminophen (PERCOCET) 5-325 MG per tablet 1 tablet 1 tablet Every 6 Hours PRN 3/8/2017     Sig - Route: Take 1 tablet by mouth Every 6 (Six) Hours As Needed for severe pain (7-10). - Oral    promethazine (PHENERGAN) 12.5 mg in sodium chloride 0.9 % 50 mL 12.5 mg Every 6 Hours PRN 3/8/2017     Sig - Route: Infuse 12.5 mg into a venous catheter Every 6 (Six) Hours As Needed for Nausea or Vomiting. - Intravenous    sennosides-docusate sodium (SENOKOT-S) 8.6-50 MG tablet 2 tablet 2 tablet Nightly 3/8/2017     Sig - Route: Take 2 tablets by mouth Every Night. - Oral    sodium chloride 0.9 % flush 1-10 mL 1-10 mL As Needed 3/8/2017     Sig - Route: Infuse 1-10 mL into a venous catheter As Needed for line care. - Intravenous    tamsulosin (FLOMAX) 24 hr capsule 0.4 mg 0.4 mg Nightly 3/8/2017     Sig - Route: Take 1 capsule by mouth Every Night. - Oral    FentaNYL Citrate (PF) (SUBLIMAZE) injection 25 mcg  (Discontinued) 25 mcg Every 5 Minutes PRN 3/9/2017 3/9/2017    Sig - Route: Infuse 0.5 mL into a venous catheter Every 5 (Five) Minutes As Needed for severe pain (7-10). - Intravenous    Reason for Discontinue: Patient Transfer    FentaNYL Citrate (PF) (SUBLIMAZE) injection 50 mcg (Discontinued) 50 mcg Every 5 Minutes PRN 3/9/2017 3/9/2017    Sig - Route: Infuse 1 mL into a venous catheter Every 5 (Five) Minutes As Needed for moderate pain (4-6) or severe pain (7-10). - Intravenous    Reason for Discontinue: Patient Transfer    ipratropium-albuterol (DUO-NEB) nebulizer solution 3 mL (Discontinued) 3 mL Once As Needed 3/9/2017 3/9/2017    Sig - Route: Take 3 mL by nebulization 1 (One) Time As Needed for Wheezing or shortness of air (bronchospasm). - Nebulization    Reason for Discontinue: Patient Transfer    levoFLOXacin (LEVAQUIN) tablet 250 mg (Discontinued) 250 mg Every 24 Hours 3/8/2017 3/9/2017    Sig - Route: Take 1 tablet by mouth Daily. - Oral    Reason for Discontinue: Duplicate order    meperidine (DEMEROL) injection 12.5 mg (Discontinued) 12.5 mg Every 5 Minutes PRN 3/9/2017 3/9/2017    Sig - Route: Infuse 0.5 mL into a venous catheter Every 5 (Five) Minutes As Needed for shivering (May repeat x 1). - Intravenous    Reason for Discontinue: Patient Transfer    ondansetron (ZOFRAN) injection 4 mg (Discontinued) 4 mg Once As Needed 3/9/2017 3/9/2017    Sig - Route: Infuse 2 mL into a venous catheter 1 (One) Time As Needed for Nausea or Vomiting (may repeat times one dose). - Intravenous    Reason for Discontinue: Patient Transfer    oxyCODONE-acetaminophen (PERCOCET) 5-325 MG per tablet 1 tablet (Discontinued) 1 tablet Once As Needed 3/9/2017 3/9/2017    Sig - Route: Take 1 tablet by mouth 1 (One) Time As Needed (pain). - Oral    Reason for Discontinue: Patient Transfer    sodium chloride 0.9 % flush 1-10 mL (Discontinued) 1-10 mL As Needed 3/9/2017 3/9/2017    Sig - Route: Infuse 1-10 mL into a venous  catheter As Needed for line care. - Intravenous    Reason for Discontinue: Patient Transfer    sterile water irrigation solution (Discontinued)  As Needed 3/9/2017 3/9/2017    Sig: As Needed.    Reason for Discontinue: Patient Discharge            Lab Results (last 24 hours)     Procedure Component Value Units Date/Time    CBC & Differential [[id number]] Collected:  03/08/17 1652    Specimen:  Blood Updated:  03/08/17 1701    Narrative:       The following orders were created for panel order CBC & Differential.  Procedure                               Abnormality         Status                     ---------                               -----------         ------                     CBC Auto Differential[[id number]]         Abnormal            Final result                 Please view results for these tests on the individual orders.    CBC Auto Differential [[id number]]  (Abnormal) Collected:  03/08/17 1652    Specimen:  Blood Updated:  03/08/17 1701     WBC 14.13 (H) 10*3/mm3      RBC 4.35 (L) 10*6/mm3      Hemoglobin 13.2 (L) g/dL      Hematocrit 40.4 (L) %      MCV 92.9 fL      MCH 30.3 pg      MCHC 32.7 (L) g/dL      RDW 13.2 %      RDW-SD 43.5 fl      MPV 10.9 (H) fL      Platelets 233 10*3/mm3      Neutrophil % 77.7 (H) %      Lymphocyte % 13.7 (L) %      Monocyte % 7.3 %      Eosinophil % 0.6 %      Basophil % 0.2 %      Immature Grans % 0.5 %      Neutrophils, Absolute 10.98 (H) 10*3/mm3      Lymphocytes, Absolute 1.94 10*3/mm3      Monocytes, Absolute 1.03 (H) 10*3/mm3      Eosinophils, Absolute 0.08 10*3/mm3      Basophils, Absolute 0.03 10*3/mm3      Immature Grans, Absolute 0.07 (H) 10*3/mm3     Comprehensive Metabolic Panel [[id number]]  (Abnormal) Collected:  03/08/17 1652    Specimen:  Blood Updated:  03/08/17 1749     Glucose 94 mg/dL      BUN 19 mg/dL      Creatinine 1.54 (H) mg/dL      Sodium 136 mmol/L      Potassium 3.9 mmol/L      Chloride 100 mmol/L      CO2 28.2 mmol/L      Calcium 9.9 mg/dL       Total Protein 7.2 g/dL      Albumin 4.40 g/dL      ALT (SGPT) 61 (H) U/L      AST (SGOT) 29 U/L      Alkaline Phosphatase 101 U/L       Note New Reference Ranges        Total Bilirubin 0.7 mg/dL      eGFR Non African Amer 53 (L) mL/min/1.73      Globulin 2.8 gm/dL      A/G Ratio 1.6 g/dL      BUN/Creatinine Ratio 12.3      Anion Gap 7.8 mmol/L     Osmolality, Calculated [[id number]]  (Normal) Collected:  03/08/17 1652    Specimen:  Blood Updated:  03/08/17 1800     Osmolality Calc 274.0 mOsm/kg     CBC Auto Differential [[id number]]  (Abnormal) Collected:  03/09/17 0338    Specimen:  Blood Updated:  03/09/17 0427     WBC 17.75 (H) 10*3/mm3      RBC 4.13 (L) 10*6/mm3      Hemoglobin 12.6 (L) g/dL      Hematocrit 39.0 (L) %      MCV 94.4 (H) fL      MCH 30.5 pg      MCHC 32.3 (L) g/dL      RDW 13.3 %      RDW-SD 44.1 fl      MPV 11.2 (H) fL      Platelets 236 10*3/mm3      Neutrophil % 81.1 (H) %      Lymphocyte % 10.4 (L) %      Monocyte % 7.6 %      Eosinophil % 0.3 %      Basophil % 0.2 %      Immature Grans % 0.4 %      Neutrophils, Absolute 14.40 (H) 10*3/mm3      Lymphocytes, Absolute 1.84 10*3/mm3      Monocytes, Absolute 1.35 (H) 10*3/mm3      Eosinophils, Absolute 0.06 10*3/mm3      Basophils, Absolute 0.03 10*3/mm3      Immature Grans, Absolute 0.07 (H) 10*3/mm3     CBC & Differential [[id number]] Collected:  03/09/17 0338    Specimen:  Blood Updated:  03/09/17 0427    Narrative:       The following orders were created for panel order CBC & Differential.  Procedure                               Abnormality         Status                     ---------                               -----------         ------                     CBC Auto Differential[[id number]]         Abnormal            Final result                 Please view results for these tests on the individual orders.    Basic Metabolic Panel [[id number]]  (Abnormal) Collected:  03/09/17 0338    Specimen:  Blood Updated:  03/09/17 0454     Glucose 120 (H)  mg/dL      BUN 14 mg/dL      Creatinine 1.37 (H) mg/dL      Sodium 134 (L) mmol/L      Potassium 4.1 mmol/L      Chloride 101 mmol/L      CO2 27.3 mmol/L      Calcium 9.3 mg/dL      eGFR Non African Amer 60 (L) mL/min/1.73      BUN/Creatinine Ratio 10.2      Anion Gap 5.7 mmol/L     Narrative:       GFR Normal >60  Chronic Kidney Disease <60  Kidney Failure <15    Osmolality, Calculated [[id number]]  (Abnormal) Collected:  03/09/17 0338    Specimen:  Blood Updated:  03/09/17 0454     Osmolality Calc 269.9 (L) mOsm/kg         Imaging Results (last 24 hours)     Procedure Component Value Units Date/Time    CT Abdomen Pelvis Stone Protocol [[id number]] Collected:  03/09/17 0627     Updated:  03/09/17 0633    Narrative:          CT ABDOMEN PELVIS STONE PROTOCOL-        TECHNIQUE: Multiple axial CT images were obtained from lung bases  through pubic symphysis without administration of IV contrast.  Reformatted images in the coronal and/or sagittal plane(s) were  generated from the axial data set to facilitate diagnostic accuracy  and/or surgical planning.  Oral Contrast:NONE.     Radiation dose reduction techniques were utilized per ALARA protocol.  Automated exposure control was initiated through either or CareDoRobodrom or  DoseRigTitansan software packages by  protocol.       1514.36103 mGy.cm     Clinical information  stone, sepsis      Comparison  NONE.     Findings  LOWER THORAX: BIBASILAR ATELECTASIS AND TINY BILATERAL PLEURAL  EFFUSIONS.     ABDOMEN:        LIVER: Homogeneous. No focal hepatic mass or ductal dilatation.        GALLBLADDER: No dilation or stone identified.        PANCREAS: Unremarkable. No mass or ductal dilatation.        SPLEEN: Homogeneous. No splenomegaly.        ADRENALS: No mass.        KIDNEYS: EXTENSIVE STRANDING AND FLUID IDENTIFIED AROUND THE LEFT  KIDNEY WITH PERSISTENT MILD LEFT HYDRONEPHROSIS THAT APPEARS TO BE  ASSOCIATED WITH A LEFT PROXIMAL URETERAL CALCULUS MEASURING 6  MM.  NONOBSTRUCTIVE RIGHT RENAL CALCULI ARE IDENTIFIED.        GI TRACT: Non-dilated. No definite wall thickening.        PERITONEUM: No free air. No free fluid or loculated fluid  collections.        MESENTERY: Unremarkable.        LYMPH NODES: No lymphadenopathy.        VASCULATURE: No evidence of aneurysm.        ABDOMINAL WALL: No focal hernia or mass.           OTHER: None.     PELVIS:        BLADDER: A BRIZUELA CATHETER IS IDENTIFIED WITHIN THE URINARY BLADDER  WITH POSSIBLE TIP EXTENDING TOWARDS THE LEFT UVJ.        REPRODUCTIVE: Unremarkable as visualized.        APPENDIX: Nondistended. No surrounding inflammation.     BONES: No acute bony abnormality.       Impression:       Impression:  Extensive stranding and fluid identified around the left kidney with  persistent mild left hydronephrosis that appears to be associated with a  left proximal ureteral calculus measuring 6 mm. A Brizuela catheter is  identified within the urinary bladder with possible tip extending  towards the left UVJ. Nonobstructive right renal calculi are identified.        This report was finalized on 3/9/2017 6:31 AM by Dr. Crescencio Ballesteros MD.       [place] [facility] [[id number]]      Updated:  03/09/17 0927          Orders (last 24 hrs)     Start     Ordered    03/09/17 1100  levoFLOXacin (LEVAQUIN) tablet 500 mg  Every 24 Hours      03/09/17 0912 03/09/17 0913  Diet Regular  Diet Effective Now      03/09/17 0912 03/09/17 0831  Vital signs every 5 minutes for 15 minutes, every 15 minutes thereafter.  Once,   Status:  Canceled      03/09/17 0830 03/09/17 0831  Continue OR fluids  Until Discontinued,   Status:  Canceled      03/09/17 0830 03/09/17 0831  Call Anesthesiologist for additional IV Fluid bolus for Hypotension/Tachycardia  Until Discontinued,   Status:  Canceled      03/09/17 0830 03/09/17 0831  Notify Anesthesia of Any Acute Changes in Patient Condition  Until Discontinued,   Status:  Canceled      03/09/17 0830     03/09/17 0831  Notify Anesthesia for Unrelieved Pain  Until Discontinued,   Status:  Canceled      03/09/17 0830 03/09/17 0831  Oxygen Therapy- Blow by - Humidified; Titrate for spO2: equal to or greater than, 96%, per policy  Continuous,   Status:  Canceled      03/09/17 0830 03/09/17 0831  Pulse Oximetry, Continuous  Continuous,   Status:  Canceled      03/09/17 0830 03/09/17 0831  Once DC criteria to floor met, follow surgeon's orders.  Until Discontinued,   Status:  Canceled      03/09/17 0830 03/09/17 0831  Discharge patient from PACU when discharge criteria is met.  Until Discontinued,   Status:  Canceled      03/09/17 0830 03/09/17 0830  oxyCODONE-acetaminophen (PERCOCET) 5-325 MG per tablet 1 tablet  Once As Needed,   Status:  Discontinued      03/09/17 0830 03/09/17 0830  FentaNYL Citrate (PF) (SUBLIMAZE) injection 50 mcg  Every 5 Minutes PRN,   Status:  Discontinued      03/09/17 0830 03/09/17 0830  ipratropium-albuterol (DUO-NEB) nebulizer solution 3 mL  Once As Needed,   Status:  Discontinued      03/09/17 0830 03/09/17 0830  ondansetron (ZOFRAN) injection 4 mg  Once As Needed,   Status:  Discontinued      03/09/17 0830 03/09/17 0830  meperidine (DEMEROL) injection 12.5 mg  Every 5 Minutes PRN,   Status:  Discontinued      03/09/17 0830 03/09/17 0805  sterile water irrigation solution  As Needed,   Status:  Discontinued      03/09/17 0805    03/09/17 0730  ceFAZolin (ANCEF) 1 g/100 mL 0.9% NS IVPB (mbp)  On Call to O.R.      03/09/17 0646    03/09/17 0730  lactated ringers infusion  Continuous      03/09/17 0651    03/09/17 0711  FentaNYL Citrate (PF) (SUBLIMAZE) injection 25 mcg  Every 5 Minutes PRN,   Status:  Discontinued      03/09/17 0711    03/09/17 0711  [facility]  1 Time Imaging      03/09/17 0711    03/09/17 0652  Vital signs per Anesthesia protocol.  Once,   Status:  Canceled      03/09/17 0651    03/09/17 0652  Oxygen Therapy- Nasal Cannula; Titrate for  spO2: equal to or greater than, 90%  Continuous,   Status:  Canceled      03/09/17 0651    03/09/17 0652  Insert Peripheral IV  Once,   Status:  Canceled      03/09/17 0651    03/09/17 0652  Saline Lock & Maintain IV Access  Continuous,   Status:  Canceled      03/09/17 0651    03/09/17 0651  Pulse Oximetry, Continuous  As Needed,   Status:  Canceled      03/09/17 0651    03/09/17 0651  sodium chloride 0.9 % flush 1-10 mL  As Needed,   Status:  Discontinued      03/09/17 0651    03/09/17 0641  Verify Informed Consent  Once      03/09/17 0640    03/09/17 0640  Inpatient Admission  Once      03/09/17 0639    03/09/17 0640  Follow Anesthesia Guidelines / Standing Orders  Once,   Status:  Canceled      03/09/17 0639    03/09/17 0600  CBC Auto Differential  PROCEDURE ONCE      03/09/17 0001    03/09/17 0454  Osmolality, Calculated  Once      03/09/17 0454    03/09/17 0001  NPO Diet  Diet Effective Midnight,   Status:  Canceled      03/08/17 1716    03/08/17 2100  sennosides-docusate sodium (SENOKOT-S) 8.6-50 MG tablet 2 tablet  Nightly      03/08/17 1639    03/08/17 2100  heparin (porcine) 5000 UNIT/ML injection 5,000 Units  Every 12 Hours Scheduled      03/08/17 1644    03/08/17 2100  tamsulosin (FLOMAX) 24 hr capsule 0.4 mg  Nightly      03/08/17 1649    03/08/17 2100  acetaminophen (TYLENOL) tablet 650 mg  Every 4 Hours PRN      03/08/17 2046    03/08/17 2000  Vital Signs  Every 4 Hours     Comments:  Per per hospital policy    03/08/17 1644    03/08/17 1800  ampicillin-sulbactam 3 g/100 mL 0.9% NS (MBP)  Every 12 Hours      03/08/17 1644    03/08/17 1800  levoFLOXacin (LEVAQUIN) tablet 250 mg  Every 24 Hours,   Status:  Discontinued      03/08/17 1649    03/08/17 1725  Osmolality, Calculated  Once      03/08/17 1724    03/08/17 1718  scuds pump  Once      03/08/17 1717    03/08/17 1715  Morphine sulfate PCA 1 mg/mL  Continuous      03/08/17 1639    03/08/17 1715  dextrose 5 % and sodium chloride 0.45 % infusion   Continuous      03/08/17 1644    03/08/17 1700  Strict Intake and Output  Every Hour      03/08/17 1644    03/08/17 1645  Basic Metabolic Panel  Daily      03/08/17 1644    03/08/17 1645  CBC & Differential  Daily      03/08/17 1644    03/08/17 1645  VTE Risk Assessment - Moderate Risk  Once      03/08/17 1644    03/08/17 1645  CBC Auto Differential  PROCEDURE ONCE      03/08/17 1644    03/08/17 1643  CT Abdomen Pelvis Stone Protocol  1 Time Imaging      03/08/17 1644    03/08/17 1643  calcium carbonate (TUMS) chewable tablet 500 mg (200 mg elemental)  2 Times Daily PRN      03/08/17 1644    03/08/17 1642  Diet Regular  Diet Effective Now,   Status:  Canceled      03/08/17 1644    03/08/17 1642  CBC (No Diff)  STAT,   Status:  Canceled      03/08/17 1644    03/08/17 1642  Comprehensive Metabolic Panel  STAT      03/08/17 1644    03/08/17 1641  Full Code  Continuous      03/08/17 1644    03/08/17 1641  Place Sequential Compression Device  Once      03/08/17 1644    03/08/17 1641  Maintain Sequential Compression Device  Continuous      03/08/17 1644    03/08/17 1640  Weigh patient  Once      03/08/17 1644    03/08/17 1640  Oxygen Therapy-  Continuous      03/08/17 1644    03/08/17 1640  Insert Peripheral IV  Once      03/08/17 1644    03/08/17 1640  Saline Lock & Maintain IV Access  Continuous,   Status:  Canceled      03/08/17 1644    03/08/17 1640  Inpatient Admission  Once      03/08/17 1644    03/08/17 1639  sodium chloride 0.9 % flush 1-10 mL  As Needed      03/08/17 1644    03/08/17 1638  promethazine (PHENERGAN) 12.5 mg in sodium chloride 0.9 % 50 mL  Every 6 Hours PRN      03/08/17 1639    03/08/17 1638  Assess Respiratory Rate, Pain Score & Sedation Level Using Pasero Opioid-Sedation Scale (POSS)  Per Hospital Policy     Comments:  Assess every 2 hours x 24 hours, then every 4 hours and PRN while receiving PCA.    03/08/17 1639    03/08/17 1638  Notify Provider for Inadequate Pain Control, Excessive Sedation  or Other Issues Regarding PCA Therapy  Until Discontinued      03/08/17 1639    03/08/17 1637  naloxone (NARCAN) injection 0.1 mg  Every 5 Minutes PRN      03/08/17 1639    03/08/17 1635  ondansetron (ZOFRAN) tablet 4 mg  Every 8 Hours PRN      03/08/17 1649    03/08/17 1635  oxyCODONE-acetaminophen (PERCOCET) 5-325 MG per tablet 1 tablet  Every 6 Hours PRN      03/08/17 1649    03/08/17 1631  iv pump and pca pump  Once      03/08/17 1631             Operative/Procedure Notes (last 24 hours) (Notes from 3/8/2017 10:05 AM through 3/9/2017 10:05 AM)      Arnulfo Serrano MD at 3/9/2017  8:19 AM  Version 1 of 1         CYSTOSCOPY URETERAL CATHETER/STENT INSERTION  Procedure Note    Ritesh Azevedo  3/8/2017 - 3/9/2017    Pre-op Diagnosis:   Incomplete bladder emptying [R33.9]  Ureteral stone [N20.1]  Urinary tract infection, site unspecified [N39.0]  Sepsis, due to unspecified organism [A41.9]    Post-op Diagnosis:     Same and stent placed    Procedure/CPT® Codes:      Procedure(s):  CYSTOSCOPY URETERAL CATHETER/STENT INSERTION  Op note job id [id number]    Surgeon(s):  Arnulfo Serrano MD    Anesthesia: General    Staff:   Circulator: Karolina Walden RN; Marce Ralph RN  Scrub Person: Heidy Vee; Leah Montgomery    Estimated Blood Loss: * No values recorded between 3/9/2017  7:48 AM and 3/9/2017  8:18 AM *  Urine Voided: * No values recorded between 3/9/2017  7:48 AM and 3/9/2017  8:18 AM *    Specimens:                * No specimens in log *      Drains:   Ureteral Catheter 03/09/17 0814 left ureter (Active)       Urethral Catheter 03/09/17 0815 latex  10 10 (Active)       [REMOVED] Urethral Catheter 03/08/17 1635 16 (Removed)   Removed 03/09/17 0800   Daily Indications Acute obstruction 3/8/2017  7:17 PM   Securement secured to upper leg with adhesive device 3/9/2017  5:03 AM   Foreskin circumcised 3/8/2017  7:17 PM   Catheter care done Yes 3/9/2017  5:03 AM   Tolerance no  signs/symptoms of discomfort 3/8/2017  7:17 PM   Intake (mL) 240 3/8/2017 11:45 PM   Urine Output (mL) 175 3/9/2017  3:07 AM       Findings: stent in proper position    Complications: sepsis      Arnulfo Serrano MD     Date: 3/9/2017  Time: 8:19 AM         Electronically signed by Arnulfo Serrano MD at 3/9/2017  8:23 AM           Nursing Assessments (last 24 hours)      Adult PCS Body System       03/08/17 1611 03/08/17 1630 03/08/17 1748 03/08/17 1758 03/08/17 1800    Pain/Comfort/Sleep    Presence Of Pain  complains of pain/discomfort denies pain/discomfort denies pain/discomfort     Preferred Pain Scale  number (Numeric Rating Pain Scale) number (Numeric Rating Pain Scale)      Pain Body Location  abdomen       Pain Frequency  constant       Pain Quality  cramping;pressure       Associated Signs/Symptoms  nausea       Pain Rating (0-10): Rest  5 3 2     (POSS) Pasero Opioid-Induced Sed Scale  1 - Awake and alert 1 - Awake and alert S - Sleep, easy to arouse     Additional Documentation   (POSS) Pasero Opioid-Induced Sed Scale (Row)      Coping/Psychosocial    Observed Emotional State   accepting;calm;cooperative      Plan Of Care Reviewed With   mother;patient      Coping Strategies    Supportive Measures   active listening utilized      Diversional Activities   television;smartphone      Family/Support System Care   support provided      Trust Relationship/Rapport   care explained;choices provided      Family/Support System    Family Member/Support Person(s)   mother      Involvement in Care   at bedside;attentive to patient      HEENT    HEENT WDL   WDL      Cognitive    Cognitive/Neuro/Behavioral WDL    WDL except;arousability;level of consciousness;mood/behavior;orientation;speech      Level of Consciousness   Alert      Arousal Level   opens eyes spontaneously      Orientation   oriented x 4      Speech   clear;spontaneous      Mood/Behavior   calm;cooperative      Neuro    Muscle Tone  Assessment   Bilateral Upper Extremities;Bilateral Lower Extremities      Bilateral Upper Extremities Muscle Tone Assessment   mildly decreased tone      Bilateral Lower Extremities Muscle Tone Assessment   mildly decreased tone      Behavioral    Additional Documentation   Behavior WDL (Group)      Behavior WDL    Behavior WDL   WDL      Respiratory    Respiratory WDL    WDL except;breath sounds;effort/expansion;mucous membranes;nailbeds;rhythm/pattern      Cough And Deep Breathing   done independently per patient      Rhythm/Pattern (Respiratory)   unlabored;pattern regular;depth regular;no shortness of breath reported      Expansion/Accessory Muscles/Retractions   no retractions;no use of accessory muscles      Nailbeds   no discoloration      Mucous Membranes   pink;intact;moist      Breath Sounds    Breath Sounds   All Fields      All Fields Breath Sounds   equal bilaterally;clear      Oxygen Therapy    O2 Device   room air      Cardiac    Cardiac WDL   WDL      Peripheral Neurovascular    Peripheral Neurovascular WDL    WDL except;capillary refill general      Capillary Refill   General      General Capillary Refill   other (see comments)   no VTE       Venous Thromboembolism Prevent/Manage   bilateral;sequential compression devices on      Neurovascular Assessment    All Extremities   General      All Extremities Temperature   warm      All Extremities Color   no discoloration      All Extremities Sensation   no tingling;no numbness      Gastrointestinal    GI WDL    WDL except;appearance/characteristics;bowel sounds      Abdominal Appearance   nondistended      Bowel Sounds   All Quadrants      All Quadrants Bowel Sounds   audible and active in all quadrants      Bowel Program    Bowel Intervention   ambulation promoted;adequate fluid intake promoted      Genitourinary    Genitourinary WDL    WDL except;voiding ability/characteristics      Voiding Characteristics   urethral catheter (bladder)      Skin     Skin WDL   WDL      Boston Risk Assessment (If Boston score </= 18, add the appropriate CPG to the care plan)    Sensory Perception   4-->no impairment      Moisture   4-->rarely moist      Activity   3-->walks occasionally      Mobility   3-->slightly limited      Nutrition   3-->adequate      Friction and Shear   3-->no apparent problem      Boston Score   20      Musculoskeletal    Musculoskeletal WDL   WDL      Functional Screen Current    Ambulation   0-->independent      Transferring   1-->assistive equipment      Toileting   0-->independent      Bathing   0-->independent      Dressing   0-->independent      Eating   0-->independent      Communication   0-->understands/communicates without difficulty      Swallowing (if score 2 or more for any item, consult Rehab Services)   0-->swallows foods/liquids without difficulty      Nutrition    Diet/Nutrition Prescription   regular      Diet/Feeding Assistance   none      Diet/Feeding Tolerance   fair      Peripheral IV Line - Single Lumen 03/08/17 1633 median cubital vein (antecubital fossa), left 20 gauge    Peripheral IV Line - Properties Group Placement Date: 03/08/17 Placement Time: 1633 Location: median cubital vein (antecubital fossa), left Device/Lot Number: over-the-needle catheter system Gauge/Length: 20 gauge Pain Prevention/Patient Tolerance: appears comfortable;tolerated well    Indication/Daily Review of Necessity   fluid therapy continuous;medication therapy continuous      Site Preparation/Maintenance   dressing: dry and intact      Securement sterile tape strips, secured with  catheter stabilization device, secured with      Patency/Maintenance flushed without difficulty  flushed without difficulty      Pump Type and Serial Number   infusion pump      Phlebitis 0-->no symptoms  0-->no symptoms      Infiltration 0-->no symptoms  0-->no symptoms      [REMOVED] Urethral Catheter 03/08/17 1635 16    Urethral Catheter - Properties Group Placement Date:  03/08/17 Placement Time: 1635 Indications: Acute retention Urethral Catheter Type: indwelling single lumen catheter Lumen Size (Fr): 16 Placement/Insertion: present on admission to this facility , inserted at Dr Salinas office  Drainage Method: leg bag to dependent drainage Removal Date: 03/09/17 Removal Time: 0800    Daily Indications   Acute obstruction      Securement   secured to upper leg with adhesive device      Tolerance   no signs/symptoms of discomfort      Sepsis Screening Tool    Previous screen positive?   no      Are 2 or > of the above criteria present?   no: STOP/negative screen      Safety    Safety WDL   WDL      Safety   ID band on;upper side rails raised x 2;call light in reach;wheels locked;bed in low position      [facility] High Risk Falls Assessment (If Fall score is >/=13, add the Fall Risk CPG to the care plan)     Fallen in past 6 months   0--> No      Mental Status   0--> no mental status change      Elimination   0--> No elimination issues      Mobility   2--> Requires assistance- transfer, walker, etc.;2--> New mobility issue      Medications   1--> Narcotics      Nurses' Clinical Judgement   10      Total Fall Risk Score   15      Safety Interventions    Safety Promotion/Fall Prevention   safety round/check completed  safety round/check completed    All Alarms   none present  none present    Medication Review/Management   medications reviewed      Environmental Safety Modification   assistive device/personal items within reach;clutter free environment maintained      Infection Prevention   barrier precautions utilized      Daily Care    Activity Type   activity adjusted per tolerance;activity encouraged;up ad michael      Activity Level of Assistance   assistance, 1 person      Positioning    Positioning   supine, head elevated      Head Of Bed (HOB) Position   HOB elevated        03/08/17 1917 03/08/17 2017 03/08/17 2040 03/08/17 2052 03/08/17 2200    Pain/Comfort/Sleep    Presence  Of Pain complains of pain/discomfort denies pain/discomfort  other (see comments)   medicated for fever     Preferred Pain Scale number (Numeric Rating Pain Scale) number (Numeric Rating Pain Scale)       Pain Body Location abdomen        Pain Quality cramping;pressure        Associated Signs/Symptoms    fever     Pain Rating (0-10): Rest  2       Pain Management Interventions medicated around-the-clock dosing utilized   morphine PCA pump       (POSS) Pasero Opioid-Induced Sed Scale     2 - Slightly drowsy, easily aroused    Fever Reduction/Comfort Measures    medication administered     Coping/Psychosocial    Observed Emotional State calm;cooperative        Plan Of Care Reviewed With patient        Coping Strategies    Supportive Measures active listening utilized        Diversional Activities television;smartphone        Family/Support System Care support provided        Trust Relationship/Rapport care explained;questions answered        Family/Support System    Family Member/Support Person(s) father        Involvement in Care at bedside;attentive to patient        HEENT    HEENT WDL WDL        Cognitive    Cognitive/Neuro/Behavioral WDL WDL        Cognitive Interventions    Sensory Stimulation Regulation care clustered        Neuro    Muscle Tone Assessment Bilateral Upper Extremities;Bilateral Lower Extremities        Bilateral Upper Extremities Muscle Tone Assessment --   WDL        Bilateral Lower Extremities Muscle Tone Assessment --   WDL        Behavior WDL    Behavior WDL WDL        Respiratory    Respiratory WDL WDL        Breath Sounds    Breath Sounds All Fields        All Fields Breath Sounds clear;equal bilaterally        Oxygen Therapy    O2 Device room air        Cardiac    Cardiac WDL WDL        Peripheral Neurovascular    Peripheral Neurovascular WDL WDL        Capillary Refill General        General Capillary Refill less than/equal to 3 secs        Venous Thromboembolism Prevent/Manage  bilateral;sequential compression devices on;anticoagulant therapy initiated   heparin for VTE        Additional Documentation Dorsalis Pedis Pulse (Group);Radial Pulse (Group)        Neurovascular Assessment    All Extremities General        All Extremities Temperature warm        All Extremities Color no discoloration        All Extremities Sensation no numbness;no tingling        Radial Pulse    Left Radial Pulse 2+ (normal)        Right Radial Pulse 2+ (normal)        Dorsalis Pedis Pulse    Left Dorsalis Pedis Pulse 2+ (normal)        Right Dorsalis Pedis Pulse 2+ (normal)        Gastrointestinal    GI WDL  WDL except;palpation;bowel sounds        Abdominal Appearance nondistended        Palpation All Quadrants        All Quadrants Palpation tender        Bowel Sounds All Quadrants        All Quadrants Bowel Sounds audible and active in all quadrants        Genitourinary    Genitourinary WDL  WDL except;urine;voiding ability/characteristics        Voiding Characteristics urethral catheter (bladder)        Urine Characteristics clear yellow        Skin    Skin WDL WDL        Boston Risk Assessment (If [place] score </= 18, add the appropriate CPG to the care plan)    Sensory Perception 4-->no impairment        Moisture 4-->rarely moist        Activity 4-->walks frequently        Mobility 4-->no limitation        Nutrition 3-->adequate        Friction and Shear 3-->no apparent problem        Boston Score 22        Musculoskeletal    Musculoskeletal WDL WDL        Functional Screen Current    Ambulation 0-->independent        Transferring 0-->independent        Toileting 0-->independent        Bathing 0-->independent        Dressing 0-->independent        Eating 0-->independent        Communication 0-->understands/communicates without difficulty        Swallowing (if score 2 or more for any item, consult Rehab Services) 0-->swallows foods/liquids without difficulty        Nutrition    Diet/Nutrition Prescription  regular   NPO after midnight        Peripheral IV Line - Single Lumen 03/08/17 1633 median cubital vein (antecubital fossa), left 20 gauge    Peripheral IV Line - Properties Group Placement Date: 03/08/17 Placement Time: 1633 Location: median cubital vein (antecubital fossa), left Device/Lot Number: over-the-needle catheter system Gauge/Length: 20 gauge Pain Prevention/Patient Tolerance: appears comfortable;tolerated well    Indication/Daily Review of Necessity fluid therapy continuous;medication therapy continuous   morphine PCA        Site Preparation/Maintenance dressing: dry and intact        Securement site guard in place        Patency/Maintenance flushed without difficulty        Phlebitis 0-->no symptoms        Infiltration 0-->no symptoms        [REMOVED] Urethral Catheter 03/08/17 1635 16    Urethral Catheter - Properties Group Placement Date: 03/08/17 Placement Time: 1635 Indications: Acute retention Urethral Catheter Type: indwelling single lumen catheter Lumen Size (Fr): 16 Placement/Insertion: present on admission to this facility , inserted at Dr Salinas office  Drainage Method: leg bag to dependent drainage Removal Date: 03/09/17 Removal Time: 0800    Daily Indications Acute obstruction        Securement secured to upper leg with adhesive device        Foreskin circumcised        Catheter care done Yes   [email or web address]        Tolerance no signs/symptoms of discomfort        Sepsis Screening Tool    Previous screen positive? no        1. Criteria Present WBC > 39397 or < 4000        Are 2 or > of the above criteria present? no: STOP/negative screen        Safety    Safety WDL [facility] High Risk Falls Assessment (If Fall score is >/=13, add the Fall Risk CPG to the care plan)     Fallen in past 6 months 0--> No        Mental Status 0--> no mental status change        Elimination 0--> No elimination issues        Mobility 0--> No mobility issues        Medications 1--> Narcotics         Nurses' Clinical Judgement 6        Total Fall Risk Score 7        Safety Interventions    Safety Promotion/Fall Prevention safety round/check completed    safety round/check completed    All Alarms none present        Medication Review/Management medications reviewed        Environmental Safety Modification assistive device/personal items within reach        Provider Notification    Reason for Communication   Other (Comment)   fever of 101.0      Provider Name   Dr. Garcia      Notification Route   Phone call      Response   See orders        03/09/17 0000 03/09/17 0122 03/09/17 0222 03/09/17 0400 03/09/17 0544    Pain/Comfort/Sleep    Presence Of Pain  complains of pain/discomfort denies pain/discomfort  other (see comments)   fever    Preferred Pain Scale  number (Numeric Rating Pain Scale) number (Numeric Rating Pain Scale)      Pain Body Location  abdomen       Pain Quality  cramping;pressure       Pain Rating (0-10): Rest   0      Pain Management Interventions  medicated       (POSS) Pasero Opioid-Induced Sed Scale 2 - Slightly drowsy, easily aroused   2 - Slightly drowsy, easily aroused     Fever Reduction/Comfort Measures     medication administered    Peripheral IV Line - Single Lumen 03/08/17 1633 median cubital vein (antecubital fossa), left 20 gauge    Peripheral IV Line - Properties Group Placement Date: 03/08/17 Placement Time: 1633 Location: median cubital vein (antecubital fossa), left Device/Lot Number: over-the-needle catheter system Gauge/Length: 20 gauge Pain Prevention/Patient Tolerance: appears comfortable;tolerated well    [REMOVED] Urethral Catheter 03/08/17 1635 16    Urethral Catheter - Properties Group Placement Date: 03/08/17 Placement Time: 1635 Indications: Acute retention Urethral Catheter Type: indwelling single lumen catheter Lumen Size (Fr): 16 Placement/Insertion: present on admission to this facility , inserted at Dr Salinas office  Drainage Method: leg bag to dependent drainage  Removal Date: 03/09/17 Removal Time: 0800    Safety Interventions    Safety Promotion/Fall Prevention safety round/check completed   safety round/check completed       03/09/17 0600                Pain/Comfort/Sleep    (POSS) Pasero Opioid-Induced Sed Scale 2 - Slightly drowsy, easily aroused        Incision 03/09/17 0818 Left perineum    Incision - Properties Group Placement Date: 03/09/17 Placement Time: 0818 Side: Left Location: perineum    Peripheral IV Line - Single Lumen 03/08/17 1633 median cubital vein (antecubital fossa), left 20 gauge    Peripheral IV Line - Properties Group Placement Date: 03/08/17 Placement Time: 1633 Location: median cubital vein (antecubital fossa), left Device/Lot Number: over-the-needle catheter system Gauge/Length: 20 gauge Pain Prevention/Patient Tolerance: appears comfortable;tolerated well    [REMOVED] Urethral Catheter 03/08/17 1635 16    Urethral Catheter - Properties Group Placement Date: 03/08/17 Placement Time: 1635 Indications: Acute retention Urethral Catheter Type: indwelling single lumen catheter Lumen Size (Fr): 16 Placement/Insertion: present on admission to this facility , inserted at Dr Salinas office  Drainage Method: leg bag to dependent drainage Removal Date: 03/09/17 Removal Time: 0800    Urethral Catheter 03/09/17 0815 latex  10 10    Urethral Catheter - Properties Group Placement Date: 03/09/17 Placement Time: 0815 Indications: Selected surgeries ( tract, abdomen) Urethral Catheter Type: indwelling single lumen catheter Material: latex Placement/Insertion: inserted at this facility Number Of Insertion Attempts: 0 Balloon Size (mL): 10 Balloon Inflation Volume (mL): 10 Cleansed with: Other (comment) , betadine  Drainage Method: drainage bag to dependent drainage    Ureteral Catheter 03/09/17 0814 left ureter    Ureteral Catheter - Properties Group Placement Date: 03/09/17 Placement Time: 0814 Placement/Insertion: inserted at this facility Location: left  ureter    Safety Interventions    Safety Promotion/Fall Prevention safety round/check completed

## 2017-03-09 NOTE — BRIEF OP NOTE
CYSTOSCOPY URETERAL CATHETER/STENT INSERTION  Procedure Note    Ritesh Azevedo  3/8/2017 - 3/9/2017    Pre-op Diagnosis:   Incomplete bladder emptying [R33.9]  Ureteral stone [N20.1]  Urinary tract infection, site unspecified [N39.0]  Sepsis, due to unspecified organism [A41.9]    Post-op Diagnosis:     Same and stent placed    Procedure/CPT® Codes:      Procedure(s):  CYSTOSCOPY URETERAL CATHETER/STENT INSERTION  Op note job id [id number]    Surgeon(s):  Arnulfo Serrano MD    Anesthesia: General    Staff:   Circulator: Karolina Walden RN; Marce Ralph RN  Scrub Person: Heidy Vee; Leah Montgomery    Estimated Blood Loss: * No values recorded between 3/9/2017  7:48 AM and 3/9/2017  8:18 AM *  Urine Voided: * No values recorded between 3/9/2017  7:48 AM and 3/9/2017  8:18 AM *    Specimens:                * No specimens in log *      Drains:   Ureteral Catheter 03/09/17 0814 left ureter (Active)       Urethral Catheter 03/09/17 0815 latex  10 10 (Active)       [REMOVED] Urethral Catheter 03/08/17 1635 16 (Removed)   Removed 03/09/17 0800   Daily Indications Acute obstruction 3/8/2017  7:17 PM   Securement secured to upper leg with adhesive device 3/9/2017  5:03 AM   Foreskin circumcised 3/8/2017  7:17 PM   Catheter care done Yes 3/9/2017  5:03 AM   Tolerance no signs/symptoms of discomfort 3/8/2017  7:17 PM   Intake (mL) 240 3/8/2017 11:45 PM   Urine Output (mL) 175 3/9/2017  3:07 AM       Findings: stent in proper position    Complications: sepsis      Arnulfo Serrano MD     Date: 3/9/2017  Time: 8:19 AM

## 2017-03-10 VITALS
TEMPERATURE: 98 F | SYSTOLIC BLOOD PRESSURE: 140 MMHG | DIASTOLIC BLOOD PRESSURE: 87 MMHG | WEIGHT: 221 LBS | HEIGHT: 72 IN | OXYGEN SATURATION: 95 % | RESPIRATION RATE: 16 BRPM | HEART RATE: 80 BPM | BODY MASS INDEX: 29.93 KG/M2

## 2017-03-10 LAB
ANION GAP SERPL CALCULATED.3IONS-SCNC: 5.4 MMOL/L (ref 3.6–11.2)
BACTERIA SPEC AEROBE CULT: NO GROWTH
BASOPHILS # BLD AUTO: 0.02 10*3/MM3 (ref 0–0.3)
BASOPHILS NFR BLD AUTO: 0.1 % (ref 0–2)
BUN BLD-MCNC: 12 MG/DL (ref 7–21)
BUN/CREAT SERPL: 14.3 (ref 7–25)
CA PHOS CRY STONE QL IR: 15 %
CALCIUM SPEC-SCNC: 9.7 MG/DL (ref 7.7–10)
CHLORIDE SERPL-SCNC: 104 MMOL/L (ref 99–112)
CO2 SERPL-SCNC: 27.6 MMOL/L (ref 24.3–31.9)
COD CRY STONE QL IR: 75 %
COLOR STONE: NORMAL
COM CRY STONE QL IR: 10 %
COMPN STONE: NORMAL
CREAT BLD-MCNC: 0.84 MG/DL (ref 0.43–1.29)
DEPRECATED RDW RBC AUTO: 42.9 FL (ref 37–54)
EOSINOPHIL # BLD AUTO: 0.01 10*3/MM3 (ref 0–0.7)
EOSINOPHIL NFR BLD AUTO: 0.1 % (ref 0–5)
ERYTHROCYTE [DISTWIDTH] IN BLOOD BY AUTOMATED COUNT: 12.9 % (ref 11.5–14.5)
GFR SERPL CREATININE-BSD FRML MDRD: 106 ML/MIN/1.73
GLUCOSE BLD-MCNC: 131 MG/DL (ref 70–110)
HCT VFR BLD AUTO: 36.8 % (ref 42–52)
HGB BLD-MCNC: 11.9 G/DL (ref 14–18)
IMM GRANULOCYTES # BLD: 0.06 10*3/MM3 (ref 0–0.03)
IMM GRANULOCYTES NFR BLD: 0.4 % (ref 0–0.5)
LYMPHOCYTES # BLD AUTO: 1.13 10*3/MM3 (ref 1–3)
LYMPHOCYTES NFR BLD AUTO: 7.9 % (ref 21–51)
Lab: NORMAL
Lab: NORMAL
MCH RBC QN AUTO: 30.3 PG (ref 27–33)
MCHC RBC AUTO-ENTMCNC: 32.3 G/DL (ref 33–37)
MCV RBC AUTO: 93.6 FL (ref 80–94)
MONOCYTES # BLD AUTO: 1.18 10*3/MM3 (ref 0.1–0.9)
MONOCYTES NFR BLD AUTO: 8.2 % (ref 0–10)
NEUTROPHILS # BLD AUTO: 11.94 10*3/MM3 (ref 1.4–6.5)
NEUTROPHILS NFR BLD AUTO: 83.3 % (ref 30–70)
NIDUS STONE QL: NORMAL
OSMOLALITY SERPL CALC.SUM OF ELEC: 275.4 MOSM/KG (ref 273–305)
PATH REPORT.COMMENTS IMP SPEC: NORMAL
PLATELET # BLD AUTO: 252 10*3/MM3 (ref 130–400)
PMV BLD AUTO: 11.2 FL (ref 6–10)
POTASSIUM BLD-SCNC: 4.2 MMOL/L (ref 3.5–5.3)
RBC # BLD AUTO: 3.93 10*6/MM3 (ref 4.7–6.1)
SIZE STONE: NORMAL MM
SODIUM BLD-SCNC: 137 MMOL/L (ref 135–153)
WBC NRBC COR # BLD: 14.34 10*3/MM3 (ref 4.5–12.5)
WT STONE: 8 MG

## 2017-03-10 PROCEDURE — 99238 HOSP IP/OBS DSCHRG MGMT 30/<: CPT | Performed by: UROLOGY

## 2017-03-10 PROCEDURE — 94799 UNLISTED PULMONARY SVC/PX: CPT

## 2017-03-10 PROCEDURE — 85025 COMPLETE CBC W/AUTO DIFF WBC: CPT | Performed by: UROLOGY

## 2017-03-10 PROCEDURE — 80048 BASIC METABOLIC PNL TOTAL CA: CPT | Performed by: UROLOGY

## 2017-03-10 RX ORDER — OXYCODONE HYDROCHLORIDE AND ACETAMINOPHEN 5; 325 MG/1; MG/1
2 TABLET ORAL EVERY 6 HOURS PRN
Status: ACTIVE | OUTPATIENT
Start: 2017-03-10

## 2017-03-10 RX ORDER — LEVOFLOXACIN 5 MG/ML
500 INJECTION, SOLUTION INTRAVENOUS EVERY 24 HOURS
Status: ACTIVE | OUTPATIENT
Start: 2017-03-10 | End: 2017-03-30

## 2017-03-10 RX ORDER — OXYCODONE HYDROCHLORIDE AND ACETAMINOPHEN 5; 325 MG/1; MG/1
1-2 TABLET ORAL EVERY 6 HOURS PRN
Qty: 40 TABLET | Refills: 0 | Status: SHIPPED | OUTPATIENT
Start: 2017-03-10 | End: 2017-11-22 | Stop reason: SDUPTHER

## 2017-03-10 RX ORDER — LEVOFLOXACIN 500 MG/1
500 TABLET, FILM COATED ORAL DAILY
Qty: 20 TABLET | Refills: 0 | Status: SHIPPED | OUTPATIENT
Start: 2017-03-10 | End: 2017-03-17 | Stop reason: SDUPTHER

## 2017-03-10 RX ADMIN — Medication 2 TABLET: at 00:26

## 2017-03-10 RX ADMIN — AMPICILLIN SODIUM AND SULBACTAM SODIUM 3 G: 2; 1 INJECTION, POWDER, FOR SOLUTION INTRAMUSCULAR; INTRAVENOUS at 05:23

## 2017-03-10 RX ADMIN — OXYCODONE HYDROCHLORIDE AND ACETAMINOPHEN 1 TABLET: 5; 325 TABLET ORAL at 10:30

## 2017-03-10 NOTE — DISCHARGE SUMMARY
Pt admitted with sepsis and had cystoscopy and left stent placement.  This improved his renal function and how he felt.  He is discharged on oral antibiotics and has not had a fever since the stent.  We will plan on leaving the stent in 2 to 3 weeks.  Will check a KUB at that time and will see him in the office either to do cystoscopy and stent removal or if indicated repeating ureteroscopy both rigid and flexible to go after stone fragments.  Pt is comfortable with both of these plans

## 2017-03-10 NOTE — PLAN OF CARE
Problem: Patient Care Overview (Adult)  Goal: Plan of Care Review  Outcome: Ongoing (interventions implemented as appropriate)    03/10/17 0401   Coping/Psychosocial Response Interventions   Plan Of Care Reviewed With patient   Patient Care Overview   Progress progress toward functional goals as expected         03/10/17 0401   Coping/Psychosocial Response Interventions   Plan Of Care Reviewed With patient   Patient Care Overview   Progress progress toward functi       03/10/17 0401   Coping/Psychosocial Response Interventions   Plan Of Care Reviewed With patient   Patient Care Overview   Progress progress toward functional goals as expected   onal goals as expected         Problem: Pain, Acute (Adult)  Goal: Identify Related Risk Factors and Signs and Symptoms  Outcome: Ongoing (interventions implemented as appropriate)    03/10/17 0401   Pain, Acute   Related Risk Factors (Acute Pain) patient perception;surgery   Signs and Symptoms (Acute Pain) verbalization of pain descriptors       Goal: Acceptable Pain Control/Comfort Level  Outcome: Ongoing (interventions implemented as appropriate)    03/10/17 0401   Pain, Acute (Adult)   Acceptable Pain Control/Comfort Level making progress toward outcome

## 2017-03-11 NOTE — PAYOR COMM NOTE
"[facility]   ODETTE ENRIQUEZ  PHONE  [phone]  FAX  [phone]    Ariel Shaffer (32 y.o. Male)     Date of Birth Social Security Number Address Home Phone MRN    1985  [address]  [place] [address] [phone] [id number]    Latter-day Marital Status          None Single       Admission Date Admission Type Admitting Provider Attending Provider Department, Room/Bed    3/8/17 Elective Arnulfo Serrano MD  [facility] 2 SOUTH, 212/2S    Discharge Date Discharge Disposition Discharge Destination        3/10/2017 Home or Self Care             Attending Provider: (none)    Allergies:  No Known Allergies    Isolation:  None   Infection:  None   Code Status:  Prior    Ht:  72\" (182.9 cm)   Wt:  221 lb (100 kg)    Admission Cmt:  None   Principal Problem:  None                Active Insurance as of 3/8/2017     Primary Coverage     Payor Plan Insurance Group Employer/Plan Group    [employer] BLUE CROSS [employer] [facility] BLUE [facility] [id number]     Payor Plan Address Payor Plan Phone Number Effective From Effective To    [address] [phone] 1/1/2014     [place] [address]       Subscriber Name Subscriber Birth Date Member ID       ARIEL SHAFFER 1985 [id number]                 Emergency Contacts      (Rel.) Home Phone Work Phone Mobile Phone    Sammi Polk (Significant Other) [phone] -- --              "

## 2017-03-17 DIAGNOSIS — N39.0 SEPSIS DUE TO URINARY TRACT INFECTION (HCC): ICD-10-CM

## 2017-03-17 DIAGNOSIS — A41.9 SEPSIS DUE TO URINARY TRACT INFECTION (HCC): ICD-10-CM

## 2017-03-17 RX ORDER — LEVOFLOXACIN 500 MG/1
500 TABLET, FILM COATED ORAL DAILY
Qty: 20 TABLET | Refills: 0 | Status: SHIPPED | OUTPATIENT
Start: 2017-03-17 | End: 2017-03-27

## 2017-03-24 ENCOUNTER — HOSPITAL ENCOUNTER (OUTPATIENT)
Dept: GENERAL RADIOLOGY | Facility: HOSPITAL | Age: 32
Discharge: HOME | End: 2017-03-24
Attending: UROLOGY | Admitting: UROLOGY

## 2017-03-24 ENCOUNTER — OFFICE VISIT (OUTPATIENT)
Dept: UROLOGY | Facility: CLINIC | Age: 32
End: 2017-03-24

## 2017-03-24 DIAGNOSIS — N39.0 SEPSIS DUE TO URINARY TRACT INFECTION (HCC): ICD-10-CM

## 2017-03-24 DIAGNOSIS — N20.1 URETERAL STONE: ICD-10-CM

## 2017-03-24 DIAGNOSIS — A41.9 SEPSIS DUE TO URINARY TRACT INFECTION (HCC): ICD-10-CM

## 2017-03-24 PROCEDURE — 74000 XR ABDOMEN KUB: CPT | Performed by: RADIOLOGY

## 2017-03-24 PROCEDURE — 52310 CYSTOSCOPY AND TREATMENT: CPT | Performed by: UROLOGY

## 2017-03-24 PROCEDURE — 74000 HC ABDOMEN KUB: CPT

## 2017-03-24 RX ORDER — HYDROCODONE BITARTRATE AND ACETAMINOPHEN 5; 325 MG/1; MG/1
TABLET ORAL
Qty: 20 TABLET | Refills: 0 | Status: SHIPPED | OUTPATIENT
Start: 2017-03-24 | End: 2017-11-22 | Stop reason: ALTCHOICE

## 2017-03-24 NOTE — PROGRESS NOTES
Chief Complaint:       Chief Complaint   Patient presents with   • Nephrolithiasis           HPI:       HPI  Pt has has his stent in 3 weeks and is still having discomfort.      PMI:      Past Medical History:   Diagnosis Date   • Arthritis     spine   • Hypertension      no medicine   • Kidney stone            Medications:        Current Outpatient Prescriptions:   •  levoFLOXacin (LEVAQUIN) 500 MG tablet, Take 1 tablet by mouth Daily for 10 days., Disp: 20 tablet, Rfl: 0  •  ondansetron (ZOFRAN) 4 MG tablet, Take 1 tablet by mouth Every 8 (Eight) Hours As Needed for nausea or vomiting., Disp: 30 tablet, Rfl: 0  •  oxyCODONE-acetaminophen (PERCOCET) 5-325 MG per tablet, 1 to 2 Tablets Every 6 Hours as needed for PAIN (Patient taking differently: 1-2 tablets Every 6 (Six) Hours As Needed for severe pain (7-10).), Disp: 40 tablet, Rfl: 0  •  oxyCODONE-acetaminophen (PERCOCET) 5-325 MG per tablet, Take 1-2 tablets by mouth Every 6 (Six) Hours As Needed for severe pain (7-10)., Disp: 40 tablet, Rfl: 0  •  tamsulosin (FLOMAX) 0.4 MG capsule 24 hr capsule, Take 1 capsule by mouth Daily. (Patient taking differently: Take 1 capsule by mouth Every Night.), Disp: 30 capsule, Rfl: 0  No current facility-administered medications for this visit.     Facility-Administered Medications Ordered in Other Visits:   •  levoFLOXacin (LEVAQUIN) 500 mg/100 mL D5W (premix) (LEVAQUIN) 500 mg, 500 mg, Intravenous, Q24H, Arnulfo Serrano MD  •  oxyCODONE-acetaminophen (PERCOCET) 5-325 MG per tablet 2 tablet, 2 tablet, Oral, Q6H PRN, Arnulfo Serrano MD        Allergies:      No Known Allergies        Past Surgical Histroy:      Past Surgical History:   Procedure Laterality Date   • CYSTOSCOPY URETEROSCOPY LASER LITHOTRIPSY Left 3/6/2017    Procedure: CYSTOSCOPY LEFT URETEROSCOPY HOLMIUM LASER LITHOTRIPSY POSSIBLE STENT PLACEMENT;  Surgeon: Arnulfo Serrano MD;  Location: [facility];  Service:    • CYSTOSCOPY W/ URETERAL  STENT PLACEMENT Left 3/9/2017    Procedure: CYSTOSCOPY URETERAL CATHETER/STENT INSERTION;  Surgeon: Arnulfo Serrano MD;  Location: [facility] OR;  Service:    • TONSILLECTOMY     • TONSILLECTOMY             Social History:      Social History     Social History   • Marital status: Single     Spouse name: N/A   • Number of children: N/A   • Years of education: N/A     Occupational History   • Not on file.     Social History Main Topics   • Smoking status: Former Smoker     Packs/day: 0.50     Years: 3.00     Types: Cigarettes     Quit date: 4/6/2016   • Smokeless tobacco: Former User      Comment: quit  04/2016   • Alcohol use No   • Drug use: No   • Sexual activity: Defer     Other Topics Concern   • Not on file     Social History Narrative           Family History:      Family History   Problem Relation Age of Onset   • Hypertension Father              Physical Exam:      Physical Exam        Procedure:      Procedure: Cystoscopy Male    Indication: stent    Urinalysis Performed Today:  Negative for Infection    Premedication:none    Informed Consent Obtained    Sterile prep performed in usual fashion    11 cc of topical lidocaine inserted urethrally for 10 minutes    Flexible cystoscope inserted and bladder examined    Findings: abnormal left stent    Additional Procedure with Cystoscopy: Removal of Stent  left    Discussion:      Will give him some norco for pain and septra.  Will see pt back in 6 weeks.  Counseling was given to patient for the following topics diagnostic results. and the interim medical history and current results were reviewed.  A treatment plan with follow-up was made. Total time of the encounter was 11 minutes and 11 minutes were spent discussing No primary diagnosis found. face-to-face.    This document has been electronically signed by Arnulfo Serrano MD March 24, 2017 2:11 PM  .

## 2017-03-24 NOTE — PROGRESS NOTES
Chief Complaint:          Chief Complaint   Patient presents with   • Nephrolithiasis       HPI:   32 y.o. male.    HPI        Past Medical History:        Past Medical History:   Diagnosis Date   • Arthritis     spine   • Hypertension      no medicine   • Kidney stone          Current Meds:     Current Outpatient Prescriptions   Medication Sig Dispense Refill   • levoFLOXacin (LEVAQUIN) 500 MG tablet Take 1 tablet by mouth Daily for 10 days. 20 tablet 0   • ondansetron (ZOFRAN) 4 MG tablet Take 1 tablet by mouth Every 8 (Eight) Hours As Needed for nausea or vomiting. 30 tablet 0   • oxyCODONE-acetaminophen (PERCOCET) 5-325 MG per tablet 1 to 2 Tablets Every 6 Hours as needed for PAIN (Patient taking differently: 1-2 tablets Every 6 (Six) Hours As Needed for severe pain (7-10).) 40 tablet 0   • oxyCODONE-acetaminophen (PERCOCET) 5-325 MG per tablet Take 1-2 tablets by mouth Every 6 (Six) Hours As Needed for severe pain (7-10). 40 tablet 0   • tamsulosin (FLOMAX) 0.4 MG capsule 24 hr capsule Take 1 capsule by mouth Daily. (Patient taking differently: Take 1 capsule by mouth Every Night.) 30 capsule 0     No current facility-administered medications for this visit.      Facility-Administered Medications Ordered in Other Visits   Medication Dose Route Frequency Provider Last Rate Last Dose   • levoFLOXacin (LEVAQUIN) 500 mg/100 mL D5W (premix) (LEVAQUIN) 500 mg  500 mg Intravenous Q24H Arnulfo Serrano MD       • oxyCODONE-acetaminophen (PERCOCET) 5-325 MG per tablet 2 tablet  2 tablet Oral Q6H PRN Arnulfo Serrano MD            Allergies:      No Known Allergies     Past Surgical History:     Past Surgical History:   Procedure Laterality Date   • CYSTOSCOPY URETEROSCOPY LASER LITHOTRIPSY Left 3/6/2017    Procedure: CYSTOSCOPY LEFT URETEROSCOPY HOLMIUM LASER LITHOTRIPSY POSSIBLE STENT PLACEMENT;  Surgeon: Arnulfo Serrano MD;  Location: [facility];  Service:    • CYSTOSCOPY W/ URETERAL STENT PLACEMENT  Left 3/9/2017    Procedure: CYSTOSCOPY URETERAL CATHETER/STENT INSERTION;  Surgeon: Arnulfo Serrano MD;  Location: [facility] OR;  Service:    • TONSILLECTOMY     • TONSILLECTOMY           Social History:     Social History     Social History   • Marital status: Single     Spouse name: N/A   • Number of children: N/A   • Years of education: N/A     Occupational History   • Not on file.     Social History Main Topics   • Smoking status: Former Smoker     Packs/day: 0.50     Years: 3.00     Types: Cigarettes     Quit date: 4/6/2016   • Smokeless tobacco: Former User      Comment: quit  04/2016   • Alcohol use No   • Drug use: No   • Sexual activity: Defer     Other Topics Concern   • Not on file     Social History Narrative       Family History:     Family History   Problem Relation Age of Onset   • Hypertension Father        Review of Systems:     Review of Systems    Physical Exam:     Physical Exam    Procedure:     No notes on file      Assessment:   No diagnosis found.  No orders of the defined types were placed in this encounter.      Plan:   ***    Counseling was given to {person:[id number]} for the following topics {topic:67288}. and the interim medical history and current results were reviewed.  A treatment plan with follow-up was made. Total time of the encounter was *** minutes and *** minutes were spent discussing No primary diagnosis found. face-to-face.        This document has been electronically signed by Arnulfo Serrano MD March 24, 2017 1:49 PM

## 2017-03-29 ENCOUNTER — DOCUMENTATION (OUTPATIENT)
Dept: UROLOGY | Facility: CLINIC | Age: 32
End: 2017-03-29

## 2017-03-29 NOTE — PROGRESS NOTES
I have attempted to call patient to inform him that his short term disability papers were completed and I faxed them to [employer], I have been unable to reach the patient on the numbers listed in his medical record.

## 2017-03-30 ENCOUNTER — DOCUMENTATION (OUTPATIENT)
Dept: UROLOGY | Facility: CLINIC | Age: 32
End: 2017-03-30

## 2017-03-30 NOTE — PROGRESS NOTES
PA for Levaquin was submitted on 03/29/17, awaiting an answer from the insurance company, I tried to contact the patient to let him know that his insurance company hasn't responded yet but was unable to contact him.

## 2017-04-03 ENCOUNTER — DOCUMENTATION (OUTPATIENT)
Dept: UROLOGY | Facility: CLINIC | Age: 32
End: 2017-04-03

## 2017-04-05 ENCOUNTER — OFFICE VISIT (OUTPATIENT)
Dept: ORTHOPEDIC SURGERY | Facility: CLINIC | Age: 32
End: 2017-04-05

## 2017-04-05 VITALS
DIASTOLIC BLOOD PRESSURE: 97 MMHG | HEART RATE: 74 BPM | BODY MASS INDEX: 30.94 KG/M2 | SYSTOLIC BLOOD PRESSURE: 144 MMHG | HEIGHT: 71 IN | WEIGHT: 221 LBS

## 2017-04-05 DIAGNOSIS — M19.012 OSTEOARTHRITIS OF BILATERAL GLENOHUMERAL JOINTS: Primary | ICD-10-CM

## 2017-04-05 DIAGNOSIS — M19.011 OSTEOARTHRITIS OF BILATERAL GLENOHUMERAL JOINTS: Primary | ICD-10-CM

## 2017-04-05 PROCEDURE — 99203 OFFICE O/P NEW LOW 30 MIN: CPT | Performed by: ORTHOPAEDIC SURGERY

## 2017-04-05 RX ORDER — CELECOXIB 200 MG/1
200 CAPSULE ORAL DAILY
Qty: 90 CAPSULE | Refills: 3 | Status: SHIPPED | OUTPATIENT
Start: 2017-04-05 | End: 2017-11-22

## 2017-04-05 NOTE — PROGRESS NOTES
New Patient Visit      Patient: Ritesh Azevedo  YOB: 1985  Date of Encounter: 04/05/2017          History of Present Illness:     32 year old white male seen today secondary to several year history bilateral shoulder pain absent of injury. Gradual onset. 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 Gradual stiffness and soreness upon overhead range of motion. He reports a popping sensation of the right shoulder. This causes difficulty with his ADL's at times. He has tried to take OTC NSAID's without relief. Denies any paraesthesias.      Patient Active Problem List   Diagnosis   • Sepsis due to urinary tract infection   • Incomplete bladder emptying     Past Medical History:   Diagnosis Date   • Arthritis     spine   • Hypertension      no medicine   • Kidney stone      Past Surgical History:   Procedure Laterality Date   • CYSTOSCOPY URETEROSCOPY LASER LITHOTRIPSY Left 3/6/2017    Procedure: CYSTOSCOPY LEFT URETEROSCOPY HOLMIUM LASER LITHOTRIPSY POSSIBLE STENT PLACEMENT;  Surgeon: Arnulfo Serrano MD;  Location: [facility];  Service:    • CYSTOSCOPY W/ URETERAL STENT PLACEMENT Left 3/9/2017    Procedure: CYSTOSCOPY URETERAL CATHETER/STENT INSERTION;  Surgeon: Arnulfo Serrano MD;  Location: [facility];  Service:    • TONSILLECTOMY     • TONSILLECTOMY       Social History     Occupational History   • Not on file.     Social History Main Topics   • Smoking status: Former Smoker     Packs/day: 0.50     Years: 3.00     Types: Cigarettes     Quit date: 4/6/2016   • Smokeless tobacco: Former User      Comment: quit  04/2016   • Alcohol use No   • Drug use: No   • Sexual activity: Defer      Social History     Social History Narrative     Family History   Problem Relation Age of Onset   • Hypertension Father      Current Outpatient Prescriptions   Medication Sig Dispense Refill   • HYDROcodone-acetaminophen (NORCO) 5-325 MG per tablet 1 to 2 Tablets Every 6 Hours as Needed for PAIN 20 tablet 0  "  • ondansetron (ZOFRAN) 4 MG tablet Take 1 tablet by mouth Every 8 (Eight) Hours As Needed for nausea or vomiting. 30 tablet 0   • oxyCODONE-acetaminophen (PERCOCET) 5-325 MG per tablet 1 to 2 Tablets Every 6 Hours as needed for PAIN (Patient taking differently: 1-2 tablets Every 6 (Six) Hours As Needed for severe pain (7-10).) 40 tablet 0   • oxyCODONE-acetaminophen (PERCOCET) 5-325 MG per tablet Take 1-2 tablets by mouth Every 6 (Six) Hours As Needed for severe pain (7-10). 40 tablet 0   • tamsulosin (FLOMAX) 0.4 MG capsule 24 hr capsule Take 1 capsule by mouth Daily. (Patient taking differently: Take 1 capsule by mouth Every Night.) 30 capsule 0     No current facility-administered medications for this visit.      Facility-Administered Medications Ordered in Other Visits   Medication Dose Route Frequency Provider Last Rate Last Dose   • oxyCODONE-acetaminophen (PERCOCET) 5-325 MG per tablet 2 tablet  2 tablet Oral Q6H PRN Arnulfo Serrano MD         No Known Allergies     Review of Systems   Constitution:        Chills, fatigue   HENT: Negative.    Eyes: Negative.    Cardiovascular: Negative.    Respiratory: Negative.    Endocrine: Negative.    Hematologic/Lymphatic: Negative.    Skin: Negative.    Musculoskeletal:        Pertinent positives listed in HPI.     Gastrointestinal: Positive for constipation, melena and nausea.   Genitourinary: Negative.    Neurological: Negative.    Psychiatric/Behavioral: Negative.    Allergic/Immunologic: Negative.        Vitals:    04/05/17 1500   BP: 144/97   Pulse: 74   Weight: 221 lb (100 kg)   Height: 71\" (180.3 cm)       Physical Exam: 32 y.o. male .  General Appearance:    Well appearing, cooperative, in no acute distress.       Patient is alert and oriented x 3.            Musculoskeletal: Left Shoulder Exam     Tenderness   None    Range of Motion   Active Abduction:                       Normal  Extension:                                  Normal  Forward Flexion:   "                      130  External Rotation:                      50  Internal Rotation 0 degrees:      Mid Thoracic    Muscle Strength   Normal left shoulder strength    Tests   Impingement:   Negative  Martinez:          Negative  Cross Arm:      Negative  Drop Arm:        Negative  Apprehension: Positive    Right Shoulder Exam     Tenderness   The patient is experiencing tenderness in the glenohumeral jointline tenderness.    Range of Motion   Active Abduction:                       Normal  External Rotation:                      40  Internal Rotation 0 degrees:      Lumbar    Muscle Strength   Normal right shoulder strength  Supraspinatus:     5/5    Tests   Impingement:   Negative  Martinez:          Negative  Cross Arm:      Negative  Drop Arm:        Negative  Apprehension: Positive              Radiology:       XR SHOULDER 2+ VIEW BILATERAL-       FINDINGS: Three views of the right shoulder and 3 views of the left  shoulder demonstrate glenohumeral narrowing bilaterally. There is  irregularity of the glenoid in both shoulders. I would suggest  orthopedic follow-up.      IMPRESSION:  Narrowing of the glenohumeral joints and irregularity of the  glenoid bilaterally. This is unusual for a patient of this age. I would  recommend orthopedic follow-up.       This report was finalized on 3/1/2017 2:06 PM by Dr. Turner Friend MD.          Procedures    Assesment:    ICD-10-CM ICD-9-CM   1. Osteoarthritis of bilateral glenohumeral joints M19.011 715.91    M19.012          Plan:    Discussed the possibility of referring to a dedicated shoulder specialist in reference to a possible hemiarthroplasty resurfacing. Given his age as well as his occupation the goal will be for him will be to hold off surgical fixation at this point. He is trying to find another occupation that places less stress on his joints as well. He received a prescription for Celebrex 200 mg daily. The patient will return in the event he wishes to  proceed with referral for surgical consultation.       Discussion/Summary:    Written by Dwayne Casas PA-C, acting as scribe for Dr. Mario Alberto ROSA, Aakash Llanes MD, personally performed the services described in this documentation as scribed by the above named individual in my presence, and it is both accurate and complete.  4/5/2017  8:24 PM      This document was signed by Dwayne Casas PA-C April 5, 2017

## 2017-10-09 ENCOUNTER — OFFICE VISIT (OUTPATIENT)
Dept: ORTHOPEDIC SURGERY | Facility: CLINIC | Age: 32
End: 2017-10-09

## 2017-10-09 VITALS — BODY MASS INDEX: 32.78 KG/M2 | HEIGHT: 72 IN | WEIGHT: 242 LBS

## 2017-10-09 DIAGNOSIS — M19.012 OSTEOARTHRITIS OF BILATERAL GLENOHUMERAL JOINTS: Primary | ICD-10-CM

## 2017-10-09 DIAGNOSIS — M19.011 OSTEOARTHRITIS OF BILATERAL GLENOHUMERAL JOINTS: Primary | ICD-10-CM

## 2017-10-09 PROCEDURE — 99213 OFFICE O/P EST LOW 20 MIN: CPT | Performed by: ORTHOPAEDIC SURGERY

## 2017-10-09 RX ORDER — OXYCODONE AND ACETAMINOPHEN 10; 325 MG/1; MG/1
TABLET ORAL
Refills: 0 | COMMUNITY
Start: 2017-08-25 | End: 2018-02-28

## 2017-10-09 RX ORDER — CEFDINIR 300 MG/1
CAPSULE ORAL
Refills: 0 | COMMUNITY
Start: 2017-09-28 | End: 2017-11-22

## 2017-10-09 NOTE — PROGRESS NOTES
"Ritesh Azevedo   :1985    Date of encounter:10/09/2017        HPI:  Ritesh Azevedo is a 32 y.o. male who returns here today for follow-up of glenohumeral arthritis the bilateral shoulders.  Since his last office visit he was started on Celebrex.  He states he did discontinue the use of this determination with his kidneys unrelated to the medication.  He states he really didn't see much improvement when he was able to take it.  His 2 continuing to complain of significant pain and decreased range of motion in the bilateral shoulders.  He states that the pain makes it difficulty with activities of daily living and with labor work.    PMH:   Patient Active Problem List   Diagnosis   • Sepsis due to urinary tract infection   • Incomplete bladder emptying       Exam:  General Appearance:    32 y.o. male  cooperative, in no acute distress.  Alert and oriented x 3,                   Vitals:    10/09/17 1021   Weight: 242 lb (110 kg)   Height: 72\" (182.9 cm)       Examination the bilateral shoulders reveals 4 flexion to 130°.  He has external rotation to 50°.  He has tenderness along the anterior joint line.  His strength is good.    Radiology:   Previous x-rays from 2017 were reviewed revealing narrowing of the glenohumeral joint and irregularity of the glenoid bilaterally.  There is also a large osteophyte off of the humeral head.    Assessment    ICD-10-CM ICD-9-CM   1. Osteoarthritis of bilateral glenohumeral joints M19.011 715.91    M19.012        Plan:   A 32-year-old male with bilateral glenohumeral joint arthritis.  Situation was discussed with the patient he has decided that he would like to undergo surgical intervention.  We will refer him out to Dr. Borrego a shoulder specialist in [place].  He'll return back on an as-needed basis.    Written by, Jaqueline MILLER, acting as a scribe for Dr. Mario Alberto BERNAL                 "

## 2017-11-15 ENCOUNTER — OFFICE VISIT (OUTPATIENT)
Dept: PSYCHIATRY | Facility: CLINIC | Age: 32
End: 2017-11-15

## 2017-11-15 DIAGNOSIS — F41.1 GENERALIZED ANXIETY DISORDER: Primary | ICD-10-CM

## 2017-11-15 PROCEDURE — 90791 PSYCH DIAGNOSTIC EVALUATION: CPT | Performed by: SOCIAL WORKER
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
## 2017-11-22 ENCOUNTER — OFFICE VISIT (OUTPATIENT)
Dept: PSYCHIATRY | Facility: CLINIC | Age: 32
End: 2017-11-22

## 2017-11-22 VITALS
WEIGHT: 240 LBS | SYSTOLIC BLOOD PRESSURE: 135 MMHG | HEIGHT: 72 IN | HEART RATE: 74 BPM | BODY MASS INDEX: 32.51 KG/M2 | DIASTOLIC BLOOD PRESSURE: 91 MMHG

## 2017-11-22 DIAGNOSIS — F43.23 ADJUSTMENT DISORDER WITH MIXED ANXIETY AND DEPRESSED MOOD: ICD-10-CM

## 2017-11-22 DIAGNOSIS — Z79.899 MEDICATION MANAGEMENT: Primary | ICD-10-CM

## 2017-11-22 LAB
AMPHETAMINE CUT-OFF: ABNORMAL
BENZODIAZIPINE CUT-OFF: ABNORMAL
BUPRENORPHINE CUT-OFF: ABNORMAL
COCAINE CUT-OFF: ABNORMAL
EXTERNAL AMPHETAMINE SCREEN URINE: NEGATIVE
EXTERNAL BENZODIAZEPINE SCREEN URINE: NEGATIVE
EXTERNAL BUPRENORPHINE SCREEN URINE: NEGATIVE
EXTERNAL COCAINE SCREEN URINE: NEGATIVE
EXTERNAL MDMA: NEGATIVE
EXTERNAL METHADONE SCREEN URINE: NEGATIVE
EXTERNAL METHAMPHETAMINE SCREEN URINE: NEGATIVE
EXTERNAL OPIATES SCREEN URINE: NEGATIVE
EXTERNAL OXYCODONE SCREEN URINE: POSITIVE
EXTERNAL THC SCREEN URINE: NEGATIVE
MDMA CUT-OFF: ABNORMAL
METHADONE CUT-OFF: ABNORMAL
METHAMPHETAMINE CUT-OFF: ABNORMAL
OPIATES CUT-OFF: ABNORMAL
OXYCODONE CUT-OFF: ABNORMAL
THC CUT-OFF: ABNORMAL

## 2017-11-22 PROCEDURE — 99214 OFFICE O/P EST MOD 30 MIN: CPT | Performed by: NURSE PRACTITIONER

## 2017-11-22 RX ORDER — OXYBUTYNIN CHLORIDE 5 MG/1
5 TABLET ORAL 2 TIMES DAILY
COMMUNITY
End: 2018-05-15 | Stop reason: DRUGHIGH

## 2017-11-22 RX ORDER — TESTOSTERONE CYPIONATE 200 MG/ML
INJECTION, SOLUTION INTRAMUSCULAR
COMMUNITY

## 2017-11-22 RX ORDER — OMEPRAZOLE 40 MG/1
40 CAPSULE, DELAYED RELEASE ORAL DAILY
COMMUNITY

## 2017-11-22 RX ORDER — ESCITALOPRAM OXALATE 5 MG/1
5 TABLET ORAL DAILY
Qty: 30 TABLET | Refills: 0 | Status: SHIPPED | OUTPATIENT
Start: 2017-11-22 | End: 2017-12-20 | Stop reason: SDUPTHER

## 2017-11-22 NOTE — PROGRESS NOTES
"  Subjective   Ritesh Azevedo is a 32 y.o. male is here today for medication management follow-up at the [facility], he was referred by his therapist for depression and anxiety attacks. He presents to his appointment on time     Chief Complaint: Depression    History of Present Illness  He states that he has gone through a lot this past year, had to have a lot of surgeries because of kidney stones, lost his job, chronic pain, with problems controlling his anger.  He states that he has overwhelming sadness, periods of breaking down and crying.  He shares that his energy levels are low- motivation is lacking and finds it difficult to complete ADLs- he has been given testosterone injection but no positive result.  He states that he tends to isolate, able to go out in public.  He has thoughts and they get him down.  He states that he has feelings of being worthless, useless, and hopeless.  HE states that he doesn't sleep well because of his chronic pain, he may be getting 2-3 hours per night with no NM. He states that his appetite has increased with noted weight gain.  Anxiety: worries a lot, feels stressed out because he is depending on family to help pay bills, \"what if?\" thoughts, believes that the worse is going to happen, irritable and doesn't like to be bothered, shares that he had a panic attacks with a trigger of \"thinking how bad things are\".  More angry later, he shares that he tends to say and do things that he shouldn't- so he tends to leave the situation.  Denies any prolonged anahi spells, denies any impulsive behavior.  Denies any OCD, denies any ADHD symptoms.  Reports that he has flashbacks of different past events but denies any traumatic events.  Denies any AV hallucinations, denies any paranoia or delusions.  Reports SI thoughts about 2 days ago, \"what if I wasn't here things would be better for the rest of my family\"- denies any plan or intent.  Denies any HI.  He has been having symptoms since " this beginning of the year with the beginning of his health issues.  He states that he was stressed out before the events but never had issues with depression.    No history of seizures or concussions.      Substance use: denies any ETOH, THC, illicit drug use, or RX drug abuse.  Nicotine: stopped using last year- currently on vapor.  Caffeine: denies.  THALIA: no recent reports; UDS- OPI.        The following portions of the patient's history were reviewed and updated as appropriate: allergies, current medications, past family history, past medical history, past social history, past surgical history and problem list.    Review of Systems   Constitutional: Negative for appetite change, chills, diaphoresis, fatigue, fever and unexpected weight change.   HENT: Negative for hearing loss, sore throat, trouble swallowing and voice change.    Eyes: Negative for photophobia and visual disturbance.   Respiratory: Negative for cough, chest tightness and shortness of breath.    Cardiovascular: Negative for chest pain and palpitations.   Gastrointestinal: Negative for abdominal pain, constipation, nausea and vomiting.   Endocrine: Negative for cold intolerance and heat intolerance.   Genitourinary: Positive for flank pain. Negative for dysuria and frequency.        History of kidney stones    Musculoskeletal: Negative for arthralgias, back pain, joint swelling and neck stiffness.        Shoulders and left knee   Skin: Negative for color change and wound.   Allergic/Immunologic: Negative for environmental allergies and immunocompromised state.   Neurological: Negative for dizziness, tremors, seizures, syncope, weakness, light-headedness and headaches.   Hematological: Negative for adenopathy. Does not bruise/bleed easily.       Objective   Physical Exam   Constitutional: He appears well-developed and well-nourished. No distress.   Neurological: He is alert. Coordination and gait normal.   Vitals reviewed.    Blood pressure  "135/91, pulse 74, height 72\" (182.9 cm), weight 240 lb (109 kg).    Medication List:   Current Outpatient Prescriptions   Medication Sig Dispense Refill   • omeprazole (priLOSEC) 40 MG capsule Take 40 mg by mouth Daily.     • ondansetron (ZOFRAN) 4 MG tablet Take 1 tablet by mouth Every 8 (Eight) Hours As Needed for nausea or vomiting. 30 tablet 0   • oxybutynin (DITROPAN) 5 MG tablet Take 5 mg by mouth 2 (Two) Times a Day.     • oxyCODONE-acetaminophen (PERCOCET)  MG per tablet TK 1 T PO Q 4 H PRN  0   • Testosterone Cypionate (DEPOTESTOTERONE CYPIONATE) 200 MG/ML injection Inject  into the shoulder, thigh, or buttocks Every 14 (Fourteen) Days.     • escitalopram (LEXAPRO) 5 MG tablet Take 1 tablet by mouth Daily. 30 tablet 0     No current facility-administered medications for this visit.      Facility-Administered Medications Ordered in Other Visits   Medication Dose Route Frequency Provider Last Rate Last Dose   • oxyCODONE-acetaminophen (PERCOCET) 5-325 MG per tablet 2 tablet  2 tablet Oral Q6H PRN Arnulfo Serrano MD           Mental Status Exam:   Hygiene:   fair  Cooperation:  Cooperative  Eye Contact:  Fair  Psychomotor Behavior:  Appropriate  Affect:  Appropriate  Hopelessness: Denies  Speech:  Normal  Thought Process:  Goal directed and Linear  Thought Content:  Mood congurent  Suicidal:  None  Homicidal:  None  Hallucinations:  None  Delusion:  None  Memory:  Intact  Orientation:  Person, Place, Time and Situation  Reliability:  fair  Insight:  Fair  Judgement:  Fair  Impulse Control:  Fair  Physical/Medical Issues:  Yes chronic pain    Assessment/Plan   Problems Addressed this Visit     None      Visit Diagnoses     Medication management    -  Primary    Relevant Orders    [facility] Drug Screen (Completed)    Adjustment disorder with mixed anxiety and depressed mood        Relevant Medications    escitalopram (LEXAPRO) 5 MG tablet        Discussed medication options.  Begin lexapro for anxiety " and depression.  Reviewed the risks, benefits, and side effects of the medications; patient acknowledged and verbally consented.  Patient is agreeable to call the [facility].  Patient is aware to call 911 or go to the nearest ER should begin having SI/HI.     Return in 4 weeks

## 2017-12-20 ENCOUNTER — OFFICE VISIT (OUTPATIENT)
Dept: PSYCHIATRY | Facility: CLINIC | Age: 32
End: 2017-12-20

## 2017-12-20 VITALS
DIASTOLIC BLOOD PRESSURE: 83 MMHG | BODY MASS INDEX: 32.37 KG/M2 | WEIGHT: 239 LBS | SYSTOLIC BLOOD PRESSURE: 136 MMHG | HEIGHT: 72 IN | HEART RATE: 81 BPM

## 2017-12-20 DIAGNOSIS — F43.23 ADJUSTMENT DISORDER WITH MIXED ANXIETY AND DEPRESSED MOOD: Primary | ICD-10-CM

## 2017-12-20 PROCEDURE — 99213 OFFICE O/P EST LOW 20 MIN: CPT | Performed by: NURSE PRACTITIONER

## 2017-12-20 RX ORDER — QUETIAPINE FUMARATE 50 MG/1
50 TABLET, FILM COATED ORAL NIGHTLY
Qty: 30 TABLET | Refills: 0 | Status: SHIPPED | OUTPATIENT
Start: 2017-12-20 | End: 2018-02-28 | Stop reason: SDDI

## 2017-12-20 RX ORDER — ESCITALOPRAM OXALATE 20 MG/1
20 TABLET ORAL DAILY
Qty: 30 TABLET | Refills: 0 | Status: SHIPPED | OUTPATIENT
Start: 2017-12-20 | End: 2018-02-28 | Stop reason: SDDI

## 2017-12-20 NOTE — PROGRESS NOTES
"  Subjective   Ritesh Azevedo is a 32 y.o. male is here today for medication management follow-up at the [facility].  He presents to his appointment on time     Chief Complaint: Depression    History of Present Illness  He states that he hasn't been doing too well, he has been sick and is currently on antibiotics for treatment, he states that he doesn't believe that it has been helpful.  He was diagnosed with bronchitis.  He is worried that his wife may have flu.  He states that the lexapro is not been helpful- has taken up to 2 tablets to see if it would be beneficial.  He denies any positive effective.  Discussed that the positive effects of medications may not be seen for up to 4-6 weeks.  He is agreeable to an increase in medications.  He rates his depression 8/10 with 10 being the worse, he is breaking down and not able to control his symptoms, little things tend to set him off.  He has been off for 10 months from work and he finds it hard to depend on others.  He shares that his feels like his anxiety has been so bad in the last 2 weeks, he feels like his memory is \"slipping\".  He is forgetting things more easily, he almost forgot his appointment today.  He feels like his sleep has been disrupted with difficulties falling and staying asleep, he is getting possibly 2 hours of sleep per night, denies any naps during the day, denies any NM.  Appetite is decreased with slight weight loss, he eats mostly at night or \"not at all\".  He shares that he is stressed because he can't remember what he did yesterday- to the extent is questionable as he is able to talk about his wife being sick, taking more lexapro, getting headaches.  He states that he sees different spots of colored lights which he first noticed about 2 months ago while driving down the road.  Denies any SI/HI, he thinks at times that certain people would be better if he wasn't here.  He continues to take OPI when needed, continues to have issues with " "his kidneys.  He states that he has been prescribed restoril on the past for sleep and it didn't help, informed to avoid combining with the seroquel.  He questions taking klonopin as his mother-in-law is on it.      THALIA: no recent reports; UDS- OPI.      The following portions of the patient's history were reviewed and updated as appropriate: allergies, current medications, past family history, past medical history, past social history, past surgical history and problem list.    Review of Systems   Constitutional: Negative for appetite change, chills, diaphoresis, fatigue, fever and unexpected weight change.   HENT: Negative for hearing loss, sore throat, trouble swallowing and voice change.    Eyes: Negative for photophobia and visual disturbance.   Respiratory: Negative for cough, chest tightness and shortness of breath.    Cardiovascular: Negative for chest pain and palpitations.   Gastrointestinal: Negative for abdominal pain, constipation, nausea and vomiting.   Endocrine: Negative for cold intolerance and heat intolerance.   Genitourinary: Positive for flank pain. Negative for dysuria and frequency.        History of kidney stones    Musculoskeletal: Negative for arthralgias, back pain, joint swelling and neck stiffness.        Shoulders and left knee   Skin: Negative for color change and wound.   Allergic/Immunologic: Negative for environmental allergies and immunocompromised state.   Neurological: Negative for dizziness, tremors, seizures, syncope, weakness, light-headedness and headaches.   Hematological: Negative for adenopathy. Does not bruise/bleed easily.       Objective   Physical Exam   Constitutional: He appears well-developed and well-nourished. No distress.   Neurological: He is alert. Coordination and gait normal.   Vitals reviewed.    Blood pressure 136/83, pulse 81, height 182.9 cm (72\"), weight 108 kg (239 lb).    Medication List:   Current Outpatient Prescriptions   Medication Sig Dispense " Refill   • escitalopram (LEXAPRO) 20 MG tablet Take 1 tablet by mouth Daily. 30 tablet 0   • omeprazole (priLOSEC) 40 MG capsule Take 40 mg by mouth Daily.     • ondansetron (ZOFRAN) 4 MG tablet Take 1 tablet by mouth Every 8 (Eight) Hours As Needed for nausea or vomiting. 30 tablet 0   • oxybutynin (DITROPAN) 5 MG tablet Take 5 mg by mouth 2 (Two) Times a Day.     • oxyCODONE-acetaminophen (PERCOCET)  MG per tablet TK 1 T PO Q 4 H PRN  0   • Testosterone Cypionate (DEPOTESTOTERONE CYPIONATE) 200 MG/ML injection Inject  into the shoulder, thigh, or buttocks Every 14 (Fourteen) Days.     • QUEtiapine (SEROquel) 50 MG tablet Take 1 tablet by mouth Every Night. 30 tablet 0     No current facility-administered medications for this visit.      Facility-Administered Medications Ordered in Other Visits   Medication Dose Route Frequency Provider Last Rate Last Dose   • oxyCODONE-acetaminophen (PERCOCET) 5-325 MG per tablet 2 tablet  2 tablet Oral Q6H PRN Arnulfo Serrano MD           Mental Status Exam:   Hygiene:   fair  Cooperation:  Cooperative  Eye Contact:  Fair  Psychomotor Behavior:  Appropriate  Affect:  Appropriate  Hopelessness: Denies  Speech:  Normal  Thought Process:  Goal directed and Linear  Thought Content:  Mood congurent  Suicidal:  None  Homicidal:  None  Hallucinations:  None  Delusion:  None  Memory:  Intact  Orientation:  Person, Place, Time and Situation  Reliability:  fair  Insight:  Fair  Judgement:  Fair  Impulse Control:  Fair  Physical/Medical Issues:  Yes chronic pain    Assessment/Plan   Problems Addressed this Visit     None      Visit Diagnoses     Adjustment disorder with mixed anxiety and depressed mood    -  Primary    Relevant Medications    QUEtiapine (SEROquel) 50 MG tablet    escitalopram (LEXAPRO) 20 MG tablet        Discussed medication options.  Begin lexapro for anxiety and depression, add seroquel 50mg for mood.   Reviewed the risks, benefits, and side effects of the  medications; patient acknowledged and verbally consented.  Patient is agreeable to call the [facility].  Patient is aware to call 911 or go to the nearest ER should begin having SI/HI.     Return in 4 weeks

## 2018-02-02 ENCOUNTER — OFFICE VISIT (OUTPATIENT)
Dept: PSYCHIATRY | Facility: CLINIC | Age: 33
End: 2018-02-02

## 2018-02-02 DIAGNOSIS — F43.23 ADJUSTMENT DISORDER WITH MIXED ANXIETY AND DEPRESSED MOOD: Primary | ICD-10-CM

## 2018-02-02 DIAGNOSIS — F41.1 GENERALIZED ANXIETY DISORDER: ICD-10-CM

## 2018-02-02 PROCEDURE — 90834 PSYTX W PT 45 MINUTES: CPT | Performed by: SOCIAL WORKER

## 2018-02-02 NOTE — PROGRESS NOTES
Date of Service: February 5, 2018  Time In:  9:00 AM  Time Out:  9:45 AM      PROGRESS NOTE  Data:  Ritesh Azevedo is a 33 y.o. male who met with the undersigned for a regularly scheduled individual outpatient psychotherapy session at the [facility].     HPI:   Patient states that he is not doing that well.  He thinks medication may be helping somewhat with his depression, but he continues to have little energy and worries because he is unable to work and has no means of support for his family.  His extended family are helping him out financially, but his wife is not employed and apparently refuses to get a job.  Patient states that he doesn't have friends that he shares his troubles with and doesn't like to burden his family although he does talk some to his mother.  Patient states that he continues to have difficulty sleeping and he continues to have pain; kidney issues are not fully resolved.        Clinical Maneuvering/Intervention:  Assisted patient in processing above session content; acknowledged and normalized patient’s thoughts, feelings, and concerns.  Provided empathy and support as patient processed the above issues.  Encouraged him to work on creating a daily routine including a regular time to go to bed and get up in the morning, and as much exercise as he can tolerate given his arthritis and other pain issues.    Allowed patient to freely discuss issues without interruption or judgment. Provided safe, confidential environment to facilitate the development of positive therapeutic relationship and encourage open, honest communication. Assisted patient in identifying risk factors which would indicate the need for higher level of care including thoughts to harm self or others and/or self-harming behavior and encouraged patient to contact this office, call 911, or present to the nearest emergency room should any of these events occur. Discussed crisis intervention services and means to access.  Patient  adamantly and convincingly denies current suicidal or homicidal ideation or perceptual disturbance.    Assessment     Diagnoses and all orders for this visit:    Adjustment disorder with mixed anxiety and depressed mood    Generalized anxiety disorder               Mental Status Exam  Hygiene:  good  Dress:  casual  Attitude:  Cooperative  Motor Activity:  Appropriate  Speech:  Monotone  Mood:  constricted  Affect:  depressed and flat  Thought Processes:  Goal directed and Linear  Thought Content:  Passive suicidal ideation  Suicidal Thoughts:  denies  Homicidal Thoughts:  denies  Crisis Safety Plan: yes, to come to the emergency room.  Hallucinations:  denies    Patient's Support Network Includes:  wife, parents and extended family    Progress toward goal: Not at goal    Functional Status: Moderate impairment     Prognosis: Fair with Ongoing Treatment     Plan   Continue individual outpatient therapy in 3 weeks with focus on improved functioning and developing coping skills to manage emotions and life circumstances.      Patient will adhere to medication regimen as prescribed and report any side effects. Patient will contact this office, call 911 or present to the nearest emergency room should suicidal or homicidal ideations occur. Provide Cognitive Behavioral Therapy and Integrative Therapy to improve functioning, maintain stability, and avoid decompensation and the need for higher level of care.          Return in about 3 weeks (around 2/23/2018).      This document signed by aRdha Alexander LCSW        February 5, 2018 6:40 PM

## 2018-02-26 ENCOUNTER — OFFICE VISIT (OUTPATIENT)
Dept: PSYCHIATRY | Facility: CLINIC | Age: 33
End: 2018-02-26

## 2018-02-26 DIAGNOSIS — F43.23 ADJUSTMENT DISORDER WITH MIXED ANXIETY AND DEPRESSED MOOD: Primary | ICD-10-CM

## 2018-02-26 DIAGNOSIS — F41.1 GENERALIZED ANXIETY DISORDER: ICD-10-CM

## 2018-02-26 PROCEDURE — 90834 PSYTX W PT 45 MINUTES: CPT | Performed by: SOCIAL WORKER

## 2018-02-26 NOTE — PROGRESS NOTES
Date of Service: February 27, 2018  Time In:  2:25 PM  Time Out:  3:10 PM      PROGRESS NOTE  Data:  Ritesh Azevedo is a 33 y.o. male who met with the undersigned for a regularly scheduled individual outpatient psychotherapy session at 2:25 PM to 3:10 PM.   HPI:   Patient reported he had fallen yesterday while chasing his kids.  He is stiff and sore somewhat more today, but was not seriously hurt.  He states he feels slightly better today. He reports that he stopped 2 medications becaue they didn't seem to help and gave him bad dreams about cheating on his wife.  He couldn't recall for sure which ones he had stopped.  He thought he was still taking Lexapro.  Patient said he talked with an orthopedic surgeon and they are going to do shoulder replacement next month.  He reports he won't be able to use his arm at all for 3 months and will require a long rehabilitation as well.  Patient does feel hopeful that it will reduce his pain enough that he may be able to go back to work.  Patient said that he is feeling somewhat better than he has at previous sessions.  He states he's had no opportunity to establish a daily routine as we had discussed.     Clinical Maneuvering/Intervention:  Assisted patient in processing above session content; acknowledged and normalized patient’s thoughts, feelings, and concerns.  Gave patient feedback that his affect is better today than at last meeting.  Patient could not identify any specific reasons for the improvement in mood and affect.  Encouraged patient to discuss with nurse practitioner the reasons for stopping medication and cautioned him about stopping meds without consulting nurse practitioner due to potential negative effects.  Encouraged patient to work on establishing a routine and exercising to the extent he can.    Allowed patient to freely discuss issues without interruption or judgment. Provided safe, confidential environment to facilitate the development of positive  therapeutic relationship and encourage open, honest communication. Assisted patient in identifying risk factors which would indicate the need for higher level of care including thoughts to harm self or others and/or self-harming behavior and encouraged patient to contact this office, call 911, or present to the nearest emergency room should any of these events occur. Discussed crisis intervention services and means to access.  Patient adamantly and convincingly denies current suicidal or homicidal ideation or perceptual disturbance.    Assessment     Diagnoses and all orders for this visit:    Adjustment disorder with mixed anxiety and depressed mood    Generalized anxiety disorder             Mental Status Exam  Hygiene:  good  Dress:  casual  Attitude:  Cooperative  Motor Activity:  Appropriate  Speech:  Monotone  Mood:  depressed  Affect:  flat  Thought Processes:  Goal directed and Linear  Thought Content:  normal  Suicidal Thoughts:  denies  Homicidal Thoughts:  denies  Crisis Safety Plan: yes, to come to the emergency room.  Hallucinations:  denies    Patient's Support Network Includes:  wife, parents and extended family    Progress toward goal: Not at goal    Functional Status: Moderate impairment     Prognosis: Fair with Ongoing Treatment     Plan   Continue individual outpatient therapy with focus on improved functioning, improved coping skills and avoiding the need for a higher level of care.    Patient will adhere to medication regimen as prescribed and report any side effects. Patient will contact this office, call 911 or present to the nearest emergency room should suicidal or homicidal ideations occur. Provide Cognitive Behavioral Therapy and Integrative Therapy to improve functioning, maintain stability, and avoid decompensation and the need for higher level of care.          Return in about 2 years (around 2/26/2020).      This document signed by Radha Alexander LCSW         February 27, 2018 6:41  PM

## 2018-02-27 NOTE — TREATMENT PLAN
Multi-Disciplinary Problems (from Behavioral Health Treatment Plan)    Active Problems     Problem: Adjustment (Priority: --)  (Start Date: 02/27/18) (Resolve Date: --)    Problem Details:  The patient self-scales this problem as a 7 with 10 being the worst.         Goal Start Date End Date    Patient will implement healthy coping strategies. 02/27/18 --    Goal Details:  Progress toward goal:  Not appropriate to rate progress toward goal since this is the initial treatment plan.         Goal Intervention Frequency Start Date End Date    Assist patient to identify and develop healthy coping strategies Q2 Weeks 02/27/18 05/25/18    Intervention Details:  Duration of treatment until until remission of symptoms.               Problem: Depression (Priority: --)  (Start Date: 02/27/18) (Resolve Date: --)    Problem Details:  The patient self-scales this problem as a 7 with 10 being the worst.         Goal Start Date End Date    Patient will demonstrate the ability to initiate new constructive life skills outside of sessions on a consistent basis. 02/27/18 --    Goal Details:  Progress toward goal:  Not appropriate to rate progress toward goal since this is the initial treatment plan.         Goal Intervention Frequency Start Date End Date    Assist patient in setting attainable activities of daily living goals. Q2 Weeks 02/27/18 05/25/18    Goal Intervention Frequency Start Date End Date    Provide education about depression Q2 Weeks 02/27/18 05/25/18    Intervention Details:  Duration of treatment until until remission of symptoms.         Goal Intervention Frequency Start Date End Date    Assist patient in developing healthy coping strategies. Q2 Weeks 02/27/18 05/25/18    Intervention Details:  Duration of treatment until until remission of symptoms.                     Reviewed By     Radha Alexander LCSW 02/27/18 6820                 I have discussed and reviewed this treatment plan with the patient.  It has been  printed for signatures.

## 2018-02-27 NOTE — TREATMENT PLAN
Multi-Disciplinary Problems (from Behavioral Health Treatment Plan)    Active Problems     Problem: Adjustment (Priority: --)  (Start Date: 02/27/18) (Resolve Date: --)    Problem Details:  The patient self-scales this problem as a 7 with 10 being the worst.         Goal Start Date End Date    Patient will implement healthy coping strategies. 02/27/18 --    Goal Details:  Progress toward goal:  Not appropriate to rate progress toward goal since this is the initial treatment plan.         Goal Intervention Frequency Start Date End Date    Assist patient to identify and develop healthy coping strategies Q2 Weeks 02/27/18 05/25/18    Intervention Details:  Duration of treatment until until remission of symptoms.               Problem: Depression (Priority: --)  (Start Date: 02/27/18) (Resolve Date: --)    Problem Details:  The patient self-scales this problem as a 7 with 10 being the worst.         Goal Start Date End Date    Patient will demonstrate the ability to initiate new constructive life skills outside of sessions on a consistent basis. 02/27/18 --    Goal Details:  Progress toward goal:  Not appropriate to rate progress toward goal since this is the initial treatment plan.         Goal Intervention Frequency Start Date End Date    Assist patient in setting attainable activities of daily living goals. Q2 Weeks 02/27/18 05/25/18    Goal Intervention Frequency Start Date End Date    Provide education about depression Q2 Weeks 02/27/18 05/25/18    Intervention Details:  Duration of treatment until until remission of symptoms.         Goal Intervention Frequency Start Date End Date    Assist patient in developing healthy coping strategies. Q2 Weeks 02/27/18 05/25/18    Intervention Details:  Duration of treatment until until remission of symptoms.                     Reviewed By     Radha Alexander LCSW 02/27/18 1852    Radha Alexander LCSW 02/27/18 1851                 I have discussed and reviewed this  treatment plan with the patient.  It has been printed for signatures.

## 2018-02-28 ENCOUNTER — OFFICE VISIT (OUTPATIENT)
Dept: PSYCHIATRY | Facility: CLINIC | Age: 33
End: 2018-02-28

## 2018-02-28 VITALS
SYSTOLIC BLOOD PRESSURE: 131 MMHG | DIASTOLIC BLOOD PRESSURE: 83 MMHG | BODY MASS INDEX: 32.1 KG/M2 | WEIGHT: 237 LBS | HEIGHT: 72 IN | HEART RATE: 78 BPM

## 2018-02-28 DIAGNOSIS — F41.1 GENERALIZED ANXIETY DISORDER: ICD-10-CM

## 2018-02-28 DIAGNOSIS — F51.05 INSOMNIA DUE TO MENTAL CONDITION: ICD-10-CM

## 2018-02-28 DIAGNOSIS — T50.905D ADVERSE EFFECT OF DRUG, SUBSEQUENT ENCOUNTER: ICD-10-CM

## 2018-02-28 DIAGNOSIS — F43.23 ADJUSTMENT DISORDER WITH MIXED ANXIETY AND DEPRESSED MOOD: Primary | ICD-10-CM

## 2018-02-28 PROCEDURE — 99214 OFFICE O/P EST MOD 30 MIN: CPT | Performed by: NURSE PRACTITIONER

## 2018-02-28 RX ORDER — BUPROPION HYDROCHLORIDE 75 MG/1
75 TABLET ORAL DAILY
Qty: 30 TABLET | Refills: 0 | Status: SHIPPED | OUTPATIENT
Start: 2018-02-28 | End: 2018-03-27 | Stop reason: SDUPTHER

## 2018-02-28 RX ORDER — LISINOPRIL 20 MG/1
TABLET ORAL
COMMUNITY
Start: 2018-02-04 | End: 2018-02-28 | Stop reason: SDDI

## 2018-02-28 RX ORDER — DOXEPIN HYDROCHLORIDE 10 MG/1
10 CAPSULE ORAL NIGHTLY
Qty: 30 CAPSULE | Refills: 0 | Status: SHIPPED | OUTPATIENT
Start: 2018-02-28 | End: 2018-03-27 | Stop reason: SDUPTHER

## 2018-02-28 NOTE — PROGRESS NOTES
"  Subjective   Ritesh Azevedo is a 33 y.o. male is here today for medication management follow-up at the [facility].  He presents to his appointment on time     Chief Complaint: Depression    History of Present Illness    \"I stop taking that medication because it was causing me to have weird dreams.\" He took the medication about 20 doses before discontinuing.  Depression: \"well there has been a couple days that have been good and others worse. Today is alright.\" Rates depression at a 4/10 with 10 being the worst. Denies any self-harming behaviors and SI. These past few weeks I have kind of stayed at home.  Anxiety has not been that bad, one day was stressful, Rates anxiety at a 2/10 with 10 being the worst. He is being sent to a urologist in [place] for a second opinion and is going to have a right shoulder replacement. Continues to be off work. Sleep is horrible. He is sleeping about 1.5-2 hours and wakes up every 15-25 minutes. When he lays down he feels pressure on his bladder/kidney. March 21st is appt with the urologist. Since he quit taking the medication he is no longer having vivid dreams. Appetite: \"I do not want anything to eat during the day but at night he likes to eat.   Body mass index is 32.14 kg/(m^2).  Educated to eat 3 meals per day to prevent over-eating at night. Denies SI/HI/AVH other than his ears ringing, its a different type.    Discussed options, patient is willing to start wellbutrin to assist with depression as well as focus/concentration options.          The following portions of the patient's history were reviewed and updated as appropriate: allergies, current medications, past family history, past medical history, past social history, past surgical history and problem list.    Review of Systems   Constitutional: Negative for appetite change, chills, diaphoresis, fatigue, fever and unexpected weight change.   HENT: Positive for tinnitus. Negative for hearing loss, sore throat, " "trouble swallowing and voice change.    Eyes: Negative for photophobia and visual disturbance.   Respiratory: Negative for cough, chest tightness and shortness of breath.    Cardiovascular: Negative for chest pain and palpitations.   Gastrointestinal: Negative for abdominal pain, constipation, nausea and vomiting.   Endocrine: Negative for cold intolerance and heat intolerance.   Genitourinary: Positive for flank pain. Negative for dysuria and frequency.          Kidney pain   Musculoskeletal: Negative for arthralgias, back pain, joint swelling and neck stiffness.        Shoulders and left knee   Skin: Negative for color change and wound.   Allergic/Immunologic: Negative for environmental allergies and immunocompromised state.   Neurological: Negative for dizziness, tremors, seizures, syncope, weakness, light-headedness and headaches.   Hematological: Negative for adenopathy. Does not bruise/bleed easily.       Objective   Physical Exam   Constitutional: He appears well-developed and well-nourished. No distress.   Genitourinary:   Genitourinary Comments:      Neurological: He is alert. Coordination and gait normal.   Vitals reviewed.    Blood pressure 131/83, pulse 78, height 182.9 cm (72.01\"), weight 108 kg (237 lb).    Medication List:   Current Outpatient Prescriptions   Medication Sig Dispense Refill   • omeprazole (priLOSEC) 40 MG capsule Take 40 mg by mouth Daily.     • ondansetron (ZOFRAN) 4 MG tablet Take 1 tablet by mouth Every 8 (Eight) Hours As Needed for nausea or vomiting. 30 tablet 0   • oxybutynin (DITROPAN) 5 MG tablet Take 5 mg by mouth 2 (Two) Times a Day.     • Testosterone Cypionate (DEPOTESTOTERONE CYPIONATE) 200 MG/ML injection Inject  into the shoulder, thigh, or buttocks Every 14 (Fourteen) Days.     • buPROPion (WELLBUTRIN) 75 MG tablet Take 1 tablet by mouth Daily. 30 tablet 0   • doxepin (SINEquan) 10 MG capsule Take 1 capsule by mouth Every Night. 30 capsule 0     No current " facility-administered medications for this visit.      Facility-Administered Medications Ordered in Other Visits   Medication Dose Route Frequency Provider Last Rate Last Dose   • oxyCODONE-acetaminophen (PERCOCET) 5-325 MG per tablet 2 tablet  2 tablet Oral Q6H PRN Arnulfo Serrano MD           Mental Status Exam:   Hygiene:   fair  Cooperation:  Cooperative  Eye Contact:  Fair  Psychomotor Behavior:  Appropriate  Affect:  Appropriate  Hopelessness: Denies  Speech:  Normal  Thought Process:  Goal directed and Linear  Thought Content:  Mood congurent  Suicidal:  None  Homicidal:  None  Hallucinations:  None  Delusion:  None  Memory:  Intact  Orientation:  Person, Place, Time and Situation  Reliability:  fair  Insight:  Fair  Judgement:  Fair  Impulse Control:  Fair  Physical/Medical Issues:  Yes chronic pain    Assessment/Plan   Problems Addressed this Visit     None      Visit Diagnoses     Adjustment disorder with mixed anxiety and depressed mood    -  Primary    Relevant Medications    doxepin (SINEquan) 10 MG capsule    buPROPion (WELLBUTRIN) 75 MG tablet    Generalized anxiety disorder        Relevant Medications    doxepin (SINEquan) 10 MG capsule    buPROPion (WELLBUTRIN) 75 MG tablet    Insomnia due to mental condition        Relevant Medications    doxepin (SINEquan) 10 MG capsule    buPROPion (WELLBUTRIN) 75 MG tablet    Adverse effect of drug, subsequent encounter            Discussed medication options.  Discontinue lexapro for anxiety and depression, discontinue seroquel 50mg for mood due to vivid dreams. Begin Doxepine for sleep and wellbutrin for anxiety and depression.   Reviewed the risks, benefits, and side effects of the medications; patient acknowledged and verbally consented.  Patient is agreeable to call the [facility].  Patient is aware to call 911 or go to the nearest ER should begin having SI/HI.       Functionality: Fair. Depression seems to be impacting his motivation and energy  levels and social life.  Prognosis: Guarded dependent on medication, follow up appointment and treatment plan compliance          Return in 4 weeks

## 2018-03-12 ENCOUNTER — OFFICE VISIT (OUTPATIENT)
Dept: PSYCHIATRY | Facility: CLINIC | Age: 33
End: 2018-03-12

## 2018-03-12 DIAGNOSIS — F41.1 GENERALIZED ANXIETY DISORDER: Primary | ICD-10-CM

## 2018-03-12 DIAGNOSIS — F43.23 ADJUSTMENT DISORDER WITH MIXED ANXIETY AND DEPRESSED MOOD: ICD-10-CM

## 2018-03-12 PROCEDURE — 90834 PSYTX W PT 45 MINUTES: CPT | Performed by: SOCIAL WORKER

## 2018-03-12 NOTE — PROGRESS NOTES
Date of Service: March 13, 2018  Time In:  2:00 PM  Time Out:  2:50 PM      PROGRESS NOTE  Data:  Ritesh Azevedo is a 33 y.o. male who met with the undersigned for a regularly scheduled individual outpatient psychotherapy session at [facility].      HPI:   Patient reports he went to see his brother in longterm  to try and convince him to get into a drug rehabilitation program.  States he told his brother before that he was done with him, but decided to give him one last chance.  If he chooses not to go into treatment or gets on drugs again patient will cut off contact with him.  Patient also helped his brother-in-law with new naina for his in-laws' home.  Therapist inquired about patient's own needs and he shared feelings about the rheumatoid arthritis diagnosis which was confirmed. Patient will be scheduled with a rheumatologist, but he doesn't know how quickly he will be able to see him.  Patient appeared sad and angry about the diagnosis because the disease is not curable and he will continue to have pain because of it, and probably will never be able to work again.  Patient said he doesn't want to not be in pain the rest of his life or limited in what he is able to do; wants to be able to teach his kids to get better at ball games; wants to have a normal life.    Clinical Maneuvering/Intervention:  Assisted patient in processing above session content; acknowledged and normalized patient’s thoughts, feelings, and concerns.   Provided empathy and support as patient processed the above issues.  Asked patient what he knows about rheumatoid arthritis.  He knows that it causes a lot of pain and can be crippling, and that there is no cure.  Encouraged patient to learn as much as he can about the disease even before he sees a rheumatologist.  Encouraged him to learn about medications as well as alternative pain management techniques.  Normalized his anger and the need to grieve for the loss of some of his dreams.   Suggested that he can become an advocate for himself in managing the disease and that there are no doubt many things he can do to make accommodations for his anticipated physical limitations.  Offered therapist support as patient learns to cope with the changes he expects to have to make.      Allowed patient to freely discuss issues without interruption or judgment. Provided safe, confidential environment to facilitate the development of positive therapeutic relationship and encourage open, honest communication. Assisted patient in identifying risk factors which would indicate the need for higher level of care including thoughts to harm self or others and/or self-harming behavior and encouraged patient to contact this office, call 911, or present to the nearest emergency room should any of these events occur. Discussed crisis intervention services and means to access.  Patient adamantly and convincingly denies current suicidal or homicidal ideation or perceptual disturbance.    Assessment   Patient seemed very depressed and hopeless today, but denies suicidal thoughts.  He will need ongoing individual outpatient therapy as he adjusts emotionally and physically to being a person with a disabling disease.     Diagnoses and all orders for this visit:    Generalized anxiety disorder    Adjustment disorder with mixed anxiety and depressed mood               Mental Status Exam  Hygiene:  good  Dress:  casual  Attitude:  Cooperative  Motor Activity:  Appropriate  Speech:  Monotone  Mood:  depressed  Affect:  depressed and sad  Thought Processes:  Goal directed and Linear  Thought Content:  normal  Suicidal Thoughts:  denies  Homicidal Thoughts:  denies  Crisis Safety Plan: yes, to come to the emergency room.  Hallucinations:  denies    Patient's Support Network Includes:  wife, children, parents and extended family    Progress toward goal: Not at goal    Functional Status: Moderate impairment     Prognosis: Guarded with  Ongoing Treatment    Plan   Continue individual outpatient therapy with focus on improved functioning, positive coping skills and avoiding the compensation.    Patient will adhere to medication regimen as prescribed and report any side effects. Patient will contact this office, call 911 or present to the nearest emergency room should suicidal or homicidal ideations occur. Provide Cognitive Behavioral Therapy and Integrative Therapy to improve functioning, maintain stability, and avoid decompensation and the need for higher level of care.          Return in about 1 week (around 3/19/2018).      This document signed by Radha Alexander LCSW             March 13, 2018 2:54 PM

## 2018-03-27 ENCOUNTER — OFFICE VISIT (OUTPATIENT)
Dept: PSYCHIATRY | Facility: CLINIC | Age: 33
End: 2018-03-27

## 2018-03-27 VITALS
WEIGHT: 238 LBS | DIASTOLIC BLOOD PRESSURE: 78 MMHG | BODY MASS INDEX: 32.23 KG/M2 | HEART RATE: 67 BPM | HEIGHT: 72 IN | SYSTOLIC BLOOD PRESSURE: 116 MMHG

## 2018-03-27 DIAGNOSIS — F41.1 GENERALIZED ANXIETY DISORDER: Primary | ICD-10-CM

## 2018-03-27 DIAGNOSIS — F43.23 ADJUSTMENT DISORDER WITH MIXED ANXIETY AND DEPRESSED MOOD: ICD-10-CM

## 2018-03-27 DIAGNOSIS — F51.05 INSOMNIA DUE TO MENTAL CONDITION: ICD-10-CM

## 2018-03-27 PROCEDURE — 99214 OFFICE O/P EST MOD 30 MIN: CPT | Performed by: NURSE PRACTITIONER

## 2018-03-27 RX ORDER — OXYBUTYNIN CHLORIDE 10 MG/1
TABLET, EXTENDED RELEASE ORAL
COMMUNITY
Start: 2018-01-29

## 2018-03-27 RX ORDER — LISINOPRIL 20 MG/1
TABLET ORAL
COMMUNITY
Start: 2018-03-18

## 2018-03-27 RX ORDER — BUPROPION HYDROCHLORIDE 75 MG/1
75 TABLET ORAL DAILY
Qty: 30 TABLET | Refills: 0 | Status: SHIPPED | OUTPATIENT
Start: 2018-03-27 | End: 2018-04-25 | Stop reason: SDUPTHER

## 2018-03-27 RX ORDER — DOXEPIN HYDROCHLORIDE 50 MG/1
CAPSULE ORAL
Qty: 60 CAPSULE | Refills: 0 | Status: SHIPPED | OUTPATIENT
Start: 2018-03-27 | End: 2018-04-25 | Stop reason: SDUPTHER

## 2018-03-27 NOTE — PROGRESS NOTES
Subjective   Ritesh Azevedo is a 33 y.o. male is here today for medication management follow-up at the [facility].  He presents to his appointment on time     Chief Complaint: Depression    History of Present Illness  He states that he is not doing too bad, shares that the medication is not helping him sleep, he shares that he doubled up and it still didn't help.  He denies any SE or problems.  He shares that he is having issues with falling and staying asleep, he shares that he is getting about 2 hours per night, denies any NM.  He shares that he hasn't been able to see any difference with the wellbutrin, shares that his anxiety is not as bad as it was but the depression is still really high.  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  This could be contributing to his other physical conditions.  Rates his depression 3/10, anxiety/10 with 10 being the worse.  Appetite is good with no significant weight changes. He continues to have chronic pain, denies any recent antibiotics.  Stressors include multiple health issues.  Denies any AV hallucinations, denies any SI/HI.      Discussed increasing the doxepin to assist with sleep, however efficacy is questionable in relationship to chronic pain.      The following portions of the patient's history were reviewed and updated as appropriate: allergies, current medications, past family history, past medical history, past social history, past surgical history and problem list.    Review of Systems   Constitutional: Negative for appetite change, chills, diaphoresis, fatigue, fever and unexpected weight change.   HENT: Negative for hearing loss, sore throat, trouble swallowing and voice change.    Eyes: Negative for photophobia and visual disturbance.   Respiratory: Negative for cough, chest tightness and shortness of breath.    Cardiovascular: Negative for chest pain and palpitations.   Gastrointestinal:  "Negative for abdominal pain, constipation, nausea and vomiting.   Endocrine: Negative for cold intolerance and heat intolerance.   Genitourinary: Negative for dysuria and frequency.          Kidney pain   Musculoskeletal: Negative for arthralgias, back pain, joint swelling and neck stiffness.        Shoulders and left knee   Skin: Negative for color change and wound.   Allergic/Immunologic: Negative for environmental allergies and immunocompromised state.   Neurological: Negative for dizziness, tremors, seizures, syncope, weakness, light-headedness and headaches.   Hematological: Negative for adenopathy. Does not bruise/bleed easily.     Objective   Physical Exam   Constitutional: He appears well-developed and well-nourished. No distress.   Genitourinary:   Genitourinary Comments:      Neurological: He is alert. Coordination and gait normal.   Vitals reviewed.    Blood pressure 116/78, pulse 67, height 182.9 cm (72.01\"), weight 108 kg (238 lb).    Medication List:   Current Outpatient Prescriptions   Medication Sig Dispense Refill   • buPROPion (WELLBUTRIN) 75 MG tablet Take 1 tablet by mouth Daily. 30 tablet 0   • doxepin (SINEquan) 50 MG capsule Take 1-2 capsules by mouth every night at bedtime 60 capsule 0   • omeprazole (priLOSEC) 40 MG capsule Take 40 mg by mouth Daily.     • oxybutynin (DITROPAN) 5 MG tablet Take 5 mg by mouth 2 (Two) Times a Day.     • Testosterone Cypionate (DEPOTESTOTERONE CYPIONATE) 200 MG/ML injection Inject  into the shoulder, thigh, or buttocks Every 14 (Fourteen) Days.     • lisinopril (PRINIVIL,ZESTRIL) 20 MG tablet      • oxybutynin XL (DITROPAN-XL) 10 MG 24 hr tablet        No current facility-administered medications for this visit.      Facility-Administered Medications Ordered in Other Visits   Medication Dose Route Frequency Provider Last Rate Last Dose   • oxyCODONE-acetaminophen (PERCOCET) 5-325 MG per tablet 2 tablet  2 tablet Oral Q6H PRN Arnulfo Serrano MD     "       Mental Status Exam:   Hygiene:   fair  Cooperation:  Cooperative  Eye Contact:  Fair  Psychomotor Behavior:  Appropriate  Affect:  Appropriate  Hopelessness: Denies  Speech:  Normal  Thought Process:  Goal directed and Linear  Thought Content:  Mood congurent  Suicidal:  None  Homicidal:  None  Hallucinations:  None  Delusion:  None  Memory:  Intact  Orientation:  Person, Place, Time and Situation  Reliability:  fair  Insight:  Fair  Judgement:  Fair  Impulse Control:  Fair  Physical/Medical Issues:  Yes chronic pain    Assessment/Plan   Problems Addressed this Visit     None      Visit Diagnoses     Generalized anxiety disorder    -  Primary    Relevant Medications    buPROPion (WELLBUTRIN) 75 MG tablet    doxepin (SINEquan) 50 MG capsule    Adjustment disorder with mixed anxiety and depressed mood        Relevant Medications    buPROPion (WELLBUTRIN) 75 MG tablet    doxepin (SINEquan) 50 MG capsule    Insomnia due to mental condition        Relevant Medications    buPROPion (WELLBUTRIN) 75 MG tablet    doxepin (SINEquan) 50 MG capsule        Discussed medication options.   Increase Doxepine for sleep and wellbutrin for anxiety and depression.   Reviewed the risks, benefits, and side effects of the medications; patient acknowledged and verbally consented.  Patient is agreeable to call the [facility].  Patient is aware to call 911 or go to the nearest ER should begin having SI/HI.       Functionality: Fair. Depression seems to be impacting his motivation and energy levels and social life.  Prognosis: Guarded dependent on medication, follow up appointment and treatment plan compliance          Return in 4 weeks

## 2018-04-25 DIAGNOSIS — F51.05 INSOMNIA DUE TO MENTAL CONDITION: ICD-10-CM

## 2018-04-25 DIAGNOSIS — F43.23 ADJUSTMENT DISORDER WITH MIXED ANXIETY AND DEPRESSED MOOD: ICD-10-CM

## 2018-04-25 DIAGNOSIS — F41.1 GENERALIZED ANXIETY DISORDER: ICD-10-CM

## 2018-04-25 RX ORDER — BUPROPION HYDROCHLORIDE 75 MG/1
75 TABLET ORAL DAILY
Qty: 30 TABLET | Refills: 0 | Status: SHIPPED | OUTPATIENT
Start: 2018-04-25 | End: 2018-05-15 | Stop reason: DRUGHIGH

## 2018-04-25 RX ORDER — DOXEPIN HYDROCHLORIDE 50 MG/1
CAPSULE ORAL
Qty: 60 CAPSULE | Refills: 0 | Status: SHIPPED | OUTPATIENT
Start: 2018-04-25 | End: 2018-05-15 | Stop reason: SDUPTHER

## 2018-04-25 NOTE — TELEPHONE ENCOUNTER
RX request, Patient forgot about his appt, Also mention about increasing his doxepin made him aware you might wanted to see him first before you increased med.

## 2018-04-25 NOTE — TELEPHONE ENCOUNTER
Patient informed he would need to be seen before increasing his Doxepin also aware scripts have been sent in.

## 2018-05-01 ENCOUNTER — OFFICE VISIT (OUTPATIENT)
Dept: PSYCHIATRY | Facility: CLINIC | Age: 33
End: 2018-05-01

## 2018-05-01 DIAGNOSIS — F41.1 GENERALIZED ANXIETY DISORDER: Primary | ICD-10-CM

## 2018-05-01 DIAGNOSIS — F43.23 ADJUSTMENT DISORDER WITH MIXED ANXIETY AND DEPRESSED MOOD: ICD-10-CM

## 2018-05-01 PROCEDURE — 90837 PSYTX W PT 60 MINUTES: CPT | Performed by: SOCIAL WORKER

## 2018-05-01 NOTE — PROGRESS NOTES
"Date of Service: May 1, 2018  Time In:  2:45 PM  Time Out:  3:40 PM      PROGRESS NOTE  Data:  Ritesh Azevedo is a 33 y.o. male who met with the undersigned for a regularly scheduled individual outpatient therapy session at the [facility].     HPI:   Patient reported seeing the rheumatologist and he has started taking medication for rheumatoid arthritis.  It made him extremely tired for several days, but he's gotten past that and it is helping somewhat with his pain.  He was tested for lupus and will return to the rheumatologist on May 7, and hopes to hear the results.  He also has shoulder replacement surgery scheduled for June 21.  Patient anticipates a six-month rehabilitation, after which he will have the other third shoulder replaced.  He hopes his disability will be approved prior to surgery.    Patient talked about his relationship with his anat.  He said that she is very self-centered, that she yells and screams at the children when they misbehave, and that their relationship is not very happy.  Patient is not willing to leave his children, though, and at the moment he feels he doesn't have any options.  They have been living on money loaned or given to them, plus the little money he has earned playing cards and what she has earned by doing online surveys.  Patient reports that anat does not consider going to work and he isn't sure why.  She may have anxiety or depression although it doesn't seem that way to him.  He said that she is always angry.  What she would like is for him to \"give up everything\"--doctor's appointments, disability, surgery--and go back to work.    Clinical Maneuvering/Intervention:  Assisted patient in processing above session content; acknowledged and normalized patient’s thoughts, feelings, and concerns.  Provided empathy and support as patient processed the above issues.  Validated his relief at finally having a diagnosis and medication that helps at least to some degree.  " Provided information that depression and anxiety can manifest as anger.  Suggested that a mental health evaluation might be helpful for his wife if she is willing to come.  Patient is doubtful that he could even bring up the subject with her successfully.    Allowed patient to freely discuss issues without interruption or judgment. Provided safe, confidential environment to facilitate the development of positive therapeutic relationship and encourage open, honest communication. Assisted patient in identifying risk factors which would indicate the need for higher level of care including thoughts to harm self or others and/or self-harming behavior and encouraged patient to contact this office, call 911, or present to the nearest emergency room should any of these events occur. Discussed crisis intervention services and means to access.  Patient adamantly and convincingly denies current suicidal or homicidal ideation or perceptual disturbance.    Assessment    Patient seemed to have more energy today and was more talkative.  He reports medication has helped with his arthritis pain.  He continues to struggle with anxiety related to his health and financial issues, and with unhappiness in his relationship.  He will need ongoing individual counseling to address these issues.    Diagnoses and all orders for this visit:    Generalized anxiety disorder    Adjustment disorder with mixed anxiety and depressed mood    Mental Status Exam  Hygiene:  good  Dress:  casual  Attitude:  Cooperative  Motor Activity:  Appropriate  Speech:  Monotone  Mood:  constricted  Affect:  flat  Thought Processes:  Goal directed and Linear  Thought Content:  normal  Suicidal Thoughts:  denies  Homicidal Thoughts:  denies  Crisis Safety Plan: yes, to come to the emergency room.  Hallucinations:  denies    Patient's Support Network Includes:  significant other, children and extended family    Progress toward goal: Not at goal    Functional Status:  Moderate impairment     Prognosis: Fair with Ongoing Treatment     Plan   Patient will continue individual outpatient therapy with focus on improved functioning and coping skills, and avoiding decompensation and the need for higher level of care.    Patient will adhere to medication regimen as prescribed and report any side effects. Patient will contact this office, call 911 or present to the nearest emergency room should suicidal or homicidal ideations occur. Provide Cognitive Behavioral Therapy and Integrative Therapy to improve functioning, maintain stability, and avoid decompensation and the need for higher level of care.          Return in about 3 weeks (around 5/22/2018).      This document signed by Radha Alexander LCSW,      May 1, 2018 4:03 PM

## 2018-05-15 ENCOUNTER — OFFICE VISIT (OUTPATIENT)
Dept: PSYCHIATRY | Facility: CLINIC | Age: 33
End: 2018-05-15

## 2018-05-15 VITALS
HEIGHT: 72 IN | HEART RATE: 76 BPM | BODY MASS INDEX: 32.78 KG/M2 | SYSTOLIC BLOOD PRESSURE: 132 MMHG | WEIGHT: 242 LBS | DIASTOLIC BLOOD PRESSURE: 84 MMHG

## 2018-05-15 DIAGNOSIS — F41.1 GENERALIZED ANXIETY DISORDER: Primary | ICD-10-CM

## 2018-05-15 DIAGNOSIS — F51.05 INSOMNIA DUE TO MENTAL CONDITION: ICD-10-CM

## 2018-05-15 DIAGNOSIS — F43.23 ADJUSTMENT DISORDER WITH MIXED ANXIETY AND DEPRESSED MOOD: ICD-10-CM

## 2018-05-15 PROCEDURE — 99214 OFFICE O/P EST MOD 30 MIN: CPT | Performed by: NURSE PRACTITIONER

## 2018-05-15 RX ORDER — AMITRIPTYLINE HYDROCHLORIDE 25 MG/1
TABLET, FILM COATED ORAL NIGHTLY
COMMUNITY
Start: 2018-04-21

## 2018-05-15 RX ORDER — DOXEPIN HYDROCHLORIDE 50 MG/1
CAPSULE ORAL
Qty: 60 CAPSULE | Refills: 0 | Status: SHIPPED | OUTPATIENT
Start: 2018-05-15

## 2018-05-15 RX ORDER — BUPROPION HYDROCHLORIDE 150 MG/1
150 TABLET ORAL EVERY MORNING
Qty: 30 TABLET | Refills: 0 | Status: SHIPPED | OUTPATIENT
Start: 2018-05-15 | End: 2019-05-15

## 2018-05-15 NOTE — PROGRESS NOTES
"  Subjective   Ritesh Azevedo is a 33 y.o. male is here today for medication management follow-up at the [facility].  He presents to his appointment on time     Chief Complaint: Depression    History of Present Illness  He states that he is not doing too well, they are having family problems.  \"everything is my fault and I can't do anything right\", having conflict with his wife.  She is always mad at him but doesn't talk to him.  He has medical issues that prevent him from working, he has recommended that she get a job to help out financially but also to be able to interact with others.  But she shows resented towards him for not being to work.  He is scheduled to have shoulder replaced and hopes to have a release from a pain.  He has been playing cards to help bring some money in.  He states that he believes that the doxepin has been helpful.  He states that he feels depressed; he describes being aggravated, irritated, and \"pissed off\"; rates his depression and anxiety 7/10 with 10 being the worse.  He shares that he wishes things would get better in his relationship but she keeps posted things on social media and all he wants to do it \"shut it down\".   Denies any AV hallucinations but has ringing in his ears.  Denies any SI/HI.  He states that his appetite has decreased and may be eating once per day.  Body mass index is 32.81 kg/m².  He is getting about 4 hours per night but shares that it is interrupted.  Will increase the wellbutrin to assist with depression, maintain doxepin.        The following portions of the patient's history were reviewed and updated as appropriate: allergies, current medications, past family history, past medical history, past social history, past surgical history and problem list.    Review of Systems   Constitutional: Negative for appetite change, chills, diaphoresis, fatigue, fever and unexpected weight change.   HENT: Negative for hearing loss, sore throat, trouble swallowing and " "voice change.    Eyes: Negative for photophobia and visual disturbance.   Respiratory: Negative for cough, chest tightness and shortness of breath.    Cardiovascular: Negative for chest pain and palpitations.   Gastrointestinal: Negative for abdominal pain, constipation, nausea and vomiting.   Endocrine: Negative for cold intolerance and heat intolerance.   Genitourinary: Negative for dysuria and frequency.          Kidney pain   Musculoskeletal: Negative for arthralgias, back pain, joint swelling and neck stiffness.        Shoulders and left knee   Skin: Negative for color change and wound.   Allergic/Immunologic: Negative for environmental allergies and immunocompromised state.   Neurological: Negative for dizziness, tremors, seizures, syncope, weakness, light-headedness and headaches.   Hematological: Negative for adenopathy. Does not bruise/bleed easily.     Objective   Physical Exam   Constitutional: He appears well-developed and well-nourished. No distress.   Genitourinary:   Genitourinary Comments:      Neurological: He is alert. Coordination and gait normal.   Vitals reviewed.    Blood pressure 132/84, pulse 76, height 182.9 cm (72.01\"), weight 110 kg (242 lb).    Medication List:   Current Outpatient Prescriptions   Medication Sig Dispense Refill   • amitriptyline (ELAVIL) 25 MG tablet Every Night.     • doxepin (SINEquan) 50 MG capsule Take 1-2 capsules by mouth every night at bedtime 60 capsule 0   • lisinopril (PRINIVIL,ZESTRIL) 20 MG tablet      • omeprazole (priLOSEC) 40 MG capsule Take 40 mg by mouth Daily.     • oxybutynin XL (DITROPAN-XL) 10 MG 24 hr tablet      • Testosterone Cypionate (DEPOTESTOTERONE CYPIONATE) 200 MG/ML injection Inject  into the shoulder, thigh, or buttocks Every 14 (Fourteen) Days.     • buPROPion XL (WELLBUTRIN XL) 150 MG 24 hr tablet Take 1 tablet by mouth Every Morning. 30 tablet 0     No current facility-administered medications for this visit.      Facility-Administered " Medications Ordered in Other Visits   Medication Dose Route Frequency Provider Last Rate Last Dose   • oxyCODONE-acetaminophen (PERCOCET) 5-325 MG per tablet 2 tablet  2 tablet Oral Q6H PRN Arnulfo Serrano MD           Mental Status Exam:   Hygiene:   fair  Cooperation:  Cooperative  Eye Contact:  Fair  Psychomotor Behavior:  Appropriate  Affect:  Appropriate  Hopelessness: Denies  Speech:  Normal  Thought Process:  Goal directed and Linear  Thought Content:  Mood congurent  Suicidal:  None  Homicidal:  None  Hallucinations:  None  Delusion:  None  Memory:  Intact  Orientation:  Person, Place, Time and Situation  Reliability:  fair  Insight:  Fair  Judgement:  Fair  Impulse Control:  Fair  Physical/Medical Issues:  Yes chronic pain    Assessment/Plan   Problems Addressed this Visit     None      Visit Diagnoses     Generalized anxiety disorder    -  Primary    Relevant Medications    amitriptyline (ELAVIL) 25 MG tablet    doxepin (SINEquan) 50 MG capsule    buPROPion XL (WELLBUTRIN XL) 150 MG 24 hr tablet    Adjustment disorder with mixed anxiety and depressed mood        Relevant Medications    amitriptyline (ELAVIL) 25 MG tablet    doxepin (SINEquan) 50 MG capsule    buPROPion XL (WELLBUTRIN XL) 150 MG 24 hr tablet    Insomnia due to mental condition        Relevant Medications    amitriptyline (ELAVIL) 25 MG tablet    doxepin (SINEquan) 50 MG capsule    buPROPion XL (WELLBUTRIN XL) 150 MG 24 hr tablet        Discussed medication options.   Continue Doxepine for sleep and wellbutrin for anxiety and depression.  Increase the wellbutrin to assist with depression.  Amitriptyline is prescribed per PCP.    Reviewed the risks, benefits, and side effects of the medications; patient acknowledged and verbally consented.  Patient is agreeable to call the [facility].  Patient is aware to call 911 or go to the nearest ER should begin having SI/HI.       Functionality: Fair. Depression seems to be impacting his  motivation and energy levels and social life.    Prognosis: Guarded dependent on medication, follow up appointment and treatment plan compliance       Return in 4 weeks

## 2018-05-18 ENCOUNTER — TRANSCRIBE ORDERS (OUTPATIENT)
Dept: ADMINISTRATIVE | Facility: HOSPITAL | Age: 33
End: 2018-05-18

## 2018-05-18 DIAGNOSIS — N20.0 KIDNEY STONES: ICD-10-CM

## 2018-05-18 DIAGNOSIS — N13.5 URETERAL STRICTURE: Primary | ICD-10-CM

## 2018-05-30 ENCOUNTER — HOSPITAL ENCOUNTER (OUTPATIENT)
Dept: CT IMAGING | Facility: HOSPITAL | Age: 33
Discharge: HOME | End: 2018-05-30
Admitting: UROLOGY

## 2018-05-30 DIAGNOSIS — N20.0 KIDNEY STONES: ICD-10-CM

## 2018-05-30 DIAGNOSIS — N13.5 URETERAL STRICTURE: ICD-10-CM

## 2018-05-30 PROCEDURE — 25010000002 IOPAMIDOL 61 % SOLUTION: Performed by: RADIOLOGY

## 2018-05-30 PROCEDURE — 74178 CT ABD&PLV WO CNTR FLWD CNTR: CPT

## 2018-05-30 PROCEDURE — 74178 CT ABD&PLV WO CNTR FLWD CNTR: CPT | Performed by: RADIOLOGY

## 2018-05-30 RX ADMIN — IOPAMIDOL 100 ML: 612 INJECTION, SOLUTION INTRAVENOUS at 09:45

## 2020-10-06 ENCOUNTER — TRANSCRIBE ORDERS (OUTPATIENT)
Dept: ADMINISTRATIVE | Facility: HOSPITAL | Age: 35
End: 2020-10-06

## 2020-10-06 DIAGNOSIS — Z96.611 PRESENCE OF RIGHT ARTIFICIAL SHOULDER JOINT: Primary | ICD-10-CM

## 2020-10-06 DIAGNOSIS — Z96.611 PRESENCE OF ARTIFICIAL SHOULDER JOINT, RIGHT: Primary | ICD-10-CM

## 2020-10-13 ENCOUNTER — APPOINTMENT (OUTPATIENT)
Dept: CT IMAGING | Facility: HOSPITAL | Age: 35
End: 2020-10-13

## 2020-10-13 ENCOUNTER — APPOINTMENT (OUTPATIENT)
Dept: GENERAL RADIOLOGY | Facility: HOSPITAL | Age: 35
End: 2020-10-13

## 2020-11-12 ENCOUNTER — HOSPITAL ENCOUNTER (OUTPATIENT)
Dept: CT IMAGING | Facility: HOSPITAL | Age: 35
Discharge: HOME | End: 2020-11-12

## 2020-11-12 ENCOUNTER — APPOINTMENT (OUTPATIENT)
Dept: CT IMAGING | Facility: HOSPITAL | Age: 35
End: 2020-11-12

## 2020-11-12 ENCOUNTER — HOSPITAL ENCOUNTER (OUTPATIENT)
Dept: GENERAL RADIOLOGY | Facility: HOSPITAL | Age: 35
Discharge: HOME | End: 2020-11-12

## 2020-11-12 DIAGNOSIS — Z96.611 PRESENCE OF RIGHT ARTIFICIAL SHOULDER JOINT: ICD-10-CM

## 2020-11-12 DIAGNOSIS — Z96.611 PRESENCE OF ARTIFICIAL SHOULDER JOINT, RIGHT: ICD-10-CM

## 2020-11-12 PROCEDURE — 77002 NEEDLE LOCALIZATION BY XRAY: CPT

## 2020-11-12 PROCEDURE — 25010000002 IOPAMIDOL 61 % SOLUTION: Performed by: ORTHOPAEDIC SURGERY

## 2020-11-12 PROCEDURE — 73202 CT UPPR EXTREMITY W/O&W/DYE: CPT

## 2020-11-12 RX ORDER — LIDOCAINE HYDROCHLORIDE 10 MG/ML
5 INJECTION, SOLUTION EPIDURAL; INFILTRATION; INTRACAUDAL; PERINEURAL ONCE
Status: COMPLETED | OUTPATIENT
Start: 2020-11-12 | End: 2020-11-12

## 2020-11-12 RX ADMIN — IOPAMIDOL 20 ML: 612 INJECTION, SOLUTION INTRAVENOUS at 12:08

## 2020-11-12 RX ADMIN — LIDOCAINE HYDROCHLORIDE 5 ML: 10 INJECTION, SOLUTION EPIDURAL; INFILTRATION; INTRACAUDAL; PERINEURAL at 12:06

## 2021-01-07 ENCOUNTER — TRANSCRIBE ORDERS (OUTPATIENT)
Dept: ADMINISTRATIVE | Facility: HOSPITAL | Age: 36
End: 2021-01-07

## 2021-01-07 DIAGNOSIS — Z01.818 PRE-OPERATIVE CLEARANCE: Primary | ICD-10-CM

## 2021-03-16 ENCOUNTER — BULK ORDERING (OUTPATIENT)
Dept: CASE MANAGEMENT | Facility: OTHER | Age: 36
End: 2021-03-16

## 2021-03-16 DIAGNOSIS — Z23 IMMUNIZATION DUE: ICD-10-CM

## 2021-03-19 ENCOUNTER — LAB (OUTPATIENT)
Dept: LAB | Facility: HOSPITAL | Age: 36
End: 2021-03-19

## 2021-03-19 ENCOUNTER — TRANSCRIBE ORDERS (OUTPATIENT)
Dept: LAB | Facility: HOSPITAL | Age: 36
End: 2021-03-19

## 2021-03-19 DIAGNOSIS — Z96.611 PRESENCE OF RIGHT ARTIFICIAL SHOULDER JOINT: ICD-10-CM

## 2021-03-19 DIAGNOSIS — M25.511 RIGHT SHOULDER PAIN, UNSPECIFIED CHRONICITY: Primary | ICD-10-CM

## 2021-03-19 PROCEDURE — 87075 CULTR BACTERIA EXCEPT BLOOD: CPT

## 2021-03-19 PROCEDURE — 87205 SMEAR GRAM STAIN: CPT

## 2021-03-19 PROCEDURE — 87070 CULTURE OTHR SPECIMN AEROBIC: CPT

## 2021-03-19 PROCEDURE — 87076 CULTURE ANAEROBE IDENT EACH: CPT

## 2021-03-19 PROCEDURE — 87176 TISSUE HOMOGENIZATION CULTR: CPT

## 2021-03-22 LAB
BACTERIA SPEC AEROBE CULT: NORMAL
GRAM STN SPEC: NORMAL
GRAM STN SPEC: NORMAL

## 2021-03-29 LAB
BACTERIA SPEC ANAEROBE CULT: ABNORMAL
BACTERIA SPEC ANAEROBE CULT: ABNORMAL

## 2021-04-16 ENCOUNTER — HOSPITAL ENCOUNTER (EMERGENCY)
Dept: HOSPITAL 79 - ER1 | Age: 36
Discharge: HOME | End: 2021-04-16
Payer: COMMERCIAL

## 2021-04-16 DIAGNOSIS — F17.290: ICD-10-CM

## 2021-04-16 DIAGNOSIS — M06.9: ICD-10-CM

## 2021-04-16 DIAGNOSIS — I10: ICD-10-CM

## 2021-04-16 DIAGNOSIS — R07.9: Primary | ICD-10-CM

## 2021-04-16 DIAGNOSIS — Z79.899: ICD-10-CM

## 2021-04-16 LAB
BUN/CREATININE RATIO: 16 (ref 0–10)
HGB BLD-MCNC: 16.6 GM/DL (ref 14–17.5)
RED BLOOD COUNT: 5.27 M/UL (ref 4.2–5.5)
WHITE BLOOD COUNT: 7.1 K/UL (ref 4.5–11)

## 2021-08-25 ENCOUNTER — HOSPITAL ENCOUNTER (OUTPATIENT)
Dept: HOSPITAL 79 - EXRD | Age: 36
End: 2021-08-25
Attending: INTERNAL MEDICINE
Payer: COMMERCIAL

## 2021-08-25 DIAGNOSIS — M54.9: Primary | ICD-10-CM

## 2021-08-25 DIAGNOSIS — G89.29: ICD-10-CM

## 2021-12-16 ENCOUNTER — HOSPITAL ENCOUNTER (EMERGENCY)
Dept: HOSPITAL 79 - ER1 | Age: 36
Discharge: HOME | End: 2021-12-16
Payer: COMMERCIAL

## 2021-12-16 DIAGNOSIS — N13.2: Primary | ICD-10-CM

## 2021-12-16 DIAGNOSIS — Z87.442: ICD-10-CM

## 2021-12-16 LAB
BUN/CREATININE RATIO: 13 (ref 0–10)
HGB BLD-MCNC: 15.1 GM/DL (ref 14–17.5)
RED BLOOD COUNT: 4.78 M/UL (ref 4.2–5.5)
WHITE BLOOD COUNT: 11.8 K/UL (ref 4.5–11)

## 2023-10-06 ENCOUNTER — APPOINTMENT (OUTPATIENT)
Dept: CT IMAGING | Facility: HOSPITAL | Age: 38
End: 2023-10-06
Payer: MEDICAID

## 2023-10-06 ENCOUNTER — HOSPITAL ENCOUNTER (OUTPATIENT)
Facility: HOSPITAL | Age: 38
Setting detail: OBSERVATION
Discharge: HOME | End: 2023-10-07
Attending: EMERGENCY MEDICINE | Admitting: INTERNAL MEDICINE
Payer: MEDICAID

## 2023-10-06 ENCOUNTER — APPOINTMENT (OUTPATIENT)
Dept: GENERAL RADIOLOGY | Facility: HOSPITAL | Age: 38
End: 2023-10-06
Payer: MEDICAID

## 2023-10-06 ENCOUNTER — APPOINTMENT (OUTPATIENT)
Dept: MRI IMAGING | Facility: HOSPITAL | Age: 38
End: 2023-10-06
Payer: MEDICAID

## 2023-10-06 DIAGNOSIS — R51.9 NONINTRACTABLE HEADACHE, UNSPECIFIED CHRONICITY PATTERN, UNSPECIFIED HEADACHE TYPE: ICD-10-CM

## 2023-10-06 DIAGNOSIS — R20.0 RIGHT FACIAL NUMBNESS: Primary | ICD-10-CM

## 2023-10-06 DIAGNOSIS — R20.0 NUMBNESS OF LEFT LOWER EXTREMITY: ICD-10-CM

## 2023-10-06 DIAGNOSIS — R07.89 ATYPICAL CHEST PAIN: ICD-10-CM

## 2023-10-06 DIAGNOSIS — M79.10 MYALGIA: ICD-10-CM

## 2023-10-06 DIAGNOSIS — B34.8 RHINOVIRUS INFECTION: ICD-10-CM

## 2023-10-06 DIAGNOSIS — R20.0 LEFT UPPER EXTREMITY NUMBNESS: ICD-10-CM

## 2023-10-06 LAB
ALBUMIN SERPL-MCNC: 4.7 G/DL (ref 3.5–5.2)
ALBUMIN/GLOB SERPL: 1.8 G/DL
ALP SERPL-CCNC: 89 U/L (ref 39–117)
ALT SERPL W P-5'-P-CCNC: 30 U/L (ref 1–41)
AMPHET+METHAMPHET UR QL: NEGATIVE
AMPHETAMINES UR QL: NEGATIVE
ANION GAP SERPL CALCULATED.3IONS-SCNC: 9 MMOL/L (ref 5–15)
APTT PPP: 30.8 SECONDS (ref 22–39)
AST SERPL-CCNC: 17 U/L (ref 1–40)
B PARAPERT DNA SPEC QL NAA+PROBE: NOT DETECTED
B PERT DNA SPEC QL NAA+PROBE: NOT DETECTED
BACTERIA UR QL AUTO: ABNORMAL /HPF
BARBITURATES UR QL SCN: NEGATIVE
BASOPHILS # BLD AUTO: 0.1 10*3/MM3 (ref 0–0.2)
BASOPHILS NFR BLD AUTO: 1.1 % (ref 0–1.5)
BENZODIAZ UR QL SCN: NEGATIVE
BILIRUB SERPL-MCNC: 0.3 MG/DL (ref 0–1.2)
BILIRUB UR QL STRIP: NEGATIVE
BUN BLDA-MCNC: 16 MG/DL
BUN SERPL-MCNC: 15 MG/DL (ref 6–20)
BUN/CREAT SERPL: 16.3 (ref 7–25)
BUPRENORPHINE SERPL-MCNC: NEGATIVE NG/ML
C PNEUM DNA NPH QL NAA+NON-PROBE: NOT DETECTED
CALCIUM BLD QL: 1.21 MG/DL
CALCIUM SPEC-SCNC: 9.4 MG/DL (ref 8.6–10.5)
CANNABINOIDS SERPL QL: NEGATIVE
CHLORIDE BLDA-SCNC: 99 MMOL/L (ref 98–109)
CHLORIDE SERPL-SCNC: 102 MMOL/L (ref 98–107)
CK SERPL-CCNC: 191 U/L (ref 20–200)
CLARITY UR: ABNORMAL
CO2 SERPL-SCNC: 29 MMOL/L (ref 22–29)
COCAINE UR QL: NEGATIVE
COLOR UR: YELLOW
CREAT BLDA-MCNC: 1 MG/DL
CREAT SERPL-MCNC: 0.92 MG/DL (ref 0.76–1.27)
D DIMER PPP FEU-MCNC: <0.27 MCGFEU/ML (ref 0–0.5)
D-LACTATE SERPL-SCNC: 1.5 MMOL/L (ref 0.5–2)
DEPRECATED RDW RBC AUTO: 44.7 FL (ref 37–54)
EGFRCR SERPLBLD CKD-EPI 2021: 109.2 ML/MIN/1.73
EOSINOPHIL # BLD AUTO: 0.28 10*3/MM3 (ref 0–0.4)
EOSINOPHIL NFR BLD AUTO: 3.1 % (ref 0.3–6.2)
ERYTHROCYTE [DISTWIDTH] IN BLOOD BY AUTOMATED COUNT: 13 % (ref 12.3–15.4)
ETHANOL BLD-MCNC: <10 MG/DL (ref 0–10)
FENTANYL UR-MCNC: NEGATIVE NG/ML
FLUAV SUBTYP SPEC NAA+PROBE: NOT DETECTED
FLUBV RNA ISLT QL NAA+PROBE: NOT DETECTED
GLOBULIN UR ELPH-MCNC: 2.6 GM/DL
GLUCOSE SERPL-MCNC: 100 MG/DL (ref 65–99)
GLUCOSE UR STRIP-MCNC: NEGATIVE MG/DL
HADV DNA SPEC NAA+PROBE: NOT DETECTED
HCOV 229E RNA SPEC QL NAA+PROBE: NOT DETECTED
HCOV HKU1 RNA SPEC QL NAA+PROBE: NOT DETECTED
HCOV NL63 RNA SPEC QL NAA+PROBE: NOT DETECTED
HCOV OC43 RNA SPEC QL NAA+PROBE: NOT DETECTED
HCT VFR BLD AUTO: 43 % (ref 37.5–51)
HGB BLD-MCNC: 13.9 G/DL (ref 13–17.7)
HGB BLDA-MCNC: 12.9 G/DL (ref 12–17)
HGB UR QL STRIP.AUTO: NEGATIVE
HMPV RNA NPH QL NAA+NON-PROBE: NOT DETECTED
HOLD SPECIMEN: NORMAL
HOLD SPECIMEN: NORMAL
HPIV1 RNA ISLT QL NAA+PROBE: NOT DETECTED
HPIV2 RNA SPEC QL NAA+PROBE: NOT DETECTED
HPIV3 RNA NPH QL NAA+PROBE: NOT DETECTED
HPIV4 P GENE NPH QL NAA+PROBE: NOT DETECTED
HYALINE CASTS UR QL AUTO: ABNORMAL /LPF
IMM GRANULOCYTES # BLD AUTO: 0.15 10*3/MM3 (ref 0–0.05)
IMM GRANULOCYTES NFR BLD AUTO: 1.7 % (ref 0–0.5)
INR PPP: 1.2 (ref 0.8–1.2)
KETONES UR QL STRIP: NEGATIVE
LEUKOCYTE ESTERASE UR QL STRIP.AUTO: NEGATIVE
LYMPHOCYTES # BLD AUTO: 2.07 10*3/MM3 (ref 0.7–3.1)
LYMPHOCYTES NFR BLD AUTO: 22.8 % (ref 19.6–45.3)
M PNEUMO IGG SER IA-ACNC: NOT DETECTED
MAGNESIUM SERPL-MCNC: 2.3 MG/DL (ref 1.6–2.6)
MCH RBC QN AUTO: 30.5 PG (ref 26.6–33)
MCHC RBC AUTO-ENTMCNC: 32.3 G/DL (ref 31.5–35.7)
MCV RBC AUTO: 94.3 FL (ref 79–97)
METHADONE UR QL SCN: NEGATIVE
MONOCYTES # BLD AUTO: 0.71 10*3/MM3 (ref 0.1–0.9)
MONOCYTES NFR BLD AUTO: 7.8 % (ref 5–12)
NEUTROPHILS NFR BLD AUTO: 5.78 10*3/MM3 (ref 1.7–7)
NEUTROPHILS NFR BLD AUTO: 63.5 % (ref 42.7–76)
NITRITE UR QL STRIP: NEGATIVE
NRBC BLD AUTO-RTO: 0 /100 WBC (ref 0–0.2)
NT-PROBNP SERPL-MCNC: <36 PG/ML (ref 0–450)
OPIATES UR QL: NEGATIVE
OXYCODONE UR QL SCN: POSITIVE
PCP UR QL SCN: NEGATIVE
PH UR STRIP.AUTO: 5.5 [PH] (ref 5–8)
PLATELET # BLD AUTO: 293 10*3/MM3 (ref 140–450)
PMV BLD AUTO: 10.5 FL (ref 6–12)
POTASSIUM BLDA-SCNC: 4 MMOL/L (ref 3.5–4.9)
POTASSIUM SERPL-SCNC: 4.3 MMOL/L (ref 3.5–5.2)
PROPOXYPH UR QL: NEGATIVE
PROT SERPL-MCNC: 7.3 G/DL (ref 6–8.5)
PROT UR QL STRIP: NEGATIVE
PROTHROMBIN TIME: 14.5 SECONDS
PROTHROMBIN TIME: 14.5 SECONDS (ref 12.8–15.2)
RBC # BLD AUTO: 4.56 10*6/MM3 (ref 4.14–5.8)
RBC # UR STRIP: ABNORMAL /HPF
REF LAB TEST METHOD: ABNORMAL
RHINOVIRUS RNA SPEC NAA+PROBE: DETECTED
RSV RNA NPH QL NAA+NON-PROBE: NOT DETECTED
SARS-COV-2 RNA NPH QL NAA+NON-PROBE: NOT DETECTED
SODIUM BLD-SCNC: 139 MMOL/L (ref 138–146)
SODIUM SERPL-SCNC: 140 MMOL/L (ref 136–145)
SP GR UR STRIP: 1.03 (ref 1–1.03)
SQUAMOUS #/AREA URNS HPF: ABNORMAL /HPF
TRICYCLICS UR QL SCN: POSITIVE
TROPONIN T SERPL HS-MCNC: 7 NG/L
TSH SERPL DL<=0.05 MIU/L-ACNC: 2.31 UIU/ML (ref 0.27–4.2)
UROBILINOGEN UR QL STRIP: ABNORMAL
WBC # UR STRIP: ABNORMAL /HPF
WBC NRBC COR # BLD: 9.09 10*3/MM3 (ref 3.4–10.8)
WHOLE BLOOD HOLD COAG: NORMAL
WHOLE BLOOD HOLD SPECIMEN: NORMAL

## 2023-10-06 PROCEDURE — 84484 ASSAY OF TROPONIN QUANT: CPT | Performed by: EMERGENCY MEDICINE

## 2023-10-06 PROCEDURE — 85730 THROMBOPLASTIN TIME PARTIAL: CPT | Performed by: EMERGENCY MEDICINE

## 2023-10-06 PROCEDURE — 70450 CT HEAD/BRAIN W/O DYE: CPT

## 2023-10-06 PROCEDURE — 83735 ASSAY OF MAGNESIUM: CPT | Performed by: EMERGENCY MEDICINE

## 2023-10-06 PROCEDURE — 80050 GENERAL HEALTH PANEL: CPT | Performed by: EMERGENCY MEDICINE

## 2023-10-06 PROCEDURE — 83880 ASSAY OF NATRIURETIC PEPTIDE: CPT | Performed by: EMERGENCY MEDICINE

## 2023-10-06 PROCEDURE — 0202U NFCT DS 22 TRGT SARS-COV-2: CPT | Performed by: EMERGENCY MEDICINE

## 2023-10-06 PROCEDURE — 71045 X-RAY EXAM CHEST 1 VIEW: CPT

## 2023-10-06 PROCEDURE — 81001 URINALYSIS AUTO W/SCOPE: CPT | Performed by: EMERGENCY MEDICINE

## 2023-10-06 PROCEDURE — 82550 ASSAY OF CK (CPK): CPT | Performed by: EMERGENCY MEDICINE

## 2023-10-06 PROCEDURE — 83605 ASSAY OF LACTIC ACID: CPT | Performed by: EMERGENCY MEDICINE

## 2023-10-06 PROCEDURE — 0042T HC CT CEREBRAL PERFUSION W/WO CONTRAST: CPT

## 2023-10-06 PROCEDURE — 70551 MRI BRAIN STEM W/O DYE: CPT

## 2023-10-06 PROCEDURE — 85610 PROTHROMBIN TIME: CPT

## 2023-10-06 PROCEDURE — 25510000001 IOPAMIDOL PER 1 ML: Performed by: EMERGENCY MEDICINE

## 2023-10-06 PROCEDURE — 99285 EMERGENCY DEPT VISIT HI MDM: CPT

## 2023-10-06 PROCEDURE — 93005 ELECTROCARDIOGRAM TRACING: CPT | Performed by: EMERGENCY MEDICINE

## 2023-10-06 PROCEDURE — 82077 ASSAY SPEC XCP UR&BREATH IA: CPT | Performed by: EMERGENCY MEDICINE

## 2023-10-06 PROCEDURE — 80307 DRUG TEST PRSMV CHEM ANLYZR: CPT | Performed by: EMERGENCY MEDICINE

## 2023-10-06 PROCEDURE — 84443 ASSAY THYROID STIM HORMONE: CPT | Performed by: INTERNAL MEDICINE

## 2023-10-06 PROCEDURE — 70496 CT ANGIOGRAPHY HEAD: CPT

## 2023-10-06 PROCEDURE — 70498 CT ANGIOGRAPHY NECK: CPT

## 2023-10-06 PROCEDURE — 85379 FIBRIN DEGRADATION QUANT: CPT | Performed by: EMERGENCY MEDICINE

## 2023-10-06 RX ORDER — MAGNESIUM SULFATE 1 G/100ML
1 INJECTION INTRAVENOUS ONCE
Status: COMPLETED | OUTPATIENT
Start: 2023-10-06 | End: 2023-10-07

## 2023-10-06 RX ORDER — ASPIRIN 81 MG/1
81 TABLET, CHEWABLE ORAL DAILY
Status: DISCONTINUED | OUTPATIENT
Start: 2023-10-07 | End: 2023-10-06

## 2023-10-06 RX ORDER — SODIUM CHLORIDE 0.9 % (FLUSH) 0.9 %
10 SYRINGE (ML) INJECTION EVERY 12 HOURS SCHEDULED
Status: DISCONTINUED | OUTPATIENT
Start: 2023-10-06 | End: 2023-10-07 | Stop reason: SDUPTHER

## 2023-10-06 RX ORDER — SODIUM CHLORIDE 0.9 % (FLUSH) 0.9 %
10 SYRINGE (ML) INJECTION AS NEEDED
Status: DISCONTINUED | OUTPATIENT
Start: 2023-10-06 | End: 2023-10-07 | Stop reason: HOSPADM

## 2023-10-06 RX ORDER — CLOPIDOGREL BISULFATE 75 MG/1
300 TABLET ORAL ONCE
Status: DISCONTINUED | OUTPATIENT
Start: 2023-10-07 | End: 2023-10-07 | Stop reason: HOSPADM

## 2023-10-06 RX ORDER — SODIUM CHLORIDE 0.9 % (FLUSH) 0.9 %
10 SYRINGE (ML) INJECTION AS NEEDED
Status: DISCONTINUED | OUTPATIENT
Start: 2023-10-06 | End: 2023-10-07 | Stop reason: SDUPTHER

## 2023-10-06 RX ORDER — SODIUM CHLORIDE 9 MG/ML
1000 INJECTION, SOLUTION INTRAVENOUS ONCE
Status: COMPLETED | OUTPATIENT
Start: 2023-10-06 | End: 2023-10-07

## 2023-10-06 RX ORDER — ASPIRIN 300 MG/1
300 SUPPOSITORY RECTAL DAILY
Status: DISCONTINUED | OUTPATIENT
Start: 2023-10-07 | End: 2023-10-06

## 2023-10-06 RX ORDER — SODIUM CHLORIDE 9 MG/ML
40 INJECTION, SOLUTION INTRAVENOUS AS NEEDED
Status: DISCONTINUED | OUTPATIENT
Start: 2023-10-06 | End: 2023-10-07 | Stop reason: SDUPTHER

## 2023-10-06 RX ORDER — CLOPIDOGREL BISULFATE 75 MG/1
75 TABLET ORAL DAILY
Status: DISCONTINUED | OUTPATIENT
Start: 2023-10-07 | End: 2023-10-07 | Stop reason: HOSPADM

## 2023-10-06 RX ORDER — ATORVASTATIN CALCIUM 40 MG/1
80 TABLET, FILM COATED ORAL NIGHTLY
Status: DISCONTINUED | OUTPATIENT
Start: 2023-10-06 | End: 2023-10-07 | Stop reason: HOSPADM

## 2023-10-06 RX ADMIN — IOPAMIDOL 115 ML: 755 INJECTION, SOLUTION INTRAVENOUS at 23:45

## 2023-10-07 ENCOUNTER — APPOINTMENT (OUTPATIENT)
Dept: CARDIOLOGY | Facility: HOSPITAL | Age: 38
End: 2023-10-07
Payer: MEDICAID

## 2023-10-07 VITALS
BODY MASS INDEX: 27.62 KG/M2 | TEMPERATURE: 98.3 F | SYSTOLIC BLOOD PRESSURE: 114 MMHG | DIASTOLIC BLOOD PRESSURE: 74 MMHG | OXYGEN SATURATION: 95 % | HEIGHT: 72 IN | RESPIRATION RATE: 18 BRPM | WEIGHT: 203.93 LBS | HEART RATE: 71 BPM

## 2023-10-07 PROBLEM — R20.0 RIGHT FACIAL NUMBNESS: Status: ACTIVE | Noted: 2023-10-07

## 2023-10-07 LAB
ALBUMIN SERPL-MCNC: 4.1 G/DL (ref 3.5–5.2)
ALBUMIN/GLOB SERPL: 1.8 G/DL
ALP SERPL-CCNC: 79 U/L (ref 39–117)
ALT SERPL W P-5'-P-CCNC: 26 U/L (ref 1–41)
ANION GAP SERPL CALCULATED.3IONS-SCNC: 8 MMOL/L (ref 5–15)
APPEARANCE CSF: CLEAR
AST SERPL-CCNC: 15 U/L (ref 1–40)
BASOPHILS # BLD AUTO: 0.08 10*3/MM3 (ref 0–0.2)
BASOPHILS NFR BLD AUTO: 0.9 % (ref 0–1.5)
BH CV ECHO MEAS - AO MAX PG: 3.7 MMHG
BH CV ECHO MEAS - AO MEAN PG: 2 MMHG
BH CV ECHO MEAS - AO ROOT DIAM: 3.8 CM
BH CV ECHO MEAS - AO V2 MAX: 96.2 CM/SEC
BH CV ECHO MEAS - AO V2 VTI: 20.4 CM
BH CV ECHO MEAS - AVA(I,D): 2.6 CM2
BH CV ECHO MEAS - EDV(CUBED): 85.2 ML
BH CV ECHO MEAS - EDV(MOD-SP2): 96.1 ML
BH CV ECHO MEAS - EDV(MOD-SP4): 115 ML
BH CV ECHO MEAS - EF(MOD-BP): 50 %
BH CV ECHO MEAS - EF(MOD-SP2): 43.5 %
BH CV ECHO MEAS - EF(MOD-SP4): 55.4 %
BH CV ECHO MEAS - ESV(CUBED): 32.8 ML
BH CV ECHO MEAS - ESV(MOD-SP2): 54.3 ML
BH CV ECHO MEAS - ESV(MOD-SP4): 51.3 ML
BH CV ECHO MEAS - FS: 27.3 %
BH CV ECHO MEAS - IVS/LVPW: 1 CM
BH CV ECHO MEAS - IVSD: 1 CM
BH CV ECHO MEAS - LA DIMENSION: 3.1 CM
BH CV ECHO MEAS - LAT PEAK E' VEL: 12.7 CM/SEC
BH CV ECHO MEAS - LV MASS(C)D: 191.3 GRAMS
BH CV ECHO MEAS - LV MAX PG: 2.9 MMHG
BH CV ECHO MEAS - LV MEAN PG: 2 MMHG
BH CV ECHO MEAS - LV V1 MAX: 84.6 CM/SEC
BH CV ECHO MEAS - LV V1 VTI: 17.1 CM
BH CV ECHO MEAS - LVIDD: 4.4 CM
BH CV ECHO MEAS - LVIDS: 3.2 CM
BH CV ECHO MEAS - LVOT AREA: 3.1 CM2
BH CV ECHO MEAS - LVOT DIAM: 2 CM
BH CV ECHO MEAS - LVPWD: 1 CM
BH CV ECHO MEAS - MED PEAK E' VEL: 7.8 CM/SEC
BH CV ECHO MEAS - MV A MAX VEL: 68.1 CM/SEC
BH CV ECHO MEAS - MV DEC TIME: 0.17 SEC
BH CV ECHO MEAS - MV E MAX VEL: 54 CM/SEC
BH CV ECHO MEAS - MV E/A: 0.79
BH CV ECHO MEAS - PA ACC TIME: 0.16 SEC
BH CV ECHO MEAS - PA V2 MAX: 65.8 CM/SEC
BH CV ECHO MEAS - SV(LVOT): 53.7 ML
BH CV ECHO MEAS - SV(MOD-SP2): 41.8 ML
BH CV ECHO MEAS - SV(MOD-SP4): 63.7 ML
BH CV ECHO MEAS - TAPSE (>1.6): 1.96 CM
BH CV ECHO MEASUREMENTS AVERAGE E/E' RATIO: 5.27
BH CV ECHO SHUNT ASSESSMENT PERFORMED (HIDDEN SCRIPTING): 1
BH CV VAS BP LEFT ARM: NORMAL MMHG
BH CV XLRA - TDI S': 12 CM/SEC
BILIRUB SERPL-MCNC: 0.3 MG/DL (ref 0–1.2)
BUN SERPL-MCNC: 12 MG/DL (ref 6–20)
BUN/CREAT SERPL: 14.3 (ref 7–25)
CALCIUM SPEC-SCNC: 9.1 MG/DL (ref 8.6–10.5)
CHLORIDE SERPL-SCNC: 100 MMOL/L (ref 98–107)
CHOLEST SERPL-MCNC: 188 MG/DL (ref 0–200)
CO2 SERPL-SCNC: 29 MMOL/L (ref 22–29)
COLOR CSF: COLORLESS
COLOR SPUN CSF: COLORLESS
CREAT SERPL-MCNC: 0.84 MG/DL (ref 0.76–1.27)
DEPRECATED RDW RBC AUTO: 43.7 FL (ref 37–54)
EGFRCR SERPLBLD CKD-EPI 2021: 114.5 ML/MIN/1.73
EOSINOPHIL # BLD AUTO: 0.31 10*3/MM3 (ref 0–0.4)
EOSINOPHIL NFR BLD AUTO: 3.5 % (ref 0.3–6.2)
ERYTHROCYTE [DISTWIDTH] IN BLOOD BY AUTOMATED COUNT: 12.8 % (ref 12.3–15.4)
ETHANOL BLD-MCNC: <10 MG/DL (ref 0–10)
ETHANOL BLD-MCNC: <10 MG/DL (ref 0–10)
GEN 5 2HR TROPONIN T REFLEX: 10 NG/L
GLOBULIN UR ELPH-MCNC: 2.3 GM/DL
GLUCOSE BLDC GLUCOMTR-MCNC: 95 MG/DL (ref 70–130)
GLUCOSE CSF-MCNC: 72 MG/DL (ref 40–70)
GLUCOSE SERPL-MCNC: 114 MG/DL (ref 65–99)
HBA1C MFR BLD: 5.6 % (ref 4.8–5.6)
HCT VFR BLD AUTO: 41.6 % (ref 37.5–51)
HDLC SERPL-MCNC: 32 MG/DL (ref 40–60)
HGB BLD-MCNC: 13.7 G/DL (ref 13–17.7)
HOLD SPECIMEN: NORMAL
IMM GRANULOCYTES # BLD AUTO: 0.04 10*3/MM3 (ref 0–0.05)
IMM GRANULOCYTES NFR BLD AUTO: 0.5 % (ref 0–0.5)
LDLC SERPL CALC-MCNC: 112 MG/DL (ref 0–100)
LDLC/HDLC SERPL: 3.31 {RATIO}
LEFT ATRIUM VOLUME INDEX: 22.6 ML/M2
LV EF 2D ECHO EST: 55 %
LYMPHOCYTES # BLD AUTO: 1.85 10*3/MM3 (ref 0.7–3.1)
LYMPHOCYTES NFR BLD AUTO: 21.1 % (ref 19.6–45.3)
MAGNESIUM SERPL-MCNC: 2.5 MG/DL (ref 1.6–2.6)
MCH RBC QN AUTO: 30.5 PG (ref 26.6–33)
MCHC RBC AUTO-ENTMCNC: 32.9 G/DL (ref 31.5–35.7)
MCV RBC AUTO: 92.7 FL (ref 79–97)
MONOCYTES # BLD AUTO: 0.68 10*3/MM3 (ref 0.1–0.9)
MONOCYTES NFR BLD AUTO: 7.8 % (ref 5–12)
NEUTROPHILS NFR BLD AUTO: 5.79 10*3/MM3 (ref 1.7–7)
NEUTROPHILS NFR BLD AUTO: 66.2 % (ref 42.7–76)
NRBC BLD AUTO-RTO: 0 /100 WBC (ref 0–0.2)
PLATELET # BLD AUTO: 254 10*3/MM3 (ref 140–450)
PMV BLD AUTO: 10.8 FL (ref 6–12)
POTASSIUM SERPL-SCNC: 4.3 MMOL/L (ref 3.5–5.2)
PROT CSF-MCNC: 51.6 MG/DL (ref 15–45)
PROT SERPL-MCNC: 6.4 G/DL (ref 6–8.5)
RBC # BLD AUTO: 4.49 10*6/MM3 (ref 4.14–5.8)
RBC # CSF MANUAL: 0 /MM3 (ref 0–5)
SODIUM SERPL-SCNC: 137 MMOL/L (ref 136–145)
SPECIMEN VOL CSF: 6 ML
TRIGL SERPL-MCNC: 250 MG/DL (ref 0–150)
TROPONIN T DELTA: NORMAL
TROPONIN T SERPL HS-MCNC: <6 NG/L
TSH SERPL DL<=0.05 MIU/L-ACNC: 2.31 UIU/ML (ref 0.27–4.2)
TUBE # CSF: 2
VLDLC SERPL-MCNC: 44 MG/DL (ref 5–40)
WBC # CSF MANUAL: 1 /MM3 (ref 0–5)
WBC NRBC COR # BLD: 8.75 10*3/MM3 (ref 3.4–10.8)

## 2023-10-07 PROCEDURE — 96375 TX/PRO/DX INJ NEW DRUG ADDON: CPT

## 2023-10-07 PROCEDURE — 93306 TTE W/DOPPLER COMPLETE: CPT | Performed by: INTERNAL MEDICINE

## 2023-10-07 PROCEDURE — 82077 ASSAY SPEC XCP UR&BREATH IA: CPT | Performed by: INTERNAL MEDICINE

## 2023-10-07 PROCEDURE — 82945 GLUCOSE OTHER FLUID: CPT | Performed by: INTERNAL MEDICINE

## 2023-10-07 PROCEDURE — 85025 COMPLETE CBC W/AUTO DIFF WBC: CPT | Performed by: INTERNAL MEDICINE

## 2023-10-07 PROCEDURE — 96365 THER/PROPH/DIAG IV INF INIT: CPT

## 2023-10-07 PROCEDURE — 87015 SPECIMEN INFECT AGNT CONCNTJ: CPT | Performed by: EMERGENCY MEDICINE

## 2023-10-07 PROCEDURE — 80053 COMPREHEN METABOLIC PANEL: CPT | Performed by: INTERNAL MEDICINE

## 2023-10-07 PROCEDURE — G0378 HOSPITAL OBSERVATION PER HR: HCPCS

## 2023-10-07 PROCEDURE — 87205 SMEAR GRAM STAIN: CPT | Performed by: EMERGENCY MEDICINE

## 2023-10-07 PROCEDURE — 93306 TTE W/DOPPLER COMPLETE: CPT

## 2023-10-07 PROCEDURE — 87070 CULTURE OTHR SPECIMN AEROBIC: CPT | Performed by: EMERGENCY MEDICINE

## 2023-10-07 PROCEDURE — 83735 ASSAY OF MAGNESIUM: CPT | Performed by: INTERNAL MEDICINE

## 2023-10-07 PROCEDURE — 80061 LIPID PANEL: CPT | Performed by: NURSE PRACTITIONER

## 2023-10-07 PROCEDURE — 25810000003 SODIUM CHLORIDE 0.9 % SOLUTION: Performed by: EMERGENCY MEDICINE

## 2023-10-07 PROCEDURE — 82077 ASSAY SPEC XCP UR&BREATH IA: CPT | Performed by: NURSE PRACTITIONER

## 2023-10-07 PROCEDURE — 82948 REAGENT STRIP/BLOOD GLUCOSE: CPT

## 2023-10-07 PROCEDURE — 83036 HEMOGLOBIN GLYCOSYLATED A1C: CPT | Performed by: NURSE PRACTITIONER

## 2023-10-07 PROCEDURE — 25010000002 ONDANSETRON PER 1 MG: Performed by: INTERNAL MEDICINE

## 2023-10-07 PROCEDURE — 89050 BODY FLUID CELL COUNT: CPT | Performed by: EMERGENCY MEDICINE

## 2023-10-07 PROCEDURE — 84157 ASSAY OF PROTEIN OTHER: CPT | Performed by: INTERNAL MEDICINE

## 2023-10-07 PROCEDURE — 25010000002 MAGNESIUM SULFATE IN D5W 1G/100ML (PREMIX) 1-5 GM/100ML-% SOLUTION: Performed by: NURSE PRACTITIONER

## 2023-10-07 PROCEDURE — 96366 THER/PROPH/DIAG IV INF ADDON: CPT

## 2023-10-07 PROCEDURE — 84484 ASSAY OF TROPONIN QUANT: CPT | Performed by: INTERNAL MEDICINE

## 2023-10-07 RX ORDER — NITROGLYCERIN 0.4 MG/1
0.4 TABLET SUBLINGUAL
Status: DISCONTINUED | OUTPATIENT
Start: 2023-10-07 | End: 2023-10-07 | Stop reason: HOSPADM

## 2023-10-07 RX ORDER — PANTOPRAZOLE SODIUM 40 MG/1
40 TABLET, DELAYED RELEASE ORAL DAILY
COMMUNITY
Start: 2023-07-25

## 2023-10-07 RX ORDER — LEVOCETIRIZINE DIHYDROCHLORIDE 5 MG/1
5 TABLET, FILM COATED ORAL EVERY EVENING
COMMUNITY
Start: 2023-07-24 | End: 2023-10-07 | Stop reason: HOSPADM

## 2023-10-07 RX ORDER — GABAPENTIN 800 MG/1
1 TABLET ORAL 3 TIMES DAILY
COMMUNITY
Start: 2023-08-11

## 2023-10-07 RX ORDER — ASPIRIN 81 MG/1
81 TABLET ORAL DAILY
COMMUNITY

## 2023-10-07 RX ORDER — ATORVASTATIN CALCIUM 20 MG/1
20 TABLET, FILM COATED ORAL DAILY
COMMUNITY
End: 2023-10-07 | Stop reason: HOSPADM

## 2023-10-07 RX ORDER — SODIUM CHLORIDE 9 MG/ML
40 INJECTION, SOLUTION INTRAVENOUS AS NEEDED
Status: DISCONTINUED | OUTPATIENT
Start: 2023-10-07 | End: 2023-10-07 | Stop reason: HOSPADM

## 2023-10-07 RX ORDER — HYDROCHLOROTHIAZIDE 25 MG/1
25 TABLET ORAL DAILY
COMMUNITY
End: 2023-10-07 | Stop reason: HOSPADM

## 2023-10-07 RX ORDER — ACETAMINOPHEN 325 MG/1
650 TABLET ORAL EVERY 4 HOURS PRN
Status: DISCONTINUED | OUTPATIENT
Start: 2023-10-07 | End: 2023-10-07 | Stop reason: HOSPADM

## 2023-10-07 RX ORDER — ROPINIROLE 4 MG/1
4 TABLET, FILM COATED ORAL NIGHTLY
COMMUNITY
Start: 2023-07-31 | End: 2024-09-17

## 2023-10-07 RX ORDER — DOXEPIN HYDROCHLORIDE 50 MG/1
50 CAPSULE ORAL NIGHTLY PRN
Status: DISCONTINUED | OUTPATIENT
Start: 2023-10-07 | End: 2023-10-07 | Stop reason: HOSPADM

## 2023-10-07 RX ORDER — CROMOLYN SODIUM 5.2 MG
1 AEROSOL, SPRAY WITH PUMP (ML) NASAL 4 TIMES DAILY
COMMUNITY

## 2023-10-07 RX ORDER — GUANFACINE 1 MG/1
4 TABLET ORAL NIGHTLY
COMMUNITY
End: 2023-10-07 | Stop reason: HOSPADM

## 2023-10-07 RX ORDER — TAMSULOSIN HYDROCHLORIDE 0.4 MG/1
0.8 CAPSULE ORAL DAILY
COMMUNITY
Start: 2023-08-14 | End: 2024-09-17

## 2023-10-07 RX ORDER — OXYCODONE HYDROCHLORIDE 15 MG/1
15 TABLET ORAL EVERY 6 HOURS PRN
COMMUNITY
Start: 2023-09-25

## 2023-10-07 RX ORDER — QUETIAPINE FUMARATE 50 MG/1
50 TABLET, FILM COATED ORAL NIGHTLY
COMMUNITY
Start: 2023-08-08 | End: 2023-10-07 | Stop reason: HOSPADM

## 2023-10-07 RX ORDER — LISINOPRIL 20 MG/1
20 TABLET ORAL
Status: DISCONTINUED | OUTPATIENT
Start: 2023-10-07 | End: 2023-10-07 | Stop reason: HOSPADM

## 2023-10-07 RX ORDER — AMOXICILLIN 250 MG
1 CAPSULE ORAL DAILY
COMMUNITY

## 2023-10-07 RX ORDER — SODIUM CHLORIDE 0.9 % (FLUSH) 0.9 %
10 SYRINGE (ML) INJECTION EVERY 12 HOURS SCHEDULED
Status: DISCONTINUED | OUTPATIENT
Start: 2023-10-07 | End: 2023-10-07 | Stop reason: HOSPADM

## 2023-10-07 RX ORDER — HYDROCODONE BITARTRATE AND ACETAMINOPHEN 5; 325 MG/1; MG/1
1 TABLET ORAL EVERY 4 HOURS PRN
Status: DISCONTINUED | OUTPATIENT
Start: 2023-10-07 | End: 2023-10-07 | Stop reason: HOSPADM

## 2023-10-07 RX ORDER — SODIUM CHLORIDE 0.9 % (FLUSH) 0.9 %
10 SYRINGE (ML) INJECTION AS NEEDED
Status: DISCONTINUED | OUTPATIENT
Start: 2023-10-07 | End: 2023-10-07 | Stop reason: HOSPADM

## 2023-10-07 RX ORDER — PHENTERMINE HYDROCHLORIDE 37.5 MG/1
37.5 TABLET ORAL DAILY
COMMUNITY
Start: 2023-08-22

## 2023-10-07 RX ORDER — AMITRIPTYLINE HYDROCHLORIDE 25 MG/1
25 TABLET, FILM COATED ORAL NIGHTLY
Status: DISCONTINUED | OUTPATIENT
Start: 2023-10-07 | End: 2023-10-07 | Stop reason: HOSPADM

## 2023-10-07 RX ORDER — ONDANSETRON 2 MG/ML
4 INJECTION INTRAMUSCULAR; INTRAVENOUS EVERY 6 HOURS PRN
Status: DISCONTINUED | OUTPATIENT
Start: 2023-10-07 | End: 2023-10-07 | Stop reason: HOSPADM

## 2023-10-07 RX ORDER — TESTOSTERONE 16.2 MG/G
GEL TRANSDERMAL
COMMUNITY
Start: 2023-07-02 | End: 2023-10-07 | Stop reason: HOSPADM

## 2023-10-07 RX ORDER — NORTRIPTYLINE HYDROCHLORIDE 25 MG/1
50 CAPSULE ORAL 2 TIMES DAILY
COMMUNITY
Start: 2023-08-19

## 2023-10-07 RX ORDER — LEVOTHYROXINE SODIUM 0.07 MG/1
75 TABLET ORAL DAILY
COMMUNITY
Start: 2023-06-22 | End: 2024-09-17

## 2023-10-07 RX ADMIN — ONDANSETRON 4 MG: 2 INJECTION INTRAMUSCULAR; INTRAVENOUS at 04:00

## 2023-10-07 RX ADMIN — Medication 10 ML: at 04:01

## 2023-10-07 RX ADMIN — MAGNESIUM SULFATE HEPTAHYDRATE 1 G: 1 INJECTION, SOLUTION INTRAVENOUS at 00:38

## 2023-10-07 RX ADMIN — SODIUM CHLORIDE 1000 ML: 9 INJECTION, SOLUTION INTRAVENOUS at 00:38

## 2023-10-07 NOTE — ED PROVIDER NOTES
"Subjective   History of Present Illness  38-year-old male presents emergency department accompanied by his significant other with concerns about right lower facial numbness which he noticed when he awoke at 6 AM today.  He states it has improved since onset, however persists currently.  He is not sure what time he went to bed last night, in terms of his last known well time from his acute symptoms.  However, he has had left-sided numbness since September 7 and was hospitalized for 2 weeks at [facility].  He followed up with neurology specialist who diagnosed the patient with sleep paralysis and sleep parasomnia.  The patient's primary care provider wanted a lumbar puncture performed on the patient to rule out Guillain-Barré syndrome, as the patient had reported paralysis from the neck down last month when he was hospitalized.  However, no lumbar puncture was performed.  The patient has never been to our facility before, but presents today requesting the lumbar puncture be performed, and also has other complaints.  He reports decreased urine output for the last day, and states that he had urinary retention about 3 weeks ago at [facility] requiring urinary catheter placement.  Intermittently throughout the day he has had \"slower \"speech per his significant other.  The patient reports that he is amnestic to events from the last couple of days, and the symptoms appear to be intermittent.  He has been awake but having tremors to all 4 extremities intermittently.  He said that he had an EEG performed previously, but is not sure of the result.  He reports generalized body aches, which he describes as \"aching pain in my bones.\"  He states that this has worsened to both hips, hands, and feet.  He also reports some chest pain today which was severe with associated nausea.  He has a history of hypertension, but states that intermittently since September 7, he has had episodes of low blood pressure.  He " followed up with urology and has a cystoscopy scheduled for 10/16/2023.  He lives in [facility].  He also has a headache which is not described as severe at this time.    Review of Systems   Constitutional:  Negative for diaphoresis and fever.   HENT:  Negative for facial swelling.    Eyes:  Negative for photophobia and discharge.   Respiratory:  Negative for stridor.    Cardiovascular:  Positive for chest pain.   Gastrointestinal:  Positive for nausea.   Musculoskeletal:  Positive for myalgias.   Neurological:  Positive for speech difficulty, numbness and headaches.   Psychiatric/Behavioral:          He denies suicidal ideation to me, he states that he has to live for his 5 children and his significant other.  He is just frustrated with his symptoms over the past month.     Past Medical History:   Diagnosis Date    Anxiety     Arthritis     spine    Depression     Hypertension      no medicine    Kidney stone    Neuropathy on gabapentin 800 mg 3 times daily  Restless leg syndrome  Osteoarthritis per patient  Cervical disc disease    No Known Allergies    Past Surgical History:   Procedure Laterality Date    CYSTOSCOPY URETEROSCOPY LASER LITHOTRIPSY Left 3/6/2017    Procedure: CYSTOSCOPY LEFT URETEROSCOPY HOLMIUM LASER LITHOTRIPSY POSSIBLE STENT PLACEMENT;  Surgeon: Arnulfo Serrano MD;  Location: [facility];  Service:     CYSTOSCOPY W/ URETERAL STENT PLACEMENT Left 3/9/2017    Procedure: CYSTOSCOPY URETERAL CATHETER/STENT INSERTION;  Surgeon: Arnulfo Serrano MD;  Location: [facility];  Service:     TONSILLECTOMY      TONSILLECTOMY     Patient reports history of cervical spine surgery for disc disease.    Family History   Problem Relation Age of Onset    Hypertension Father        Social History     Socioeconomic History    Marital status: Single   Tobacco Use    Smoking status: Every Day     Types: Electronic Cigarette     Last attempt to quit: 2016     Years since quittin.5    Smokeless  tobacco: Former    Tobacco comments:     quit  04/2016   Substance and Sexual Activity    Alcohol use: No    Drug use: No    Sexual activity: Defer           Objective   Physical Exam  Vitals and nursing note reviewed.   Constitutional:       Appearance: He is not diaphoretic.      Comments: BMI 27   HENT:      Mouth/Throat:      Mouth: Mucous membranes are moist.      Comments: No tongue laceration or contusion.  Eyes:      General: No scleral icterus.     Extraocular Movements: Extraocular movements intact.      Pupils: Pupils are equal, round, and reactive to light.      Comments: No photophobia or nystagmus.   Neck:      Comments: No meningismus.  Cardiovascular:      Rate and Rhythm: Normal rate and regular rhythm.      Heart sounds: No murmur heard.    No friction rub. No gallop.   Pulmonary:      Effort: Pulmonary effort is normal. No respiratory distress.      Breath sounds: Normal breath sounds. No stridor. No wheezing, rhonchi or rales.   Abdominal:      General: There is no distension.      Palpations: Abdomen is soft.      Tenderness: There is no abdominal tenderness. There is no guarding or rebound.   Musculoskeletal:      Cervical back: Neck supple.      Comments: No significant peripheral edema.  No calf tenderness bilaterally.   Skin:     General: Skin is warm and dry.      Coloration: Skin is not jaundiced.   Neurological:      Mental Status: He is alert.      Comments: Speech is slow, but no dysarthria. No facial droop appreciated. Motor 5/5 power x 4 extremities, symmetric. Sensation grossly decreased to light touch to left upper and lower extremities compared to right upper and lower extremities.  Sensation grossly decreased to light touch to right lower face compared to left lower face.  Rapid alternating movements slow for age. No dysmetria or past-pointing on finger-to-nose testing.  He is able to ambulate without assistance, however his gait is slow and appears antalgic.  No ataxia  appreciated.       Psychiatric:      Comments: Good eye contact for me, not suicidal.       Lumbar Puncture    Date/Time: 10/7/2023 12:45 AM    Performed by: Mitzi Kaufman MD  Authorized by: Mitzi Kaufman MD    Consent:     Consent obtained:  Verbal and written    Consent given by:  Patient    Risks, benefits, and alternatives were discussed: yes      Risks discussed:  Bleeding, infection, headache and pain    Alternatives discussed:  Observation  Universal protocol:     Procedure explained and questions answered to patient or proxy's satisfaction: yes      Test results available: yes      Imaging studies available: yes      Immediately prior to procedure a time out was called: yes      Site/side marked: yes      Patient identity confirmed:  Verbally with patient  Pre-procedure details:     Procedure purpose:  Diagnostic    Preparation: Patient was prepped and draped in usual sterile fashion    Anesthesia:     Anesthesia method:  Local infiltration    Local anesthetic:  Lidocaine 1% w/o epi  Procedure details:     Lumbar space:  L4-L5 interspace    Patient position:  R lateral decubitus    Needle gauge:  20    Number of attempts:  1    Opening pressure (cm H2O):  31    Fluid appearance:  Clear    Tubes of fluid:  4  Post-procedure details:     Puncture site:  Adhesive bandage applied and direct pressure applied    Procedure completion:  Tolerated well, no immediate complications             ED Course             Results and plan discussed with the patient and his significant other at bedside.           HEART Score: 2                      Medical Decision Making  Differential diagnosis includes multiple sclerosis, less likely ischemic stroke, not a candidate for TNK due to onset time of 6 AM and last known well time of sometime yesterday.  Low clinical suspicion for large vessel occlusion.  Less likely epilepsy, Guillain-Barré syndrome, viral illness, electrolyte abnormality, acute coronary syndrome,  medication side effect, and others.    Problems Addressed:  Atypical chest pain: complicated acute illness or injury  Left upper extremity numbness: complicated acute illness or injury  Myalgia: complicated acute illness or injury  Nonintractable headache, unspecified chronicity pattern, unspecified headache type: complicated acute illness or injury  Numbness of left lower extremity: complicated acute illness or injury  Rhinovirus infection: complicated acute illness or injury  Right facial numbness: complicated acute illness or injury    Amount and/or Complexity of Data Reviewed  Independent Historian:      Details: Significant other at bedside  External Data Reviewed: notes.     Details: Prior records from [facility] reviewed.  Labs: ordered. Decision-making details documented in ED Course.  Radiology: ordered. Decision-making details documented in ED Course.  ECG/medicine tests: ordered and independent interpretation performed. Decision-making details documented in ED Course.     Details: EKG at 2213 shows normal sinus rhythm at a rate of 83 bpm, normal intervals, nonspecific ST-T wave changes.  Discussion of management or test interpretation with external provider(s): Discussed with stroke neurology service, DOLORES Lowe, by phone.    Risk  Prescription drug management.  Decision regarding hospitalization.      Recent Results (from the past 24 hour(s))   aPTT    Collection Time: 10/06/23  9:55 PM    Specimen: Blood   Result Value Ref Range    PTT 30.8 22.0 - 39.0 seconds   Single High Sensitivity Troponin T    Collection Time: 10/06/23  9:55 PM    Specimen: Blood   Result Value Ref Range    HS Troponin T 7 <15 ng/L   Lactic Acid, Plasma    Collection Time: 10/06/23  9:55 PM    Specimen: Blood   Result Value Ref Range    Lactate 1.5 0.5 - 2.0 mmol/L   D-dimer, Quantitative    Collection Time: 10/06/23  9:55 PM    Specimen: Blood   Result Value Ref Range    D-Dimer, Quantitative <0.27 0.00 - 0.50  MCGFEU/mL   BNP    Collection Time: 10/06/23  9:55 PM    Specimen: Blood   Result Value Ref Range    proBNP <36.0 0.0 - 450.0 pg/mL   CK    Collection Time: 10/06/23  9:55 PM    Specimen: Blood   Result Value Ref Range    Creatine Kinase 191 20 - 200 U/L   TSH    Collection Time: 10/06/23  9:55 PM    Specimen: Blood   Result Value Ref Range    TSH 2.310 0.270 - 4.200 uIU/mL   Magnesium    Collection Time: 10/06/23  9:55 PM    Specimen: Blood   Result Value Ref Range    Magnesium 2.3 1.6 - 2.6 mg/dL   Ethanol    Collection Time: 10/06/23  9:55 PM    Specimen: Blood   Result Value Ref Range    Ethanol <10 0 - 10 mg/dL   Green Top (Gel)    Collection Time: 10/06/23  9:55 PM   Result Value Ref Range    Extra Tube Hold for add-ons.    Lavender Top    Collection Time: 10/06/23  9:55 PM   Result Value Ref Range    Extra Tube hold for add-on    Gold Top - SST    Collection Time: 10/06/23  9:55 PM   Result Value Ref Range    Extra Tube Hold for add-ons.    Light Blue Top    Collection Time: 10/06/23  9:55 PM   Result Value Ref Range    Extra Tube Hold for add-ons.    CBC Auto Differential    Collection Time: 10/06/23  9:55 PM    Specimen: Blood   Result Value Ref Range    WBC 9.09 3.40 - 10.80 10*3/mm3    RBC 4.56 4.14 - 5.80 10*6/mm3    Hemoglobin 13.9 13.0 - 17.7 g/dL    Hematocrit 43.0 37.5 - 51.0 %    MCV 94.3 79.0 - 97.0 fL    MCH 30.5 26.6 - 33.0 pg    MCHC 32.3 31.5 - 35.7 g/dL    RDW 13.0 12.3 - 15.4 %    RDW-SD 44.7 37.0 - 54.0 fl    MPV 10.5 6.0 - 12.0 fL    Platelets 293 140 - 450 10*3/mm3    Neutrophil % 63.5 42.7 - 76.0 %    Lymphocyte % 22.8 19.6 - 45.3 %    Monocyte % 7.8 5.0 - 12.0 %    Eosinophil % 3.1 0.3 - 6.2 %    Basophil % 1.1 0.0 - 1.5 %    Immature Grans % 1.7 (H) 0.0 - 0.5 %    Neutrophils, Absolute 5.78 1.70 - 7.00 10*3/mm3    Lymphocytes, Absolute 2.07 0.70 - 3.10 10*3/mm3    Monocytes, Absolute 0.71 0.10 - 0.90 10*3/mm3    Eosinophils, Absolute 0.28 0.00 - 0.40 10*3/mm3    Basophils, Absolute 0.10  0.00 - 0.20 10*3/mm3    Immature Grans, Absolute 0.15 (H) 0.00 - 0.05 10*3/mm3    nRBC 0.0 0.0 - 0.2 /100 WBC   Comprehensive Metabolic Panel    Collection Time: 10/06/23  9:55 PM    Specimen: Blood   Result Value Ref Range    Glucose 100 (H) 65 - 99 mg/dL    BUN 15 6 - 20 mg/dL    Creatinine 0.92 0.76 - 1.27 mg/dL    Sodium 140 136 - 145 mmol/L    Potassium 4.3 3.5 - 5.2 mmol/L    Chloride 102 98 - 107 mmol/L    CO2 29.0 22.0 - 29.0 mmol/L    Calcium 9.4 8.6 - 10.5 mg/dL    Total Protein 7.3 6.0 - 8.5 g/dL    Albumin 4.7 3.5 - 5.2 g/dL    ALT (SGPT) 30 1 - 41 U/L    AST (SGOT) 17 1 - 40 U/L    Alkaline Phosphatase 89 39 - 117 U/L    Total Bilirubin 0.3 0.0 - 1.2 mg/dL    Globulin 2.6 gm/dL    A/G Ratio 1.8 g/dL    BUN/Creatinine Ratio 16.3 7.0 - 25.0    Anion Gap 9.0 5.0 - 15.0 mmol/L    eGFR 109.2 >60.0 mL/min/1.73   Urinalysis With Microscopic If Indicated (No Culture) - Urine, Clean Catch    Collection Time: 10/06/23 10:03 PM    Specimen: Urine, Clean Catch   Result Value Ref Range    Color, UA Yellow Yellow, Straw    Appearance, UA Cloudy (A) Clear    pH, UA 5.5 5.0 - 8.0    Specific Gravity, UA 1.029 1.001 - 1.030    Glucose, UA Negative Negative    Ketones, UA Negative Negative    Bilirubin, UA Negative Negative    Blood, UA Negative Negative    Protein, UA Negative Negative    Leuk Esterase, UA Negative Negative    Nitrite, UA Negative Negative    Urobilinogen, UA 0.2 E.U./dL 0.2 - 1.0 E.U./dL   Urine Drug Screen - Urine, Clean Catch    Collection Time: 10/06/23 10:03 PM    Specimen: Urine, Clean Catch   Result Value Ref Range    THC, Screen, Urine Negative Negative    Phencyclidine (PCP), Urine Negative Negative    Cocaine Screen, Urine Negative Negative    Methamphetamine, Ur Negative Negative    Opiate Screen Negative Negative    Amphetamine Screen, Urine Negative Negative    Benzodiazepine Screen, Urine Negative Negative    Tricyclic Antidepressants Screen Positive (A) Negative    Methadone Screen, Urine  Negative Negative    Barbiturates Screen, Urine Negative Negative    Oxycodone Screen, Urine Positive (A) Negative    Propoxyphene Screen Negative Negative    Buprenorphine, Screen, Urine Negative Negative   Fentanyl, Urine - Urine, Clean Catch    Collection Time: 10/06/23 10:03 PM    Specimen: Urine, Clean Catch   Result Value Ref Range    Fentanyl, Urine Negative Negative   Urinalysis, Microscopic Only - Urine, Clean Catch    Collection Time: 10/06/23 10:03 PM    Specimen: Urine, Clean Catch   Result Value Ref Range    RBC, UA 0-2 None Seen, 0-2 /HPF    WBC, UA 3-5 (A) None Seen, 0-2 /HPF    Bacteria, UA None Seen None Seen, Trace /HPF    Squamous Epithelial Cells, UA 0-2 None Seen, 0-2 /HPF    Hyaline Casts, UA 0-6 0 - 6 /LPF    Methodology Automated Microscopy    Respiratory Panel PCR w/COVID-19(SARS-CoV-2) ELBA/KRISTIN/MICHELLE/[facility] In-House, NP Swab in UTM/VTM, 3-4 HR TAT - Swab, Nasopharynx    Collection Time: 10/06/23 10:06 PM    Specimen: Nasopharynx; Swab   Result Value Ref Range    ADENOVIRUS, PCR Not Detected Not Detected    Coronavirus 229E Not Detected Not Detected    Coronavirus HKU1 Not Detected Not Detected    Coronavirus NL63 Not Detected Not Detected    Coronavirus OC43 Not Detected Not Detected    COVID19 Not Detected Not Detected - Ref. Range    Human Metapneumovirus Not Detected Not Detected    Human Rhinovirus/Enterovirus Detected (A) Not Detected    Influenza A PCR Not Detected Not Detected    Influenza B PCR Not Detected Not Detected    Parainfluenza Virus 1 Not Detected Not Detected    Parainfluenza Virus 2 Not Detected Not Detected    Parainfluenza Virus 3 Not Detected Not Detected    Parainfluenza Virus 4 Not Detected Not Detected    RSV, PCR Not Detected Not Detected    Bordetella pertussis pcr Not Detected Not Detected    Bordetella parapertussis PCR Not Detected Not Detected    Chlamydophila pneumoniae PCR Not Detected Not Detected    Mycoplasma pneumo by PCR Not Detected Not Detected    ECG 12 Lead Stroke Evaluation    Collection Time: 10/06/23 10:13 PM   Result Value Ref Range    QT Interval 384 ms    QTC Interval 451 ms   POC Protime / INR    Collection Time: 10/06/23 10:51 PM    Specimen: Blood   Result Value Ref Range    Protime 14.5 12.8 - 15.2 seconds    INR 1.2 0.8 - 1.2   POC CHEM 8    Collection Time: 10/06/23 10:57 PM    Specimen: Blood   Result Value Ref Range    Sodium 139 138 - 146 mmol/L    POC Potassium 4.0 3.5 - 4.9 mmol/L    Chloride 99 98 - 109 mmol/L    Calcium, Arterial 1.21 mg/dL    BUN 16 mg/dL    Creatinine 1.00 mg/dL    Hemoglobin 12.9 12.0 - 17.0 g/dL   POCT Protime/INR    Collection Time: 10/06/23 10:58 PM    Specimen: Blood   Result Value Ref Range    Protime 14.5 seconds     Note: In addition to lab results from this visit, the labs listed above may include labs taken at another facility or during a different encounter within the last 24 hours. Please correlate lab times with ED admission and discharge times for further clarification of the services performed during this visit.    CT Head Without Contrast Stroke Protocol   Final Result   Impression:   1.No acute intracranial abnormality.   2.Left-sided orbital exotropia.            Electronically Signed: Crow Navas MD     10/7/2023 12:04 AM EDT     Workstation ID: [id number]      CT CEREBRAL PERFUSION WITH & WITHOUT CONTRAST   Final Result   Impression:   No CT perfusion evidence of infarct or ischemia.            Electronically Signed: Crow Navas MD     10/7/2023 12:08 AM EDT     Workstation ID: [id number]      XR Chest 1 View   Final Result   1.No evidence for acute cardiopulmonary process.      Electronically Signed: Bobby Chacon MD     10/6/2023 10:35 PM EDT     Workstation ID: [id number]      CT Angiogram Head w AI Analysis of LVO    (Results Pending)   CT Angiogram Neck    (Results Pending)   MRI Brain Without Contrast    (Results Pending)     Vitals:    10/06/23 2051   BP: 136/98   Pulse: 85   Resp: 16  "  Temp: 97.7 °F (36.5 °C)   SpO2: 99%   Weight: 92.5 kg (203 lb 14.8 oz)   Height: 182.9 cm (72\")     Medications   sodium chloride 0.9 % flush 10 mL (has no administration in time range)   sodium chloride 0.9 % infusion 1,000 mL (has no administration in time range)   sodium chloride 0.9 % flush 10 mL (has no administration in time range)   sodium chloride 0.9 % flush 10 mL (has no administration in time range)   sodium chloride 0.9 % infusion 40 mL (has no administration in time range)   atorvastatin (LIPITOR) tablet 80 mg (has no administration in time range)   clopidogrel (PLAVIX) tablet 300 mg (has no administration in time range)     And   clopidogrel (PLAVIX) tablet 75 mg (has no administration in time range)   magnesium sulfate in D5W 1g/100mL (PREMIX) (has no administration in time range)   iopamidol (ISOVUE-370) 76 % injection 150 mL (115 mL Intravenous Given 10/6/23 8692)     ECG/EMG Results (last 24 hours)       Procedure Component Value Units Date/Time    ECG 12 Lead Stroke Evaluation [[id number]] Collected: 10/06/23 2213     Updated: 10/06/23 2216     QT Interval 384 ms      QTC Interval 451 ms     Narrative:      Test Reason : Stroke Evaluation  Blood Pressure :   */*   mmHG  Vent. Rate :  83 BPM     Atrial Rate :  83 BPM     P-R Int : 170 ms          QRS Dur : 106 ms      QT Int : 384 ms       P-R-T Axes :  34 -11  71 degrees     QTc Int : 451 ms    Normal sinus rhythm  Nonspecific T wave abnormality  Abnormal ECG  No previous ECGs available    Referred By: RIKKI           Confirmed By:           ECG 12 Lead Stroke Evaluation   Preliminary Result   Test Reason : Stroke Evaluation   Blood Pressure :   */*   mmHG   Vent. Rate :  83 BPM     Atrial Rate :  83 BPM      P-R Int : 170 ms          QRS Dur : 106 ms       QT Int : 384 ms       P-R-T Axes :  34 -11  71 degrees      QTc Int : 451 ms      Normal sinus rhythm   Nonspecific T wave abnormality   Abnormal ECG   No previous ECGs available      Referred " By: RIKKI           Confirmed By:               Final diagnoses:   Right facial numbness   Left upper extremity numbness   Numbness of left lower extremity   Atypical chest pain   Rhinovirus infection   Myalgia   Nonintractable headache, unspecified chronicity pattern, unspecified headache type       ED Disposition  ED Disposition       ED Disposition   Decision to Admit    Condition   --    Comment   --               No follow-up provider specified.       Medication List      No changes were made to your prescriptions during this visit.            Mitzi Kaufman MD  10/07/23 0013       Mitzi Kaufman MD  10/07/23 0045       Mitzi Kaufman MD  10/07/23 0232

## 2023-10-07 NOTE — DISCHARGE SUMMARY
[facility] Medicine Services  DISCHARGE SUMMARY    Patient Name: Ritesh Azevedo  : 1985  MRN: [id number]    Date of Admission: 10/6/2023  9:54 PM  Date of Discharge:  10/7/2023  Primary Care Physician: Kenji Silva MD    Consults       Date and Time Order Name Status Description    10/6/2023  9:25 PM Inpatient Neurology Consult Stroke Completed             Hospital Course     Presenting Problem: weakness    Active Hospital Problems    Diagnosis  POA    **Right facial numbness [R20.0]  Yes      Resolved Hospital Problems   No resolved problems to display.          Hospital Course:  Ritesh Azevedo is a 38 y.o. male admitted for left-sided weakness and right-sided facial numbness that has been ongoing for the last several weeks.  Underwent LP in ER.  Noted to have rhinovirus on viral PCR.  LP with mildly elevated protein and elevated glucose.    Patient was noted to be vaping in his hospital room despite request that this is not hospital policy.  He was also taking his own narcotics despite request by nurse to keep his meds in schedule pharmacy.      Discharge Follow Up Recommendations for outpatient labs/diagnostics:  PCP in 1 week    Day of Discharge     HPI:   Sedated due to his narcotic medications, friend at bedside who is agreeable to drive him home as he was unwilling to handover prescription pill bottles    Review of Systems  Gen- No fevers, chills  CV- No chest pain, palpitations  Resp- No cough, dyspnea  GI- No N/V/D, abd pain      Vital Signs:   Temp:  [97.7 °F (36.5 °C)-98.4 °F (36.9 °C)] 98.4 °F (36.9 °C)  Heart Rate:  [73-85] 78  Resp:  [16-19] 18  BP: (113-137)/(72-98) 117/72      Physical Exam:  Constitutional: lethargic  HENT: NCAT, mucous membranes moist  Respiratory: Clear to auscultation bilaterally, respiratory effort normal   Cardiovascular: RRR, no murmurs, rubs, or gallops  Gastrointestinal: Soft, nontender, nondistended  Musculoskeletal: No  bilateral ankle edema  Psychiatric: Appropriate affect, cooperative  Neurologic: Oriented x 3, speech slow  Skin: No rashes      Pertinent  and/or Most Recent Results     LAB RESULTS:      Lab 10/07/23  0534 10/06/23  2258 10/06/23  2257 10/06/23  2251 10/06/23  2155   WBC 8.75  --   --   --  9.09   HEMOGLOBIN 13.7  --   --   --  13.9   HEMOGLOBIN, POC  --   --  12.9  --   --    HEMATOCRIT 41.6  --   --   --  43.0   PLATELETS 254  --   --   --  293   NEUTROS ABS 5.79  --   --   --  5.78   IMMATURE GRANS (ABS) 0.04  --   --   --  0.15*   LYMPHS ABS 1.85  --   --   --  2.07   MONOS ABS 0.68  --   --   --  0.71   EOS ABS 0.31  --   --   --  0.28   MCV 92.7  --   --   --  94.3   LACTATE  --   --   --   --  1.5   PROTIME  --  14.5  --  14.5  --    APTT  --   --   --   --  30.8   D DIMER QUANT  --   --   --   --  <0.27         Lab 10/07/23  0534 10/06/23  2257 10/06/23  2155   SODIUM 137  --  140   POTASSIUM 4.3  --  4.3   CHLORIDE 100  --  102   CO2 29.0  --  29.0   ANION GAP 8.0  --  9.0   BUN 12  --  15   CREATININE 0.84 1.00 0.92   EGFR 114.5  --  109.2   GLUCOSE 114*  --  100*   CALCIUM 9.1  --  9.4   MAGNESIUM 2.5  --  2.3   HEMOGLOBIN A1C 5.60  --   --    TSH  --   --  2.310  2.310         Lab 10/07/23  0534 10/06/23  2155   TOTAL PROTEIN 6.4 7.3   ALBUMIN 4.1 4.7   GLOBULIN 2.3 2.6   ALT (SGPT) 26 30   AST (SGOT) 15 17   BILIRUBIN 0.3 0.3   ALK PHOS 79 89         Lab 10/07/23  0534 10/06/23  2258 10/06/23  2251 10/06/23  2155   PROBNP  --   --   --  <36.0   HSTROP T <6  --   --  7   PROTIME  --  14.5 14.5  --    INR  --   --  1.2  --          Lab 10/07/23  0534   CHOLESTEROL 188   LDL CHOL 112*   HDL CHOL 32*   TRIGLYCERIDES 250*             Brief Urine Lab Results  (Last result in the past 365 days)        Color   Clarity   Blood   Leuk Est   Nitrite   Protein   CREAT   Urine HCG        10/06/23 2203 Yellow   Cloudy   Negative   Negative   Negative   Negative                 Microbiology Results (last 10 days)        Procedure Component Value - Date/Time    COVID PRE-OP / PRE-PROCEDURE SCREENING ORDER (NO ISOLATION) - Swab, Nasopharynx [[id number]]  (Abnormal) Collected: 10/06/23 2206    Lab Status: Final result Specimen: Swab from Nasopharynx Updated: 10/06/23 2329    Narrative:      The following orders were created for panel order COVID PRE-OP / PRE-PROCEDURE SCREENING ORDER (NO ISOLATION) - Swab, Nasopharynx.  Procedure                               Abnormality         Status                     ---------                               -----------         ------                     Respiratory Panel PCR w/...[[id number]]  Abnormal            Final result                 Please view results for these tests on the individual orders.    Respiratory Panel PCR w/COVID-19(SARS-CoV-2) ELBA/KRISTIN/MICHELLE/[facility] In-House, NP Swab in UTM/VTM, 3-4 HR TAT - Swab, Nasopharynx [[id number]]  (Abnormal) Collected: 10/06/23 2206    Lab Status: Final result Specimen: Swab from Nasopharynx Updated: 10/06/23 2329     ADENOVIRUS, PCR Not Detected     Coronavirus 229E Not Detected     Coronavirus HKU1 Not Detected     Coronavirus NL63 Not Detected     Coronavirus OC43 Not Detected     COVID19 Not Detected     Human Metapneumovirus Not Detected     Human Rhinovirus/Enterovirus Detected     Influenza A PCR Not Detected     Influenza B PCR Not Detected     Parainfluenza Virus 1 Not Detected     Parainfluenza Virus 2 Not Detected     Parainfluenza Virus 3 Not Detected     Parainfluenza Virus 4 Not Detected     RSV, PCR Not Detected     Bordetella pertussis pcr Not Detected     Bordetella parapertussis PCR Not Detected     Chlamydophila pneumoniae PCR Not Detected     Mycoplasma pneumo by PCR Not Detected    Narrative:      In the setting of a positive respiratory panel with a viral infection PLUS a negative procalcitonin without other underlying concern for bacterial infection, consider observing off antibiotics or discontinuation of antibiotics  and continue supportive care. If the respiratory panel is positive for atypical bacterial infection (Bordetella pertussis, Chlamydophila pneumoniae, or Mycoplasma pneumoniae), consider antibiotic de-escalation to target atypical bacterial infection.            MRI Brain Without Contrast    Result Date: 10/7/2023  MRI BRAIN WO CONTRAST Date of Exam: 10/7/2023 12:03 AM EDT Indication: Stroke, follow up.  Comparison: CT head, perfusion and angiography 10/6/2023. Technique:  Routine multiplanar/multisequence sequence images of the brain were obtained without contrast administration. Findings: There is no diffusion restriction to suggest acute infarct. There is no evidence of acute or chronic intracranial hemorrhage.  There are no significant signal abnormalities within the brain parenchyma. No mass effect or midline shift. No abnormal extra-axial collections.  The major vascular flow voids appear intact. The basal ganglia, brainstem and cerebellum appear within normal limits.  Calvarial and superficial soft tissue signal is within normal limits.  Orbits appear unremarkable. There is a  left maxillary sinus mucus retention cyst with mild adjacent mucosal thickening. There is right middle fer bullosa. Midline structures are intact.     Impression: 1.No acute intracranial abnormality. 2.Mild paranasal sinus mucosal disease. Electronically Signed: Crow Navas MD  10/7/2023 12:43 AM EDT  Workstation ID: [id number]    CT Angiogram Head w AI Analysis of LVO    Result Date: 10/7/2023  CT ANGIOGRAM HEAD W AI ANALYSIS OF LVO CT ANGIOGRAM NECK Date of Exam: 10/6/2023 11:36 PM EDT Indication: Neuro deficit, acute stroke suspected Neuro deficit, acute stroke suspected. Comparison: None available. Technique: CTA of the head and neck was performed after the uneventful intravenous administration of 150 mL of Isovue-370. Reconstructed coronal and sagittal images were also obtained. In addition, a 3-D volume rendered image was  created for interpretation. Automated exposure control and iterative reconstruction methods were used. Findings: Aortic arch: The aortic arch is unremarkable. There is 4 vessel arch anatomy with the left vertebral artery arising directly from the aortic arch. The right brachiocephalic and visualized bilateral subclavian arteries are within normal limits. Right carotid: The right CCA arises as expected from the brachiocephalic trunk.  The CCA follows a normal course and appears normal caliber.  The carotid bifurcation is unremarkable.  The external carotid artery and distal branches appear within normal limits.  The cervical internal carotid artery follows a normal course and appears normal caliber throughout the neck and into the head.  The intracranial ICA segments appear within normal limits.  The ophthalmic artery origin is normal.  The A1 and M1 segments appear within normal limits.  The visualized distal SHANNAN and MCA branches appear patent.  There is  a patent  anterior communicating artery. There is a patent  posterior communicating artery. Left carotid: The left CCA arises as expected from the aortic arch.   The CCA follows a normal course and appears normal caliber.  The carotid bifurcation is unremarkable.  The external carotid artery and distal branches appear within normal limits.  The  cervical internal carotid artery follows a normal course and appears normal caliber throughout the neck and into the head.  The intracranial ICA segments appear within normal limits.  The ophthalmic artery origin is normal.  The A1 and M1 segments appear within normal limits.  The visualized distal SHANNAN and MCA branches appear patent.  There is a patent  posterior communicating artery. Posterior circulation: Vertebral arteries arise as expected from ipsilateral subclavian arteries. There is left vertebral artery dominance. The vertebral arteries follow a normal course and appear normal caliber throughout the neck and into  the head.  The V4 segments are patent.  Visualized posterior inferior cerebellar arteries are within normal limits.  The basilar artery is normal caliber.  Superior cerebellar arteries are patent.  Bilateral P1 segments and posterior cerebral arteries appear within  normal limits. Nonvascular findings: Intracranial structures appear unremarkable.  Orbits are within normal limits. There are left maxillary sinus mucus retention cysts. Superficial soft tissues and underlying musculature appear within normal limits.  The lung apices are clear.  The thyroid and salivary glands appear unremarkable.  The suprahyoid and infrahyoid spaces of the neck appear unremarkable.  There is no evidence of cervical lymphadenopathy or a neck mass.  There are no acute osseous abnormalities or destructive bone lesions. There is a prosthetic disc at the C5-C6 level.     Impression: 1.No significant vascular abnormality in the head or the neck. No significant stenosis, aneurysm or dissection. 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 Electronically Signed: Crow Navas MD  10/7/2023 12:11 AM EDT  Workstation ID: [id number]    CT Angiogram Neck    Result Date: 10/7/2023  CT ANGIOGRAM HEAD W AI ANALYSIS OF LVO CT ANGIOGRAM NECK Date of Exam: 10/6/2023 11:36 PM EDT Indication: Neuro deficit, acute stroke suspected Neuro deficit, acute stroke suspected. Comparison: None available. Technique: CTA of the head and neck was performed after the uneventful intravenous administration of 150 mL of Isovue-370. Reconstructed coronal and sagittal images were also obtained. In addition, a 3-D volume rendered image was created for interpretation. Automated exposure control and iterative reconstruction methods were used. Findings: Aortic arch: The aortic arch is unremarkable. There is 4 vessel arch anatomy with the left vertebral artery arising directly from the aortic arch. The right brachiocephalic and visualized bilateral subclavian arteries are within normal  limits. Right carotid: The right CCA arises as expected from the brachiocephalic trunk.  The CCA follows a normal course and appears normal caliber.  The carotid bifurcation is unremarkable.  The external carotid artery and distal branches appear within normal limits.  The cervical internal carotid artery follows a normal course and appears normal caliber throughout the neck and into the head.  The intracranial ICA segments appear within normal limits.  The ophthalmic artery origin is normal.  The A1 and M1 segments appear within normal limits.  The visualized distal SHANNAN and MCA branches appear patent.  There is  a patent  anterior communicating artery. There is a patent  posterior communicating artery. Left carotid: The left CCA arises as expected from the aortic arch.   The CCA follows a normal course and appears normal caliber.  The carotid bifurcation is unremarkable.  The external carotid artery and distal branches appear within normal limits.  The  cervical internal carotid artery follows a normal course and appears normal caliber throughout the neck and into the head.  The intracranial ICA segments appear within normal limits.  The ophthalmic artery origin is normal.  The A1 and M1 segments appear within normal limits.  The visualized distal SHANNAN and MCA branches appear patent.  There is a patent  posterior communicating artery. Posterior circulation: Vertebral arteries arise as expected from ipsilateral subclavian arteries. There is left vertebral artery dominance. The vertebral arteries follow a normal course and appear normal caliber throughout the neck and into the head.  The V4 segments are patent.  Visualized posterior inferior cerebellar arteries are within normal limits.  The basilar artery is normal caliber.  Superior cerebellar arteries are patent.  Bilateral P1 segments and posterior cerebral arteries appear within  normal limits. Nonvascular findings: Intracranial structures appear unremarkable.   Orbits are within normal limits. There are left maxillary sinus mucus retention cysts. Superficial soft tissues and underlying musculature appear within normal limits.  The lung apices are clear.  The thyroid and salivary glands appear unremarkable.  The suprahyoid and infrahyoid spaces of the neck appear unremarkable.  There is no evidence of cervical lymphadenopathy or a neck mass.  There are no acute osseous abnormalities or destructive bone lesions. There is a prosthetic disc at the C5-C6 level.     Impression: 1.No significant vascular abnormality in the head or the neck. No significant stenosis, aneurysm or dissection. 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 Electronically Signed: Crow Navas MD  10/7/2023 12:11 AM EDT  Workstation ID: [id number]    CT CEREBRAL PERFUSION WITH & WITHOUT CONTRAST    Result Date: 10/7/2023  CT CEREBRAL PERFUSION W WO CONTRAST Date of Exam: 10/6/2023 11:36 PM EDT Indication: Neuro deficit, acute stroke suspected.  Comparison: None available. Technique: Axial CT images of the brain were obtained prior to and after the administration of 150 mL Isovue-370. Core blood volume, core blood flow, mean transit time, and Tmax images were obtained utilizing the Rapid software protocol. A limited CT angiogram of the head was also performed to measure the blood vessel density. The radiation dose reduction device was turned on for each scan per the ALARA (As Low as Reasonably Achievable) protocol. Findings: Cerebral blood flow, blood volume and mean transit time maps are symmetric without large perfusion defect. There are minor Tmax abnormalities, which appears symmetric and confined to the white matter, likely artifact. CBF<30% volume: 0 mL Tmax>6sec volume: 0 mL Mismatch volume: 0 mL Mismatch ratio: None.     Impression: No CT perfusion evidence of infarct or ischemia. Electronically Signed: Crow Navas MD  10/7/2023 12:08 AM EDT  Workstation ID: [id number]    CT Head Without Contrast Stroke  Protocol    Result Date: 10/7/2023  CT HEAD WO CONTRAST Date of Exam: 10/6/2023 11:36 PM EDT Indication: Neuro deficit, acute, stroke suspected Neuro deficit, acute stroke suspected. Comparison: None available. Technique: Axial CT images were obtained of the head without contrast administration.  Reconstructed coronal images were also obtained. Automated exposure control and iterative construction methods were used. Findings: Superficial soft tissues appear within normal limits. The calvarium is intact. There are left maxillary sinus mucus retention cysts. Mastoid air cells appear well aerated. There is left-sided orbital exotropia. There is no acute intracranial hemorrhage.  No mass effect or midline shift.  No abnormal extra-axial collections.  Gray-white differentiation is within normal limits.  There are no focal hypoattenuating lesions.  Ventricular size and configuration is normal for age.     Impression: 1.No acute intracranial abnormality. 2.Left-sided orbital exotropia. Electronically Signed: Crow Navas MD  10/7/2023 12:04 AM EDT  Workstation ID: [id number]    XR Chest 1 View    Result Date: 10/6/2023  XR CHEST 1 VW Date of Exam: 10/6/2023 10:20 PM EDT Indication: Acute Stroke Protocol (onset < 12 hrs) Comparison: None available. FINDINGS: No consolidations or pleural effusions are observed. The cardiac silhouette is within normal limits for size. The mediastinum is unremarkable. No acute osseous abnormalities are identified on this single view.     1.No evidence for acute cardiopulmonary process. Electronically Signed: Bobby Chacon MD  10/6/2023 10:35 PM EDT  Workstation ID: [id number]                 Plan for Follow-up of Pending Labs/Results:   Pending Labs       Order Current Status    Culture, CSF - Cerebrospinal Fluid, Lumbar Puncture In process          Discharge Details        Discharge Medications        ASK your doctor about these medications        Instructions Start Date   amitriptyline 25 MG  tablet  Commonly known as: ELAVIL   Nightly      doxepin 50 MG capsule  Commonly known as: SINEquan   Take 1-2 capsules by mouth every night at bedtime      lisinopril 20 MG tablet  Commonly known as: PRINIVIL,ZESTRIL   No dose, route, or frequency recorded.      omeprazole 40 MG capsule  Commonly known as: priLOSEC   40 mg, Oral, Daily      oxybutynin XL 10 MG 24 hr tablet  Commonly known as: DITROPAN-XL   No dose, route, or frequency recorded.      Testosterone Cypionate 200 MG/ML injection  Commonly known as: DEPOTESTOTERONE CYPIONATE   Intramuscular, Every 14 Days               No Known Allergies      Discharge Disposition:      Diet:  Hospital:  Diet Order   Procedures    Diet: Cardiac Diets; Healthy Heart (2-3 Na+); Texture: Regular Texture (IDDSI 7); Fluid Consistency: Thin (IDDSI 0)            Activity:      Restrictions or Other Recommendations:         CODE STATUS:    Code Status and Medical Interventions:   Ordered at: 10/07/23 0045     Level Of Support Discussed With:    Patient     Code Status (Patient has no pulse and is not breathing):    CPR (Attempt to Resuscitate)     Medical Interventions (Patient has pulse or is breathing):    Full Support       No future appointments.              Roselia Chapman,   10/07/23      Time Spent on Discharge:  I spent  20  minutes on this discharge activity which included: face-to-face encounter with the patient, reviewing the data in the system, coordination of the care with the nursing staff as well as consultants, documentation, and entering orders.

## 2023-10-07 NOTE — CONSULTS
"Stroke Consult Note    Patient Name: Ritesh Azevedo   MRN: [id number]  Age: 38 y.o.  Sex: male  : 1985    Primary Care Physician: Kenji Silva MD  Referring Physician: Mandeep LEONE    TIME STROKE TEAM CALLED: 2133 EST     TIME PATIENT SEEN: 2133 EST    Handedness: Right  Race:     Chief Complaint/Reason for Consultation: Left side numbness and tingling decreased sensation    HPI:   This is Mr. Ritesh Azevedo is a 33-year-old white male, right-handed with significant health diagnosis for anxiety, osteoarthritis, degenerative disc disease, depression, hypertension not on any medication, kidney stones, back pain on chronic pain medication, neuropathy, who presents to [facility] ED accompanied by his significant other for evaluation of left-sided numbness and tingling that possibly could relate to a stroke.  Patient was evaluated earlier with [facility] for the same stroke work-up however he was told everything is negative and was discharged home on aspirin and Lipitor.  Patient does not take either due to his perception that \"both are not good for him\".  Stroke neurology was consulted at the ED for further evaluation of patient's symptoms.  Patient is poor historian, seem to be mild slurred speech at the time. Upon my exam at the ED patient is alert and oriented follow commands reports that his left side numbness has been worsening over the last 3-1/2 weeks.  However he always had numbness and tingling on all 4 extremities secondary to his back pain and DDD.  Patient reports that he woke up couple weeks ago with left ear numbness that progressed to his left facial numbness then worsening of his left side numbness with decreased sensation more dull compared to the right.  Also he reported that he woke up 1 day failed all his body was paralyzed was unable to move, episode lasted for few minutes then he was started to be able to move his upper extremity was marked lower " extremity that lasted for more few hours.  Patient at that time decided to go to [facility] to be evaluated for a stroke however he reports that they told him everything was negative and discharged him with aspirin and Lipitor.    Patient reports he feels that his left side more weaker than the right with persistent numbness, headache that is frontal not associated with vision changes, no photophobia or phonophobia reported, no tinnitus.  Headache is rated 7-8 out of 10 throbbing.    No aphasia, no confusion, no occultly swallowing, no other reported focal weakness or sensory loss.  Patient lives with his significant other, do not use any ambulatory devices.  Patient is independent with all ADL able to drive.  Patient reports vaping daily only nicotine products.  No reported illicit drug or marijuana or alcohol use.      Last Known Normal Date/Time: 3 weeks ago EST     Review of Systems   Constitutional:  Positive for activity change.   HENT:  Negative for hearing loss, sore throat, tinnitus and trouble swallowing.    Eyes:  Negative for photophobia and visual disturbance.   Respiratory:  Negative for shortness of breath.    Cardiovascular:  Positive for chest pain. Negative for palpitations.   Gastrointestinal:  Negative for blood in stool, nausea and vomiting.   Endocrine: Negative.    Genitourinary:  Negative for hematuria.   Musculoskeletal:  Positive for arthralgias, back pain, myalgias and neck pain.   Skin: Negative.    Neurological:  Positive for weakness, numbness and headaches.   Hematological: Negative.    Psychiatric/Behavioral: Negative.      Past Medical History:   Diagnosis Date    Anxiety     Arthritis     spine    Depression     Hypertension      no medicine    Kidney stone      Past Surgical History:   Procedure Laterality Date    CYSTOSCOPY URETEROSCOPY LASER LITHOTRIPSY Left 3/6/2017    Procedure: CYSTOSCOPY LEFT URETEROSCOPY HOLMIUM LASER LITHOTRIPSY POSSIBLE STENT PLACEMENT;  Surgeon:  Arnulfo Serrano MD;  Location: [facility] OR;  Service:     CYSTOSCOPY W/ URETERAL STENT PLACEMENT Left 3/9/2017    Procedure: CYSTOSCOPY URETERAL CATHETER/STENT INSERTION;  Surgeon: Arnulfo Serrano MD;  Location: [facility] OR;  Service:     TONSILLECTOMY      TONSILLECTOMY       Family History   Problem Relation Age of Onset    Hypertension Father      Social History     Socioeconomic History    Marital status: Single   Tobacco Use    Smoking status: Every Day     Types: Electronic Cigarette     Last attempt to quit: 2016     Years since quittin.5    Smokeless tobacco: Former    Tobacco comments:     quit  2016   Substance and Sexual Activity    Alcohol use: No    Drug use: No    Sexual activity: Defer     No Known Allergies  Prior to Admission medications    Medication Sig Start Date End Date Taking? Authorizing Provider   amitriptyline (ELAVIL) 25 MG tablet Every Night. 18   Nicole Cunningham MD   doxepin (SINEquan) 50 MG capsule Take 1-2 capsules by mouth every night at bedtime 5/15/18   Analilia Rosales APRN   lisinopril (PRINIVIL,ZESTRIL) 20 MG tablet  3/18/18   Nicole Cunningham MD   omeprazole (priLOSEC) 40 MG capsule Take 40 mg by mouth Daily.    Nicole Cunningham MD   oxybutynin XL (DITROPAN-XL) 10 MG 24 hr tablet  18   Nicole Cunningham MD   Testosterone Cypionate (DEPOTESTOTERONE CYPIONATE) 200 MG/ML injection Inject  into the shoulder, thigh, or buttocks Every 14 (Fourteen) Days.    Nicole Cunningham MD         Temp:  [97.7 °F (36.5 °C)] 97.7 °F (36.5 °C)  Heart Rate:  [85] 85  Resp:  [16] 16  BP: (136)/(98) 136/98  Neurological Exam  Mental Status  Alert. Oriented to person, place and time. Oriented to person, place, and time. Recent and remote memory are intact. Speech is normal. Language is fluent with no aphasia. Attention and concentration are normal.    Cranial Nerves  CN II: Right visual acuity: Normal. Left visual acuity: Normal. Right normal  visual field. Left normal visual field.  CN III, IV, VI: Extraocular movements intact bilaterally. Normal lids and orbits bilaterally. Pupils equal round and reactive to light bilaterally.  CN V: Facial sensation is normal.  CN VII: Full and symmetric facial movement.  CN VIII: Intact hearing bilateral.  CN IX, X: Palate elevates symmetrically  CN XI: Shoulder shrug strength is normal.  CN XII: Tongue midline without atrophy or fasciculations.    Motor  Normal muscle bulk throughout. No fasciculations present. Normal muscle tone. No abnormal involuntary movements. Strength is 5/5 throughout all four extremities.    Sensory  Decreased sensation to the left side (dull)compared to the right side  Numbness and tingling bilateral upper and lower extremity.  No right-sided hemispatial neglect. No left-sided hemispatial neglect. Right extinction absent: Left extinction absent:    Reflexes                                            Right                      Left  Plantar                           Downgoing                Downgoing    Right pathological reflexes: Judith's absent. Ankle clonus absent.  Left pathological reflexes: Judith's absent. Ankle clonus absent.    Coordination  Right: Finger-to-nose normal. Heel-to-shin normal.Left: Finger-to-nose normal. Heel-to-shin normal.    Gait  Casual gait is normal including stance, stride, and arm swing.    Physical Exam  HENT:      Head: Normocephalic and atraumatic.      Mouth/Throat:      Mouth: Mucous membranes are moist.   Eyes:      General: Lids are normal.      Extraocular Movements: Extraocular movements intact.      Pupils: Pupils are equal, round, and reactive to light.   Cardiovascular:      Rate and Rhythm: Normal rate and regular rhythm.      Pulses: Normal pulses.   Pulmonary:      Effort: Pulmonary effort is normal.      Comments: Breathing comfortable on room air  Abdominal:      Tenderness: There is no abdominal tenderness.   Musculoskeletal:          General: Normal range of motion.      Cervical back: Normal range of motion and neck supple.   Skin:     General: Skin is warm and dry.   Neurological:      General: No focal deficit present.      Mental Status: He is alert and oriented to person, place, and time. Mental status is at baseline.      Motor: Motor strength is normal.   Psychiatric:         Speech: Speech normal.      Comments: Apathy flat affect       Acute Stroke Data    Thrombolytic Inclusion / Exclusion Criteria    Time: 21:32 EDT  Person Administering Scale: DOLORES Araiza    Inclusion Criteria  [x]   18 years of age or greater   []   Onset of symptoms < 4.5 hours before beginning treatment (stroke onset = time patient was last seen well or without symptoms).   []   Diagnosis of acute ischemic stroke causing measurable disabling deficit (Complete Hemianopia, Any Aphasia, Visual or Sensory Extinction, Any weakness limiting sustained effort against gravity)   []   Any remaining deficit considered potentially disabling in view of patient and practitioner   Exclusion criteria (Do not proceed with Alteplase if any are checked under exclusion criteria)  [x]   Onset unknown or GREATER than 4.5 hours   []   ICH on CT/MRI   []   CT demonstrates hypodensity representing acute or subacute infarct   []   Significant head trauma or prior stroke in the previous 3 months   []   Symptoms suggestive of subarachnoid hemorrhage   []   History of un-ruptured intracranial aneurysm GREATER than 10 mm   []   Recent intracranial or intraspinal surgery within the last 3 months   []   Arterial puncture at a non-compressible site in the previous 7 days   []   Active internal bleeding   []   Acute bleeding tendency   []   Platelet count LESS than 100,000 for known hematological diseases such as leukemia, thrombocytopenia or chronic cirrhosis   []   Current use of anticoagulant with INR GREATER than 1.7 or PT GREATER than 15 seconds, aPTT GREATER than 40 seconds    []   Heparin received within 48 hours, resulting in abnormally elevated aPTT GREATER than upper limit of normal   []   Current use of direct thrombin inhibitors or direct factor Xa inhibitors in the past 48 hours   []   Elevated blood pressure refractory to treatment (systolic GREATER than 185 mm/Hg or diastolic  GREATER than 110 mm/Hg   []   Suspected infective endocarditis and aortic arch dissection   []   Current use of therapeutic treatment dose of low-molecular-weight heparin (LMWH) within the previous 24 hours   []   Structural GI malignancy or bleed   Relative exclusion for all patients  [x]   Only minor non-disabling symptoms   []   Pregnancy   []   Seizure at onset with postictal residual neurological impairments   []   Major surgery or previous trauma within past 14 days   []   History of previous spontaneous ICH, intracranial neoplasm, or AV malformation   []   Postpartum (within previous 14 days)   []   Recent GI or urinary tract hemorrhage (within previous 21 days)   []   Recent acute MI (within previous 3 months)   []   History of un-ruptured intracranial aneurysm LESS than 10 mm   []   History of ruptured intracranial aneurysm   []   Blood glucose LESS than 50 mg/dL (2.7 mmol/L)   []   Dural puncture within the last 7 days   []   Known GREATER than 10 cerebral microbleeds   Additional exclusions for patients with symptoms onset between 3 and 4.5 hours.  []   Age > 80.   []   On any anticoagulants regardless of INR  >>> Warfarin (Coumadin), Heparin, Enoxaparin (Lovenox), fondaparinux (Arixtra), bivalirudin (Angiomax), Argatroban, dabigatran (Pradaxa), rivaroxaban (Xarelto), or apixaban (Eliquis)   []   Severe stroke (NIHSS > 25).   []   History of BOTH diabetes and previous ischemic stroke.   []   The risks and benefits have been discussed with the patient or family related to the administration of IV thrombolytic therapy for stroke symptoms.   []   I have discussed and reviewed the patient's case  and imaging with the attending prior to IV thrombolytic therapy.   NA Time IV thrombolytic administered       Hospital Meds:  Scheduled- [START ON 10/7/2023] aspirin, 81 mg, Oral, Daily   Or  [START ON 10/7/2023] aspirin, 300 mg, Rectal, Daily  atorvastatin, 80 mg, Oral, Nightly  clopidogrel, 300 mg, Oral, Once   And  [START ON 10/7/2023] clopidogrel, 75 mg, Oral, Daily  sodium chloride, 10 mL, Intravenous, Q12H  sodium chloride, 1,000 mL, Intravenous, Once      Infusions-     PRNs-   sodium chloride    sodium chloride    sodium chloride    Functional Status Prior to Current Stroke/Jose J Score: 0    NIH Stroke Scale  Time: 21:32 EDT  Person Administering Scale: DOLORES Araiza    Interval: baseline  1a. Level of Consciousness: 0-->Alert, keenly responsive  1b. LOC Questions: 0-->Answers both questions correctly  1c. LOC Commands: 0-->Performs both tasks correctly  2. Best Gaze: 0-->Normal  3. Visual: 0-->No visual loss  4. Facial Palsy: 0-->Normal symmetrical movements  5a. Motor Arm, Left: 0-->No drift, limb holds 90 (or 45) degrees for full 10 secs  5b. Motor Arm, Right: 0-->No drift, limb holds 90 (or 45) degrees for full 10 secs  6a. Motor Leg, Left: 0-->No drift, leg holds 30 degree position for full 5 secs  6b. Motor Leg, Right: 0-->No drift, leg holds 30 degree position for full 5 secs  7. Limb Ataxia: 0-->Absent  8. Sensory: 1-->Mild-to-moderate sensory loss, patient feels pinprick is less sharp or is dull on the affected side, or there is a loss of superficial pain with pinprick, but patient is aware of being touched  9. Best Language: 0-->No aphasia, normal  10. Dysarthria: 0-->Normal  11. Extinction and Inattention (formerly Neglect): 0-->No abnormality    Total (NIH Stroke Scale): 1        Results Reviewed:  I have personally reviewed current lab, radiology, and data and agree with results.    Labs are reviewed unremarkable    XR Chest 1 View    Result Date: 10/6/2023  1.No evidence for acute  cardiopulmonary process. Electronically Signed: Bobby Chacon MD  10/6/2023 10:35 PM EDT  Workstation ID: [id number]     MRI Brain Without Contrast    Result Date: 10/7/2023  Impression: 1.No acute intracranial abnormality. 2.Mild paranasal sinus mucosal disease. Electronically Signed: Crow Navas MD  10/7/2023 12:43 AM EDT  Workstation ID: [id number]    CT Angiogram Head w AI Analysis of LVO    Result Date: 10/7/2023  Impression: 1.No significant vascular abnormality in the head or the neck. No significant stenosis, aneurysm or dissection. 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 Electronically Signed: Crow Navas MD  10/7/2023 12:11 AM EDT  Workstation ID: [id number]    CT Angiogram Neck    Result Date: 10/7/2023  Impression: 1.No significant vascular abnormality in the head or the neck. No significant stenosis, aneurysm or dissection. 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 Electronically Signed: Crow Navas MD  10/7/2023 12:11 AM EDT  Workstation ID: [id number]    CT CEREBRAL PERFUSION WITH & WITHOUT CONTRAST    Result Date: 10/7/2023  Impression: No CT perfusion evidence of infarct or ischemia. Electronically Signed: Crow Navas MD  10/7/2023 12:08 AM EDT  Workstation ID: [id number]    CT Head Without Contrast Stroke Protocol    Result Date: 10/7/2023  Impression: 1.No acute intracranial abnormality. 2.Left-sided orbital exotropia. Electronically Signed: Crow Navas MD  10/7/2023 12:04 AM EDT  Workstation ID: [id number]    XR Chest 1 View    Result Date: 10/6/2023  1.No evidence for acute cardiopulmonary process. Electronically Signed: Bobby Chacon MD  10/6/2023 10:35 PM EDT  Workstation ID: [id number]     Assessment/Plan:    This is a 38-year-old white male, right-handed with significant health diagnosis for hypertension, arthritis, chronic back pain on chronic pain medication who presents today to [facility] ED for evaluation of left-sided numbness wakes and wean symptoms of weakness to the left side, dull  sensation.  Patient is not a candidate for IV thrombolytic therapy secondary to last known well more than 4.5 hours.  Patient is not a candidate for thrombectomy secondary to NIH 1 for numbness nondisabling, symptoms been waxing and waning over weeks.    Antiplatelet PTA: None  Anticoagulant PTA: None        Left-sided paresthesias  DDx TIA\ischemic stroke, neoplasm, neuropathy, degenerative disorder, MS, migraine complex.  -TIA\ischemic stroke without IV thrombolytic order set in place  -CTH, CTA, CTP,negative ( no hemorrhage, no LVO, no perfusion deficit )  -MRI ordered and pending  -Patient reports unable to take aspirin for some sort of kidney issues we can load with Plavix  300 mg then followed by 75 mg.  -Allow gradual normalization of blood pressure  -Initiate Lipitor 80 mg at night and daily for secondary stroke prevention, discussed with patient education provided, patient verbalized understanding however there is noncompliance with medication per patient report.  -Echo ordered and pending  -Neuro check\NIH per protocol  -Lipid profile ordered and pending with LDL goal less than 70  -A1c ordered and pending with goal less than 7  -PT\OT\SLP evaluation  -Neurology stroke we will continue to follow-up    Essential hypertension  -Allow gradual normalization of blood pressure  -Management by medicine team  -If MRI is negative okay for blood pressure to be normotensive.    Headache possible migraine headache.  -Patient reports history of migraine headache not associated with nausea or vomiting, no photophobia or photosensitivity reported.  -We will give 1 g of mag once IV.  -If headache is not resolved okay for migraine cocktail to be ordered and given to the patient till resolution of symptoms.  -Patient will follow-up outpatient with general neurology    Nicotine use via vaping  -Counseling on cessation  -Nicotine replacement management by medicine team    Overweight  -BMI 27.66  -Complicates all aspects of  care  -Counseling on healthy diet, exercise      Depression  -Management by medicine team    I discussed plan of care with patient, ED physician.  Neurology stroke we will continue to follow-up.  Thank you for letting us participate in patient care.    DOLORES Araiza  October 6, 2023  21:32 EDT

## 2023-10-07 NOTE — H&P
"    [facility] Medicine Services  HISTORY AND PHYSICAL    Patient Name: Ritesh Azevedo  : 1985  MRN: [id number]  Primary Care Physician: Kenji Silva MD  Date of admission: 10/6/2023      Subjective   Subjective     Chief Complaint:  facial numbness    HPI:  Ritesh Azevedo is a 38 y.o. male who states that he has had some left-sided extremity numbness as well as right-sided facial numbness for the last 3-4 weeks.  He also adds that earlier today he was with his young son at the son's doctor's appointment where an ultrasound was being done, and the patient states he had sudden onset dizziness and lightheadedness, rather sudden weakness in the lower extremities, and had a near syncopal episode.  Other than some residual bilateral lower extremity weakness and right inferior lip numbness, he states his symptoms from earlier today have resolved.  He has been seen at other facilities in the last several weeks for various c/o facial numbness, facial and extremity numbness and tingling, and even paralysis including  and [facility].  He was worked up for stroke at [facility], but the work-up was negative and he was discharged with aspirin and statin; he states he was told that he needed a lumbar puncture, and notes that his PCP wanted him to have 1, but this was not done during his recent hospital visits, and he had requested that the ED do this while he was here (this was indeed performed by the ED physician).  He has not been taking the aspirin and statin, as he states that he feels atorvastatin \"has a bad reputation.\"  He notes that 4 weeks ago he woke up with \"an area of paralysis behind my left ear,\" and was completely paralyzed hours later, but 4 days later all movement had returned. Today (Friday), after the near syncopal event described above, he states he feels like his left upper and lower extremities are weaker than the right side, but he still has right " "facial numbness.  He also confirms myalgias but denies any fever/chills, nausea/emesis, abdominal pain, bowel habit change, chest pain, dyspnea, urinary symptoms, visual changes, headache, or syncope.  His medical history is significant for degenerative disc disease with chronic back pain and he notes that he has 2 prosthetic disks in his cervical spine, hypothyroidism, anxiety/depression, hypertension, and prior nephrolithiasis.  He states he has high rheumatoid factor and has been diagnosed with rheumatoid arthritis but has never taken any medication for this; he denies any joint pain during my visit but states he has had \"pain and burning in my bones\" for approximately 2 weeks, off and on.      Personal History     Past Medical History:   Diagnosis Date    Anxiety     Arthritis     spine    Depression     Hypertension      no medicine    Kidney stone              Past Surgical History:   Procedure Laterality Date    CYSTOSCOPY URETEROSCOPY LASER LITHOTRIPSY Left 3/6/2017    Procedure: CYSTOSCOPY LEFT URETEROSCOPY HOLMIUM LASER LITHOTRIPSY POSSIBLE STENT PLACEMENT;  Surgeon: Arnulfo Serrano MD;  Location: [facility];  Service:     CYSTOSCOPY W/ URETERAL STENT PLACEMENT Left 3/9/2017    Procedure: CYSTOSCOPY URETERAL CATHETER/STENT INSERTION;  Surgeon: Arnulfo Serrano MD;  Location: [facility];  Service:     TONSILLECTOMY      TONSILLECTOMY         Family History: family history includes Hypertension in his father.     Social History:  reports that he has been smoking electronic cigarette. He has quit using smokeless tobacco. He reports that he does not drink alcohol and does not use drugs.  Social History     Social History Narrative    Not on file       Medications:  Available home medication information reviewed.  (Not in a hospital admission)      No Known Allergies    Objective   Objective     Vital Signs:   Temp:  [97.7 °F (36.5 °C)] 97.7 °F (36.5 °C)  Heart Rate:  [85] 85  Resp:  [16] 16  BP: " (136)/(98) 136/98  Total (NIH Stroke Scale): 0    Physical Exam   Constitutional: Awake, alert, NAD.  Eyes: PERRLA, sclerae anicteric, no conjunctival injection  HENT: NCAT, mucous membranes moist  Neck: Supple, no thyromegaly, no lymphadenopathy, trachea midline  Respiratory: Clear to auscultation bilaterally, nonlabored respirations   Cardiovascular: RRR, no murmurs, rubs, or gallops, palpable pedal pulses bilaterally  Gastrointestinal: Positive bowel sounds, soft, nontender, nondistended  Musculoskeletal: No bilateral ankle edema, no clubbing or cyanosis to extremities  Psychiatric: Flat affect, cooperative  Neurologic: Oriented x 3, strength symmetric and full 5/5 in all extremities on exam, Cranial Nerves grossly intact to confrontation, speech quiet but clear.  He states he has some decreased sensation on the left side of his face and some numbness/tingling on the right side.  Skin: No rashes, normal turgor.    Result Review:  I have personally reviewed the results from the time of this admission to 10/7/2023 00:45 EDT and agree with these findings:  [x]  Laboratory list / accordion  []  Microbiology  [x]  Radiology  [x]  EKG/Telemetry   []  Cardiology/Vascular   []  Pathology  []  Old records  []  Other:  Most notable findings include: I reviewed EKG which by my read shows normal sinus rhythm, ventricular rate approximately 80 bpm, nonspecific ST/T wave changes but no acute appearing ST elevation.  I reviewed chest x-ray which is a single AP view and by my read shows no acute infiltrate/edema.        LAB RESULTS:      Lab 10/06/23  2258 10/06/23  2257 10/06/23  2251 10/06/23  2155   WBC  --   --   --  9.09   HEMOGLOBIN  --   --   --  13.9   HEMOGLOBIN, POC  --  12.9  --   --    HEMATOCRIT  --   --   --  43.0   PLATELETS  --   --   --  293   NEUTROS ABS  --   --   --  5.78   IMMATURE GRANS (ABS)  --   --   --  0.15*   LYMPHS ABS  --   --   --  2.07   MONOS ABS  --   --   --  0.71   EOS ABS  --   --   --  0.28    MCV  --   --   --  94.3   LACTATE  --   --   --  1.5   PROTIME 14.5  --  14.5  --    INR  --   --  1.2  --    APTT  --   --   --  30.8   D DIMER QUANT  --   --   --  <0.27         Lab 10/06/23  2257 10/06/23  2155   SODIUM  --  140   POTASSIUM  --  4.3   CHLORIDE  --  102   CO2  --  29.0   ANION GAP  --  9.0   BUN  --  15   CREATININE 1.00 0.92   EGFR  --  109.2   GLUCOSE  --  100*   CALCIUM  --  9.4   MAGNESIUM  --  2.3   TSH  --  2.310         Lab 10/06/23  2155   TOTAL PROTEIN 7.3   ALBUMIN 4.7   GLOBULIN 2.6   ALT (SGPT) 30   AST (SGOT) 17   BILIRUBIN 0.3   ALK PHOS 89         Lab 10/06/23  2155   PROBNP <36.0   HSTROP T 7                 UA          9/13/2023    00:31 9/18/2023    22:36 10/6/2023    22:03   Urinalysis   Squamous Epithelial Cells, UA 0-2     0-2     0-2    Specific Gravity, UA >=1.030     1.025     1.029    Ketones, UA   Negative    Blood, UA Large     Moderate     Negative    Leukocytes, UA Moderate     Moderate     Negative    Nitrite, UA   Negative    RBC, UA >50     >50     0-2    WBC, UA   3-5    Bacteria, UA Present     Present     None Seen       Details          This result is from an external source.               Microbiology Results (last 10 days)       Procedure Component Value - Date/Time    COVID PRE-OP / PRE-PROCEDURE SCREENING ORDER (NO ISOLATION) - Swab, Nasopharynx [[id number]]  (Abnormal) Collected: 10/06/23 2206    Lab Status: Final result Specimen: Swab from Nasopharynx Updated: 10/06/23 8383    Narrative:      The following orders were created for panel order COVID PRE-OP / PRE-PROCEDURE SCREENING ORDER (NO ISOLATION) - Swab, Nasopharynx.  Procedure                               Abnormality         Status                     ---------                               -----------         ------                     Respiratory Panel PCR w/...[[id number]]  Abnormal            Final result                 Please view results for these tests on the individual orders.    Respiratory  Panel PCR w/COVID-19(SARS-CoV-2) ELBA/KRISTIN/MICHELLE/[facility]/[place] In-House, NP Swab in UTM/VTM, 3-4 HR TAT - Swab, Nasopharynx [[id number]]  (Abnormal) Collected: 10/06/23 2206    Lab Status: Final result Specimen: Swab from Nasopharynx Updated: 10/06/23 2329     ADENOVIRUS, PCR Not Detected     Coronavirus 229E Not Detected     Coronavirus HKU1 Not Detected     Coronavirus NL63 Not Detected     Coronavirus OC43 Not Detected     COVID19 Not Detected     Human Metapneumovirus Not Detected     Human Rhinovirus/Enterovirus Detected     Influenza A PCR Not Detected     Influenza B PCR Not Detected     Parainfluenza Virus 1 Not Detected     Parainfluenza Virus 2 Not Detected     Parainfluenza Virus 3 Not Detected     Parainfluenza Virus 4 Not Detected     RSV, PCR Not Detected     Bordetella pertussis pcr Not Detected     Bordetella parapertussis PCR Not Detected     Chlamydophila pneumoniae PCR Not Detected     Mycoplasma pneumo by PCR Not Detected    Narrative:      In the setting of a positive respiratory panel with a viral infection PLUS a negative procalcitonin without other underlying concern for bacterial infection, consider observing off antibiotics or discontinuation of antibiotics and continue supportive care. If the respiratory panel is positive for atypical bacterial infection (Bordetella pertussis, Chlamydophila pneumoniae, or Mycoplasma pneumoniae), consider antibiotic de-escalation to target atypical bacterial infection.            CT Angiogram Neck    Result Date: 10/7/2023  CT ANGIOGRAM HEAD W AI ANALYSIS OF LVO CT ANGIOGRAM NECK Date of Exam: 10/6/2023 11:36 PM EDT Indication: Neuro deficit, acute stroke suspected Neuro deficit, acute stroke suspected. Comparison: None available. Technique: CTA of the head and neck was performed after the uneventful intravenous administration of 150 mL of Isovue-370. Reconstructed coronal and sagittal images were also obtained. In addition, a 3-D volume rendered image was  created for interpretation. Automated exposure control and iterative reconstruction methods were used. Findings: Aortic arch: The aortic arch is unremarkable. There is 4 vessel arch anatomy with the left vertebral artery arising directly from the aortic arch. The right brachiocephalic and visualized bilateral subclavian arteries are within normal limits. Right carotid: The right CCA arises as expected from the brachiocephalic trunk.  The CCA follows a normal course and appears normal caliber.  The carotid bifurcation is unremarkable.  The external carotid artery and distal branches appear within normal limits.  The cervical internal carotid artery follows a normal course and appears normal caliber throughout the neck and into the head.  The intracranial ICA segments appear within normal limits.  The ophthalmic artery origin is normal.  The A1 and M1 segments appear within normal limits.  The visualized distal SHANNAN and MCA branches appear patent.  There is  a patent  anterior communicating artery. There is a patent  posterior communicating artery. Left carotid: The left CCA arises as expected from the aortic arch.   The CCA follows a normal course and appears normal caliber.  The carotid bifurcation is unremarkable.  The external carotid artery and distal branches appear within normal limits.  The  cervical internal carotid artery follows a normal course and appears normal caliber throughout the neck and into the head.  The intracranial ICA segments appear within normal limits.  The ophthalmic artery origin is normal.  The A1 and M1 segments appear within normal limits.  The visualized distal SHANNAN and MCA branches appear patent.  There is a patent  posterior communicating artery. Posterior circulation: Vertebral arteries arise as expected from ipsilateral subclavian arteries. There is left vertebral artery dominance. The vertebral arteries follow a normal course and appear normal caliber throughout the neck and into  the head.  The V4 segments are patent.  Visualized posterior inferior cerebellar arteries are within normal limits.  The basilar artery is normal caliber.  Superior cerebellar arteries are patent.  Bilateral P1 segments and posterior cerebral arteries appear within  normal limits. Nonvascular findings: Intracranial structures appear unremarkable.  Orbits are within normal limits. There are left maxillary sinus mucus retention cysts. Superficial soft tissues and underlying musculature appear within normal limits.  The lung apices are clear.  The thyroid and salivary glands appear unremarkable.  The suprahyoid and infrahyoid spaces of the neck appear unremarkable.  There is no evidence of cervical lymphadenopathy or a neck mass.  There are no acute osseous abnormalities or destructive bone lesions. There is a prosthetic disc at the C5-C6 level.     Impression: Impression: 1.No significant vascular abnormality in the head or the neck. No significant stenosis, aneurysm or dissection. 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 Electronically Signed: Crow Navas MD  10/7/2023 12:11 AM EDT  Workstation ID: [id number]    XR Chest 1 View    Result Date: 10/6/2023  XR CHEST 1 VW Date of Exam: 10/6/2023 10:20 PM EDT Indication: Acute Stroke Protocol (onset < 12 hrs) Comparison: None available. FINDINGS: No consolidations or pleural effusions are observed. The cardiac silhouette is within normal limits for size. The mediastinum is unremarkable. No acute osseous abnormalities are identified on this single view.     Impression: 1.No evidence for acute cardiopulmonary process. Electronically Signed: Bobby Chacon MD  10/6/2023 10:35 PM EDT  Workstation ID: [id number]    CT Head Without Contrast Stroke Protocol    Result Date: 10/7/2023  CT HEAD WO CONTRAST Date of Exam: 10/6/2023 11:36 PM EDT Indication: Neuro deficit, acute, stroke suspected Neuro deficit, acute stroke suspected. Comparison: None available. Technique: Axial CT images  were obtained of the head without contrast administration.  Reconstructed coronal images were also obtained. Automated exposure control and iterative construction methods were used. Findings: Superficial soft tissues appear within normal limits. The calvarium is intact. There are left maxillary sinus mucus retention cysts. Mastoid air cells appear well aerated. There is left-sided orbital exotropia. There is no acute intracranial hemorrhage.  No mass effect or midline shift.  No abnormal extra-axial collections.  Gray-white differentiation is within normal limits.  There are no focal hypoattenuating lesions.  Ventricular size and configuration is normal for age.     Impression: Impression: 1.No acute intracranial abnormality. 2.Left-sided orbital exotropia. Electronically Signed: Crow Navas MD  10/7/2023 12:04 AM EDT  Workstation ID: [id number]    CT Angiogram Head w AI Analysis of LVO    Result Date: 10/7/2023  CT ANGIOGRAM HEAD W AI ANALYSIS OF LVO CT ANGIOGRAM NECK Date of Exam: 10/6/2023 11:36 PM EDT Indication: Neuro deficit, acute stroke suspected Neuro deficit, acute stroke suspected. Comparison: None available. Technique: CTA of the head and neck was performed after the uneventful intravenous administration of 150 mL of Isovue-370. Reconstructed coronal and sagittal images were also obtained. In addition, a 3-D volume rendered image was created for interpretation. Automated exposure control and iterative reconstruction methods were used. Findings: Aortic arch: The aortic arch is unremarkable. There is 4 vessel arch anatomy with the left vertebral artery arising directly from the aortic arch. The right brachiocephalic and visualized bilateral subclavian arteries are within normal limits. Right carotid: The right CCA arises as expected from the brachiocephalic trunk.  The CCA follows a normal course and appears normal caliber.  The carotid bifurcation is unremarkable.  The external carotid artery and distal  branches appear within normal limits.  The cervical internal carotid artery follows a normal course and appears normal caliber throughout the neck and into the head.  The intracranial ICA segments appear within normal limits.  The ophthalmic artery origin is normal.  The A1 and M1 segments appear within normal limits.  The visualized distal SHANNAN and MCA branches appear patent.  There is  a patent  anterior communicating artery. There is a patent  posterior communicating artery. Left carotid: The left CCA arises as expected from the aortic arch.   The CCA follows a normal course and appears normal caliber.  The carotid bifurcation is unremarkable.  The external carotid artery and distal branches appear within normal limits.  The  cervical internal carotid artery follows a normal course and appears normal caliber throughout the neck and into the head.  The intracranial ICA segments appear within normal limits.  The ophthalmic artery origin is normal.  The A1 and M1 segments appear within normal limits.  The visualized distal SHANNAN and MCA branches appear patent.  There is a patent  posterior communicating artery. Posterior circulation: Vertebral arteries arise as expected from ipsilateral subclavian arteries. There is left vertebral artery dominance. The vertebral arteries follow a normal course and appear normal caliber throughout the neck and into the head.  The V4 segments are patent.  Visualized posterior inferior cerebellar arteries are within normal limits.  The basilar artery is normal caliber.  Superior cerebellar arteries are patent.  Bilateral P1 segments and posterior cerebral arteries appear within  normal limits. Nonvascular findings: Intracranial structures appear unremarkable.  Orbits are within normal limits. There are left maxillary sinus mucus retention cysts. Superficial soft tissues and underlying musculature appear within normal limits.  The lung apices are clear.  The thyroid and salivary glands  appear unremarkable.  The suprahyoid and infrahyoid spaces of the neck appear unremarkable.  There is no evidence of cervical lymphadenopathy or a neck mass.  There are no acute osseous abnormalities or destructive bone lesions. There is a prosthetic disc at the C5-C6 level.     Impression: Impression: 1.No significant vascular abnormality in the head or the neck. No significant stenosis, aneurysm or dissection. 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 Electronically Signed: Crow Navas MD  10/7/2023 12:11 AM EDT  Workstation ID: [id number]    CT CEREBRAL PERFUSION WITH & WITHOUT CONTRAST    Result Date: 10/7/2023  CT CEREBRAL PERFUSION W WO CONTRAST Date of Exam: 10/6/2023 11:36 PM EDT Indication: Neuro deficit, acute stroke suspected.  Comparison: None available. Technique: Axial CT images of the brain were obtained prior to and after the administration of 150 mL Isovue-370. Core blood volume, core blood flow, mean transit time, and Tmax images were obtained utilizing the Rapid software protocol. A limited CT angiogram of the head was also performed to measure the blood vessel density. The radiation dose reduction device was turned on for each scan per the ALARA (As Low as Reasonably Achievable) protocol. Findings: Cerebral blood flow, blood volume and mean transit time maps are symmetric without large perfusion defect. There are minor Tmax abnormalities, which appears symmetric and confined to the white matter, likely artifact. CBF<30% volume: 0 mL Tmax>6sec volume: 0 mL Mismatch volume: 0 mL Mismatch ratio: None.     Impression: Impression: No CT perfusion evidence of infarct or ischemia. Electronically Signed: Crow Navas MD  10/7/2023 12:08 AM EDT  Workstation ID: [id number]         Assessment & Plan   Assessment & Plan     Active Hospital Problems    Diagnosis  POA    **Right facial numbness [R20.0]  Yes       38M with facial numbness, also periodic numbness/tingling of other areas of his face and upper  extremities for several weeks    Facial numbness  - Daily aspirin/Plavix and statin.  - Check 2D echo.  - HbA1c, lipid panel, TSH.  - Neuro checks.  - CT head and CTA head/neck reviewed.  Radiology reports mention nothing acute.  - MRI brain read from radiology also mentions normal exam, nothing acute.  - PT/OT/SLP.  - LP performed in the ED, will await results.  - We will follow-up further neurology service recommendations.    Hypertension  - Continue lisinopril from home regimen.    Anxiety/depression  - Continue amitriptyline and doxepin from home regimen.    Degenerative disc disease with chronic back pain  - Pain control with oral agents, can add IV if needed.    Hypothyroidism  - The patient states he takes levothyroxine but did not know the dose, and it is not listed on his home reviewed medication list.  - We will check TSH as above.        Total time spent: 78 minutes  Time spent includes time reviewing chart, face-to-face time, counseling patient/family/caregiver, ordering medications/tests/procedures, communicating with other health care professionals, documenting clinical information in the electronic health record, and coordination of care.     DVT prophylaxis:  scds      CODE STATUS:  full  Code Status and Medical Interventions:   Ordered at: 10/07/23 0045     Level Of Support Discussed With:    Patient     Code Status (Patient has no pulse and is not breathing):    CPR (Attempt to Resuscitate)     Medical Interventions (Patient has pulse or is breathing):    Full Support       Expected Discharge tbcastillo Pace,III, DO  10/07/23

## 2023-10-07 NOTE — PLAN OF CARE
Problem: Pain Acute  Goal: Acceptable Pain Control and Functional Ability  Outcome: Ongoing, Progressing     Problem: Functional Ability Impaired (Stroke, Ischemic/Transient Ischemic Attack)  Goal: Optimal Functional Ability  Outcome: Ongoing, Progressing     Problem: Urinary Elimination Impaired (Stroke, Ischemic/Transient Ischemic Attack)  Goal: Effective Urinary Elimination  Outcome: Ongoing, Progressing   Goal Outcome Evaluation:

## 2023-10-08 LAB
QT INTERVAL: 384 MS
QTC INTERVAL: 451 MS

## 2023-10-10 LAB
BACTERIA SPEC AEROBE CULT: NORMAL
GRAM STN SPEC: NORMAL

## 2024-01-22 ENCOUNTER — OFFICE VISIT (OUTPATIENT)
Dept: NEUROLOGY | Facility: CLINIC | Age: 39
End: 2024-01-22
Payer: MEDICAID

## 2024-01-22 VITALS
BODY MASS INDEX: 31.15 KG/M2 | WEIGHT: 230 LBS | HEART RATE: 81 BPM | OXYGEN SATURATION: 98 % | SYSTOLIC BLOOD PRESSURE: 120 MMHG | HEIGHT: 72 IN | DIASTOLIC BLOOD PRESSURE: 82 MMHG

## 2024-01-22 DIAGNOSIS — R20.0 RIGHT FACIAL NUMBNESS: Primary | ICD-10-CM

## 2024-01-22 PROCEDURE — 1159F MED LIST DOCD IN RCRD: CPT | Performed by: PSYCHIATRY & NEUROLOGY

## 2024-01-22 PROCEDURE — 99214 OFFICE O/P EST MOD 30 MIN: CPT | Performed by: PSYCHIATRY & NEUROLOGY

## 2024-01-22 PROCEDURE — 1160F RVW MEDS BY RX/DR IN RCRD: CPT | Performed by: PSYCHIATRY & NEUROLOGY

## 2024-01-22 RX ORDER — HYDROCHLOROTHIAZIDE 25 MG/1
25 TABLET ORAL DAILY
COMMUNITY
Start: 2023-10-26 | End: 2024-10-25

## 2024-01-22 RX ORDER — DULOXETIN HYDROCHLORIDE 30 MG/1
30 CAPSULE, DELAYED RELEASE ORAL DAILY
COMMUNITY
Start: 2024-01-18 | End: 2024-01-22 | Stop reason: SDUPTHER

## 2024-01-22 RX ORDER — PROMETHAZINE HYDROCHLORIDE 25 MG/1
TABLET ORAL
COMMUNITY
Start: 2023-11-09

## 2024-01-22 RX ORDER — TROSPIUM CHLORIDE 20 MG/1
20 TABLET, FILM COATED ORAL DAILY
COMMUNITY
Start: 2024-01-03 | End: 2025-01-02

## 2024-01-22 RX ORDER — POTASSIUM CHLORIDE 1500 MG/1
TABLET, EXTENDED RELEASE ORAL
COMMUNITY
Start: 2023-11-14

## 2024-01-22 RX ORDER — DULOXETIN HYDROCHLORIDE 60 MG/1
60 CAPSULE, DELAYED RELEASE ORAL DAILY
Qty: 30 CAPSULE | Refills: 5 | Status: SHIPPED | OUTPATIENT
Start: 2024-01-22 | End: 2024-01-25 | Stop reason: SDUPTHER

## 2024-01-22 RX ORDER — MECLIZINE HYDROCHLORIDE 25 MG/1
TABLET ORAL
COMMUNITY
Start: 2023-09-04

## 2024-01-22 RX ORDER — ONDANSETRON HYDROCHLORIDE 8 MG/1
TABLET, FILM COATED ORAL
COMMUNITY
Start: 2023-11-09

## 2024-01-22 NOTE — PROGRESS NOTES
Subjective   Patient ID: Ritesh Azevedo is a 39 y.o. male     Chief Complaint   Patient presents with    Myasthenia Gravis        History of Present Illness    39 y.o. male referred by Dr Kenji Silva for weakness.     Reviewed medical records:    Reports numbness and paresthesias.      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      VEEG - 23 - 23 2 episodes of PNES    CymbalMag redelor for neuropathic pain.      Cervical myelopathy s/p surgery  C5-6 ACDF     HCT/CTA no sig stenosis     CSF protein 51.6     Admitted [facility] 10/6/23 - 10/7/23 R facial numbness.      Past Medical History:   Diagnosis Date    Anxiety     Arthritis     spine    Depression     Hypertension      no medicine    Kidney stone      Family History   Problem Relation Age of Onset    Hypertension Father      Social History     Socioeconomic History    Marital status: Single   Tobacco Use    Smoking status: Every Day     Types: Electronic Cigarette     Last attempt to quit: 2016     Years since quittin.8     Passive exposure: Current    Smokeless tobacco: Former    Tobacco comments:     quit  2016   Vaping Use    Vaping Use: Every day    Substances: Nicotine    Devices: Disposable    Passive vaping exposure: Yes   Substance and Sexual Activity    Alcohol use: No    Drug use: No    Sexual activity: Defer     Partners: Female       Review of Systems   Constitutional:  Positive for fatigue. Negative for activity change and unexpected weight change.   HENT:  Negative for facial swelling, hearing loss, tinnitus, trouble swallowing and voice change.    Eyes:  Negative for photophobia, pain and visual disturbance.   Respiratory:  Negative for apnea, cough and choking.    Cardiovascular:  Negative for chest pain.   Gastrointestinal:  Negative for constipation, nausea and vomiting.   Endocrine: Negative for cold intolerance.   Genitourinary:  Positive for frequency and urgency.  "Negative for difficulty urinating.   Musculoskeletal:  Positive for arthralgias, back pain, joint swelling, myalgias, neck pain and neck stiffness. Negative for gait problem.   Skin:  Negative for rash.   Allergic/Immunologic: Negative for immunocompromised state.   Neurological:  Positive for dizziness, tremors, speech difficulty, weakness, light-headedness, numbness and headaches. Negative for seizures, syncope and facial asymmetry.   Hematological:  Negative for adenopathy.   Psychiatric/Behavioral:  Positive for agitation, confusion, decreased concentration and dysphoric mood. Negative for hallucinations and sleep disturbance. The patient is nervous/anxious.        Objective     Vitals:    01/22/24 1403   BP: 120/82   Pulse: 81   SpO2: 98%   Weight: 104 kg (230 lb)   Height: 182.9 cm (72.01\")       Neurologic Exam     Mental Status   Oriented to person, place, and time.   Speech: speech is normal   Level of consciousness: alert  Knowledge: good and consistent with education.   Normal comprehension.     Cranial Nerves   Cranial nerves II through XII intact.     CN II   Visual fields full to confrontation.   Visual acuity: normal  Right visual field deficit: none  Left visual field deficit: none     CN III, IV, VI   Pupils are equal, round, and reactive to light.  Extraocular motions are normal.   Nystagmus: none   Diplopia: none  Ophthalmoparesis: none  Upgaze: normal  Downgaze: normal  Conjugate gaze: present    CN V   Facial sensation intact.   Right corneal reflex: normal  Left corneal reflex: normal    CN VII   Right facial weakness: none  Left facial weakness: none    CN VIII   Hearing: intact    CN IX, X   Palate: symmetric  Right gag reflex: normal  Left gag reflex: normal    CN XI   Right sternocleidomastoid strength: normal  Left sternocleidomastoid strength: normal    CN XII   Tongue: not atrophic  Fasciculations: absent  Tongue deviation: none    Motor Exam   Muscle bulk: normal  Overall muscle tone: " normal    Strength   Strength 5/5 throughout.     Sensory Exam   Light touch normal.     Gait, Coordination, and Reflexes     Gait  Gait: normal    Tremor   Resting tremor: absent  Intention tremor: absent  Action tremor: absent    Reflexes   Reflexes 2+ except as noted.       Physical Exam  Eyes:      Extraocular Movements: EOM normal.      Pupils: Pupils are equal, round, and reactive to light.   Neurological:      Mental Status: He is oriented to person, place, and time.      Cranial Nerves: Cranial nerves 2-12 are intact.      Motor: Motor strength is normal.     Gait: Gait is intact.   Psychiatric:         Speech: Speech normal.         No results displayed because visit has over 200 results.            Assessment & Plan     Problem List Items Addressed This Visit          Symptoms and Signs    Right facial numbness - Primary    Current Assessment & Plan     Increase Cymbalta 60 mg daily                No follow-ups on file.

## 2024-01-24 ENCOUNTER — PATIENT MESSAGE (OUTPATIENT)
Dept: NEUROLOGY | Facility: CLINIC | Age: 39
End: 2024-01-24
Payer: MEDICAID

## 2024-01-24 NOTE — TELEPHONE ENCOUNTER
From: Ritesh Azevedo  To: Dc Stearns  Sent: 1/24/2024 3:20 PM EST  Subject: !    My pharmacy has not yet received a prescription for the higher dose duloxetine. Just wanted to check base with you and see if you still planned on sending that in?

## 2024-01-25 RX ORDER — DULOXETIN HYDROCHLORIDE 60 MG/1
60 CAPSULE, DELAYED RELEASE ORAL DAILY
Qty: 30 CAPSULE | Refills: 5 | Status: SHIPPED | OUTPATIENT
Start: 2024-01-25 | End: 2024-07-23

## 2025-02-28 ENCOUNTER — TRANSCRIBE ORDERS (OUTPATIENT)
Dept: ADMINISTRATIVE | Facility: HOSPITAL | Age: 40
End: 2025-02-28
Payer: MEDICARE

## 2025-02-28 DIAGNOSIS — R74.8 ELEVATED LIVER ENZYMES: ICD-10-CM

## 2025-02-28 DIAGNOSIS — R10.11 ABDOMINAL PAIN, RIGHT UPPER QUADRANT: Primary | ICD-10-CM

## 2025-03-18 ENCOUNTER — TRANSCRIBE ORDERS (OUTPATIENT)
Dept: ADMINISTRATIVE | Facility: HOSPITAL | Age: 40
End: 2025-03-18
Payer: MEDICARE

## 2025-03-18 DIAGNOSIS — R10.11 RUQ PAIN: Primary | ICD-10-CM

## 2025-03-18 DIAGNOSIS — R74.8 ELEVATED LIVER ENZYMES: ICD-10-CM

## 2025-03-19 ENCOUNTER — HOSPITAL ENCOUNTER (OUTPATIENT)
Dept: MRI IMAGING | Facility: HOSPITAL | Age: 40
Discharge: HOME | End: 2025-03-19
Admitting: PEDIATRICS
Payer: MEDICARE

## 2025-03-19 DIAGNOSIS — R74.8 ELEVATED LIVER ENZYMES: ICD-10-CM

## 2025-03-19 DIAGNOSIS — R10.11 ABDOMINAL PAIN, RIGHT UPPER QUADRANT: ICD-10-CM

## 2025-03-19 PROCEDURE — 74183 MRI ABD W/O CNTR FLWD CNTR: CPT

## 2025-03-19 PROCEDURE — A9577 INJ MULTIHANCE: HCPCS | Performed by: PEDIATRICS

## 2025-03-19 PROCEDURE — 25510000002 GADOBENATE DIMEGLUMINE 529 MG/ML SOLUTION: Performed by: PEDIATRICS

## 2025-03-19 RX ADMIN — GADOBENATE DIMEGLUMINE 20 ML: 529 INJECTION, SOLUTION INTRAVENOUS at 20:41

## 2025-05-01 ENCOUNTER — TRANSCRIBE ORDERS (OUTPATIENT)
Dept: ADMINISTRATIVE | Facility: HOSPITAL | Age: 40
End: 2025-05-01
Payer: MEDICARE

## 2025-05-01 DIAGNOSIS — Z98.890 S/P CERVICAL DISCECTOMY: Primary | ICD-10-CM

## 2025-05-01 DIAGNOSIS — M54.2 CERVICALGIA: ICD-10-CM

## 2025-05-01 DIAGNOSIS — G83.9 PARALYSIS: ICD-10-CM

## 2025-05-01 DIAGNOSIS — M47.816 LUMBAR SPONDYLOSIS: ICD-10-CM

## 2025-05-05 ENCOUNTER — TELEPHONE (OUTPATIENT)
Dept: INFUSION THERAPY | Facility: HOSPITAL | Age: 40
End: 2025-05-05
Payer: MEDICARE

## 2025-05-05 NOTE — TELEPHONE ENCOUNTER
Contacted patient as pre-procedure call prior to planned myelogram scheduled for 5/7/25. Reviewed with patient arrival time, location,  needed, nothing to eat after midnight but clear liquids okay, may take medications the morning of the procedure. Plan on being here for procedure 4-5 hours so wear comfortable clothes. Reviewed medical history, medications, allergies and allowed time for questions.

## 2025-05-12 ENCOUNTER — TELEPHONE (OUTPATIENT)
Dept: INFUSION THERAPY | Facility: HOSPITAL | Age: 40
End: 2025-05-12
Payer: MEDICARE

## 2025-06-05 ENCOUNTER — TELEPHONE (OUTPATIENT)
Dept: INFUSION THERAPY | Facility: HOSPITAL | Age: 40
End: 2025-06-05
Payer: MEDICARE

## 2025-06-05 RX ORDER — PLECANATIDE 3 MG/1
1 TABLET ORAL DAILY
COMMUNITY

## 2025-06-05 RX ORDER — LEVOTHYROXINE SODIUM 112 UG/1
112 TABLET ORAL
COMMUNITY

## 2025-06-05 RX ORDER — LIDOCAINE 50 MG/G
1 OINTMENT TOPICAL
COMMUNITY
Start: 2024-12-23

## 2025-06-05 RX ORDER — METHOCARBAMOL 500 MG/1
500 TABLET, FILM COATED ORAL EVERY 8 HOURS PRN
COMMUNITY
Start: 2025-05-15

## 2025-06-05 RX ORDER — OXYCODONE HYDROCHLORIDE 20 MG/1
20 TABLET ORAL EVERY 6 HOURS PRN
COMMUNITY
Start: 2025-03-10

## 2025-06-05 RX ORDER — HYDROXYCHLOROQUINE SULFATE 200 MG/1
200 TABLET, FILM COATED ORAL 2 TIMES DAILY
COMMUNITY
Start: 2024-12-24

## 2025-06-05 RX ORDER — AMLODIPINE BESYLATE 2.5 MG/1
2.5 TABLET ORAL DAILY
COMMUNITY
Start: 2025-03-19

## 2025-06-05 NOTE — TELEPHONE ENCOUNTER
Pt contacted as pre-procedure phone call prior to planned myelogram scheduled for 6/9/25. Reviewed with patient arrival time, location, may have a light breakfast before 8 a.m., may continue to have clear liquids up until the time of procedure, okay to take blood pressure medications morning of procedure,  needed, reviewed procedure instructions and allowed time for questions, and reviewed home medications, allergies, and medical history.

## 2025-06-09 ENCOUNTER — HOSPITAL ENCOUNTER (OUTPATIENT)
Dept: CT IMAGING | Facility: HOSPITAL | Age: 40
Discharge: HOME | End: 2025-06-09
Payer: MEDICARE

## 2025-06-09 ENCOUNTER — HOSPITAL ENCOUNTER (OUTPATIENT)
Dept: GENERAL RADIOLOGY | Facility: HOSPITAL | Age: 40
Discharge: HOME | End: 2025-06-09
Payer: MEDICARE

## 2025-06-09 VITALS
SYSTOLIC BLOOD PRESSURE: 133 MMHG | HEART RATE: 73 BPM | BODY MASS INDEX: 30.04 KG/M2 | HEIGHT: 72 IN | TEMPERATURE: 97.1 F | RESPIRATION RATE: 14 BRPM | DIASTOLIC BLOOD PRESSURE: 95 MMHG | OXYGEN SATURATION: 96 % | WEIGHT: 221.8 LBS

## 2025-06-09 DIAGNOSIS — M54.2 CERVICALGIA: ICD-10-CM

## 2025-06-09 DIAGNOSIS — M47.816 LUMBAR SPONDYLOSIS: ICD-10-CM

## 2025-06-09 DIAGNOSIS — Z98.890 S/P CERVICAL DISCECTOMY: ICD-10-CM

## 2025-06-09 DIAGNOSIS — G83.9 PARALYSIS: ICD-10-CM

## 2025-06-09 PROCEDURE — 72240 MYELOGRAPHY NECK SPINE: CPT

## 2025-06-09 PROCEDURE — 62305 MYELOGRAPHY LUMBAR INJECTION: CPT

## 2025-06-09 PROCEDURE — 72126 CT NECK SPINE W/DYE: CPT

## 2025-06-09 PROCEDURE — 25510000001 IOPAMIDOL 61 % SOLUTION: Performed by: PHYSICIAN ASSISTANT

## 2025-06-09 PROCEDURE — 25010000002 LIDOCAINE 1 % SOLUTION: Performed by: PHYSICIAN ASSISTANT

## 2025-06-09 PROCEDURE — 72129 CT CHEST SPINE W/DYE: CPT

## 2025-06-09 RX ORDER — LIDOCAINE HYDROCHLORIDE 10 MG/ML
10 INJECTION, SOLUTION INFILTRATION; PERINEURAL ONCE
Status: COMPLETED | OUTPATIENT
Start: 2025-06-09 | End: 2025-06-09

## 2025-06-09 RX ORDER — IOPAMIDOL 612 MG/ML
15 INJECTION, SOLUTION INTRATHECAL
Status: COMPLETED | OUTPATIENT
Start: 2025-06-09 | End: 2025-06-09

## 2025-06-09 RX ADMIN — LIDOCAINE HYDROCHLORIDE 10 ML: 10 INJECTION, SOLUTION INFILTRATION; PERINEURAL at 14:47

## 2025-06-09 RX ADMIN — IOPAMIDOL 15 ML: 612 INJECTION, SOLUTION INTRATHECAL at 14:47

## 2025-06-10 ENCOUNTER — TELEPHONE (OUTPATIENT)
Dept: INFUSION THERAPY | Facility: HOSPITAL | Age: 40
End: 2025-06-10
Payer: MEDICARE

## (undated) DEVICE — GLW STD STR 3CM .035X150CM

## (undated) DEVICE — CATHETER,FOLEY,COUDE,LATEX,18FR,10ML: Brand: MEDLINE

## (undated) DEVICE — NITINOL STONE RETRIEVAL BASKET: Brand: ZERO TIP

## (undated) DEVICE — CATH FOL BARDEX IC 2WY 22F 5CC

## (undated) DEVICE — HLDR CATH FOL STATLOCK SWVL TRICOT

## (undated) DEVICE — ENCORE® LATEX MICRO SIZE 8, STERILE LATEX POWDER-FREE SURGICAL GLOVE: Brand: ENCORE

## (undated) DEVICE — SYS IRR PUMP SGL ACTN VAC SYR 10CC

## (undated) DEVICE — COR CYSTO: Brand: MEDLINE INDUSTRIES, INC.

## (undated) DEVICE — GW SUP AMPLATZ ULTR/STFF/STR PTFE SS 0.35IN 145CM

## (undated) DEVICE — ADAPT UROLOK

## (undated) DEVICE — DRAINBAG,ANTI-REFLUX TOWER,L/F,2000ML,LL: Brand: MEDLINE

## (undated) DEVICE — FIBR LASR HOLMIUM SLIMLINE EZ 365U 1P/U